# Patient Record
Sex: FEMALE | Race: OTHER | NOT HISPANIC OR LATINO | Employment: OTHER | URBAN - METROPOLITAN AREA
[De-identification: names, ages, dates, MRNs, and addresses within clinical notes are randomized per-mention and may not be internally consistent; named-entity substitution may affect disease eponyms.]

---

## 2022-01-28 ENCOUNTER — TRANSCRIBE ORDERS (OUTPATIENT)
Dept: CARDIOLOGY | Facility: CLINIC | Age: 72
End: 2022-01-28

## 2022-01-28 ENCOUNTER — OFFICE VISIT (OUTPATIENT)
Dept: CARDIOLOGY | Facility: CLINIC | Age: 72
End: 2022-01-28

## 2022-01-28 VITALS
BODY MASS INDEX: 26.59 KG/M2 | HEART RATE: 91 BPM | SYSTOLIC BLOOD PRESSURE: 132 MMHG | WEIGHT: 144.5 LBS | DIASTOLIC BLOOD PRESSURE: 72 MMHG | HEIGHT: 62 IN

## 2022-01-28 DIAGNOSIS — Z13.6 SCREENING FOR ISCHEMIC HEART DISEASE: ICD-10-CM

## 2022-01-28 DIAGNOSIS — I05.0 MITRAL VALVE STENOSIS, UNSPECIFIED ETIOLOGY: ICD-10-CM

## 2022-01-28 DIAGNOSIS — Z01.818 OTHER SPECIFIED PRE-OPERATIVE EXAMINATION: Primary | ICD-10-CM

## 2022-01-28 DIAGNOSIS — I63.9 CEREBROVASCULAR ACCIDENT (CVA), UNSPECIFIED MECHANISM: Primary | ICD-10-CM

## 2022-01-28 DIAGNOSIS — R07.2 PRECORDIAL PAIN: ICD-10-CM

## 2022-01-28 DIAGNOSIS — Z01.810 PRE-OPERATIVE CARDIOVASCULAR EXAMINATION: ICD-10-CM

## 2022-01-28 PROCEDURE — 93000 ELECTROCARDIOGRAM COMPLETE: CPT | Performed by: INTERNAL MEDICINE

## 2022-01-28 PROCEDURE — 99205 OFFICE O/P NEW HI 60 MIN: CPT | Performed by: INTERNAL MEDICINE

## 2022-01-28 RX ORDER — NITROGLYCERIN 0.4 MG/1
TABLET SUBLINGUAL
Qty: 20 TABLET | Refills: 1 | Status: SHIPPED | OUTPATIENT
Start: 2022-01-28

## 2022-01-28 RX ORDER — GLIMEPIRIDE 2 MG/1
2 TABLET ORAL
COMMUNITY
End: 2022-02-01

## 2022-01-28 RX ORDER — TEMAZEPAM 15 MG/1
15 CAPSULE ORAL NIGHTLY PRN
Status: ON HOLD | COMMUNITY
End: 2022-02-07

## 2022-01-28 RX ORDER — HYDROXYCHLOROQUINE SULFATE 200 MG/1
TABLET, FILM COATED ORAL DAILY
COMMUNITY
End: 2022-02-01

## 2022-01-28 RX ORDER — IBUPROFEN 600 MG/1
600 TABLET ORAL EVERY 6 HOURS PRN
COMMUNITY

## 2022-01-28 RX ORDER — MONTELUKAST SODIUM 10 MG/1
10 TABLET ORAL NIGHTLY
COMMUNITY
End: 2022-02-01

## 2022-01-28 RX ORDER — PROGESTERONE 200 MG/1
200 CAPSULE ORAL DAILY
COMMUNITY
End: 2022-02-01

## 2022-01-28 RX ORDER — TRIAMTERENE AND HYDROCHLOROTHIAZIDE 75; 50 MG/1; MG/1
1 TABLET ORAL DAILY
COMMUNITY
End: 2022-07-05 | Stop reason: SDUPTHER

## 2022-01-29 ENCOUNTER — LAB (OUTPATIENT)
Dept: LAB | Facility: HOSPITAL | Age: 72
End: 2022-01-29

## 2022-01-29 ENCOUNTER — APPOINTMENT (OUTPATIENT)
Dept: LAB | Facility: HOSPITAL | Age: 72
End: 2022-01-29

## 2022-01-29 DIAGNOSIS — Z13.6 SCREENING FOR ISCHEMIC HEART DISEASE: ICD-10-CM

## 2022-01-29 DIAGNOSIS — Z01.818 OTHER SPECIFIED PRE-OPERATIVE EXAMINATION: ICD-10-CM

## 2022-01-29 DIAGNOSIS — Z01.810 PRE-OPERATIVE CARDIOVASCULAR EXAMINATION: ICD-10-CM

## 2022-01-29 LAB
ANION GAP SERPL CALCULATED.3IONS-SCNC: 11.9 MMOL/L (ref 5–15)
BASOPHILS # BLD AUTO: 0.03 10*3/MM3 (ref 0–0.2)
BASOPHILS NFR BLD AUTO: 0.3 % (ref 0–1.5)
BUN SERPL-MCNC: 30 MG/DL (ref 8–23)
BUN/CREAT SERPL: 24.6 (ref 7–25)
CALCIUM SPEC-SCNC: 9.1 MG/DL (ref 8.6–10.5)
CHLORIDE SERPL-SCNC: 95 MMOL/L (ref 98–107)
CO2 SERPL-SCNC: 31.1 MMOL/L (ref 22–29)
CREAT SERPL-MCNC: 1.22 MG/DL (ref 0.57–1)
DEPRECATED RDW RBC AUTO: 42.3 FL (ref 37–54)
EOSINOPHIL # BLD AUTO: 0.32 10*3/MM3 (ref 0–0.4)
EOSINOPHIL NFR BLD AUTO: 3 % (ref 0.3–6.2)
ERYTHROCYTE [DISTWIDTH] IN BLOOD BY AUTOMATED COUNT: 13.3 % (ref 12.3–15.4)
GFR SERPL CREATININE-BSD FRML MDRD: 43 ML/MIN/1.73
GFR SERPL CREATININE-BSD FRML MDRD: 52 ML/MIN/1.73
GLUCOSE SERPL-MCNC: 110 MG/DL (ref 65–99)
HCT VFR BLD AUTO: 44.6 % (ref 34–46.6)
HGB BLD-MCNC: 14.4 G/DL (ref 12–15.9)
IMM GRANULOCYTES # BLD AUTO: 0.02 10*3/MM3 (ref 0–0.05)
IMM GRANULOCYTES NFR BLD AUTO: 0.2 % (ref 0–0.5)
LYMPHOCYTES # BLD AUTO: 4.51 10*3/MM3 (ref 0.7–3.1)
LYMPHOCYTES NFR BLD AUTO: 42.9 % (ref 19.6–45.3)
MCH RBC QN AUTO: 27.8 PG (ref 26.6–33)
MCHC RBC AUTO-ENTMCNC: 32.3 G/DL (ref 31.5–35.7)
MCV RBC AUTO: 86.1 FL (ref 79–97)
MONOCYTES # BLD AUTO: 0.6 10*3/MM3 (ref 0.1–0.9)
MONOCYTES NFR BLD AUTO: 5.7 % (ref 5–12)
NEUTROPHILS NFR BLD AUTO: 47.9 % (ref 42.7–76)
NEUTROPHILS NFR BLD AUTO: 5.03 10*3/MM3 (ref 1.7–7)
NRBC BLD AUTO-RTO: 0 /100 WBC (ref 0–0.2)
PLATELET # BLD AUTO: 404 10*3/MM3 (ref 140–450)
PMV BLD AUTO: 9.3 FL (ref 6–12)
POTASSIUM SERPL-SCNC: 3.9 MMOL/L (ref 3.5–5.2)
RBC # BLD AUTO: 5.18 10*6/MM3 (ref 3.77–5.28)
SARS-COV-2 ORF1AB RESP QL NAA+PROBE: NOT DETECTED
SODIUM SERPL-SCNC: 138 MMOL/L (ref 136–145)
WBC NRBC COR # BLD: 10.51 10*3/MM3 (ref 3.4–10.8)

## 2022-01-29 PROCEDURE — 85025 COMPLETE CBC W/AUTO DIFF WBC: CPT

## 2022-01-29 PROCEDURE — C9803 HOPD COVID-19 SPEC COLLECT: HCPCS

## 2022-01-29 PROCEDURE — U0005 INFEC AGEN DETEC AMPLI PROBE: HCPCS

## 2022-01-29 PROCEDURE — U0004 COV-19 TEST NON-CDC HGH THRU: HCPCS

## 2022-01-29 PROCEDURE — 80048 BASIC METABOLIC PNL TOTAL CA: CPT

## 2022-01-29 PROCEDURE — 36415 COLL VENOUS BLD VENIPUNCTURE: CPT

## 2022-01-31 ENCOUNTER — TELEPHONE (OUTPATIENT)
Dept: CARDIOLOGY | Facility: CLINIC | Age: 72
End: 2022-01-31

## 2022-01-31 ENCOUNTER — HOSPITAL ENCOUNTER (OUTPATIENT)
Dept: CARDIOLOGY | Facility: HOSPITAL | Age: 72
Discharge: HOME | End: 2022-01-31
Admitting: INTERNAL MEDICINE

## 2022-01-31 VITALS
RESPIRATION RATE: 14 BRPM | HEART RATE: 87 BPM | OXYGEN SATURATION: 95 % | DIASTOLIC BLOOD PRESSURE: 61 MMHG | SYSTOLIC BLOOD PRESSURE: 145 MMHG

## 2022-01-31 DIAGNOSIS — I05.0 MITRAL VALVE STENOSIS, UNSPECIFIED ETIOLOGY: ICD-10-CM

## 2022-01-31 DIAGNOSIS — I63.9 CEREBROVASCULAR ACCIDENT (CVA), UNSPECIFIED MECHANISM: ICD-10-CM

## 2022-01-31 LAB
BH CV ECHO MEAS - BSA(HAYCOCK): 1.7 M^2
BH CV ECHO MEAS - BSA: 1.7 M^2
BH CV ECHO MEAS - BZI_BMI: 26.3 KILOGRAMS/M^2
BH CV ECHO MEAS - BZI_METRIC_HEIGHT: 157.5 CM
BH CV ECHO MEAS - BZI_METRIC_WEIGHT: 65.3 KG
BH CV ECHO MEAS - MED PEAK E' VEL: 8 CM/SEC
BH CV ECHO MEAS - MV A MAX VEL: 134.5 CM/SEC
BH CV ECHO MEAS - MV DEC SLOPE: 451.1 CM/SEC^2
BH CV ECHO MEAS - MV E MAX VEL: 137 CM/SEC
BH CV ECHO MEAS - MV E/A: 1
BH CV ECHO MEAS - MV MAX PG: 8.2 MMHG
BH CV ECHO MEAS - MV MEAN PG: 5 MMHG
BH CV ECHO MEAS - MV P1/2T MAX VEL: 144.1 CM/SEC
BH CV ECHO MEAS - MV P1/2T: 93.6 MSEC
BH CV ECHO MEAS - MV V2 MAX: 143.1 CM/SEC
BH CV ECHO MEAS - MV V2 MEAN: 109.2 CM/SEC
BH CV ECHO MEAS - MV V2 VTI: 37.2 CM
BH CV ECHO MEAS - MVA P1/2T LCG: 1.5 CM^2
BH CV ECHO MEAS - MVA(P1/2T): 2.4 CM^2
MAXIMAL PREDICTED HEART RATE: 147 BPM
STRESS TARGET HR: 125 BPM

## 2022-01-31 PROCEDURE — 63710000001 LIDOCAINE VISCOUS HCL 2 % SOLUTION: Performed by: INTERNAL MEDICINE

## 2022-01-31 PROCEDURE — A9270 NON-COVERED ITEM OR SERVICE: HCPCS | Performed by: INTERNAL MEDICINE

## 2022-01-31 PROCEDURE — 25010000002 MIDAZOLAM PER 1 MG: Performed by: INTERNAL MEDICINE

## 2022-01-31 PROCEDURE — 76376 3D RENDER W/INTRP POSTPROCES: CPT

## 2022-01-31 PROCEDURE — 99153 MOD SED SAME PHYS/QHP EA: CPT

## 2022-01-31 PROCEDURE — 25010000002 FENTANYL CITRATE (PF) 50 MCG/ML SOLUTION: Performed by: INTERNAL MEDICINE

## 2022-01-31 PROCEDURE — 99152 MOD SED SAME PHYS/QHP 5/>YRS: CPT

## 2022-01-31 PROCEDURE — 93319 3D ECHO IMG CGEN CAR ANOMAL: CPT | Performed by: INTERNAL MEDICINE

## 2022-01-31 PROCEDURE — 93319 3D ECHO IMG CGEN CAR ANOMAL: CPT

## 2022-01-31 PROCEDURE — 93321 DOPPLER ECHO F-UP/LMTD STD: CPT | Performed by: INTERNAL MEDICINE

## 2022-01-31 PROCEDURE — 93321 DOPPLER ECHO F-UP/LMTD STD: CPT

## 2022-01-31 PROCEDURE — 93325 DOPPLER ECHO COLOR FLOW MAPG: CPT

## 2022-01-31 PROCEDURE — 93312 ECHO TRANSESOPHAGEAL: CPT | Performed by: INTERNAL MEDICINE

## 2022-01-31 PROCEDURE — 93312 ECHO TRANSESOPHAGEAL: CPT

## 2022-01-31 RX ORDER — SODIUM CHLORIDE 9 MG/ML
INJECTION, SOLUTION INTRAVENOUS
Status: COMPLETED | OUTPATIENT
Start: 2022-01-31 | End: 2022-01-31

## 2022-01-31 RX ORDER — LIDOCAINE HYDROCHLORIDE 20 MG/ML
SOLUTION OROPHARYNGEAL
Status: COMPLETED | OUTPATIENT
Start: 2022-01-31 | End: 2022-01-31

## 2022-01-31 RX ORDER — FENTANYL CITRATE 50 UG/ML
INJECTION, SOLUTION INTRAMUSCULAR; INTRAVENOUS
Status: COMPLETED | OUTPATIENT
Start: 2022-01-31 | End: 2022-01-31

## 2022-01-31 RX ORDER — MIDAZOLAM HYDROCHLORIDE 1 MG/ML
INJECTION INTRAMUSCULAR; INTRAVENOUS
Status: COMPLETED | OUTPATIENT
Start: 2022-01-31 | End: 2022-01-31

## 2022-01-31 RX ADMIN — MIDAZOLAM HYDROCHLORIDE 2 MG: 1 INJECTION, SOLUTION INTRAMUSCULAR; INTRAVENOUS at 11:25

## 2022-01-31 RX ADMIN — MIDAZOLAM HYDROCHLORIDE 2 MG: 1 INJECTION, SOLUTION INTRAMUSCULAR; INTRAVENOUS at 11:26

## 2022-01-31 RX ADMIN — FENTANYL CITRATE 25 MCG: 50 INJECTION, SOLUTION INTRAMUSCULAR; INTRAVENOUS at 11:32

## 2022-01-31 RX ADMIN — LIDOCAINE HYDROCHLORIDE 10 ML: 20 SOLUTION ORAL; TOPICAL at 11:21

## 2022-01-31 RX ADMIN — FENTANYL CITRATE 25 MCG: 50 INJECTION, SOLUTION INTRAMUSCULAR; INTRAVENOUS at 11:28

## 2022-01-31 RX ADMIN — FENTANYL CITRATE 25 MCG: 50 INJECTION, SOLUTION INTRAMUSCULAR; INTRAVENOUS at 11:26

## 2022-01-31 RX ADMIN — SODIUM CHLORIDE 50 ML/HR: 9 INJECTION, SOLUTION INTRAVENOUS at 11:21

## 2022-01-31 RX ADMIN — MIDAZOLAM HYDROCHLORIDE 2 MG: 1 INJECTION, SOLUTION INTRAMUSCULAR; INTRAVENOUS at 11:30

## 2022-01-31 NOTE — TELEPHONE ENCOUNTER
1/31/21    Received a Email for clearance.   Pt was seen as a new pt on Friday 1/28/22 with Dr. Hancock  She is scheduled for a LALITA and further testing before being cleared.  Rodolfo BUTLER

## 2022-01-31 NOTE — PROGRESS NOTES
"Date of Office Visit: 2022  Encounter Provider: Gumaro Hancock MD  Place of Service: [facility]  Patient Name: Lula Burnett  :1950    Chief complaint: Recurrent strokes, chest pain, shortness of breath, palpitations, mitral stenosis.    History of Present Illness:    I had the pleasure of seeing the patient in cardiology office on 2022.  She is a very pleasant 71 year-old female with multiple medical issues who presents for evaluation.  She does have multiple noncardiac issues, including longstanding diabetes, recurrent strokes, and hypertension.    The patient has had approximately 3 strokes in the last 15 years per her account.  She suffered a stroke in 2021, at which time she developed significant left-sided hemiparesis.  She was evaluated at [facility] in [place], [place] at that time.  An MRI of her brain on 10/16/2021 showed a large acute CVA bridging the right occipital lobe in the right parietal lobe, and to lesser extent the right temporal lobe.  She also had an old left thalamic lacunar infarct.  A CT angiogram of the head and neck was unremarkable.  There was no significant carotid artery stenosis.  She had an echocardiogram which showed an ejection fraction of 65 to 70%, moderate LVH, grade 1 diastolic dysfunction, negative bubble study, moderate to severe mitral annular calcification, and moderate mitral stenosis with a mean gradient of 4 mmHg.  Cardiology did evaluate her and recommended outpatient follow-up and potential loop recorder placement.  However, this did not occur for unclear reasons.  She was discharged on aspirin and Plavix, although the aspirin later was discontinued because of excessive bruising.    She has a number of complaints today.  She states that she has had pain in the center of her chest which is a \"hard pain\" and occurs mostly with exertion, although has been occurring more frequently " "with rest.  This has been occurring for the past 3 to 4 months.  She often has to sit down to rest to get the pain to resolve, and it can last for up to an hour, although at least several minutes.  She also has had increasing dyspnea on exertion, which tends to correlate with the chest discomfort.  She has had palpitations during which time she feels like her heart is racing.  She states that it feels like it \"speeds up and then slows down\".    Past Medical History:   Diagnosis Date   • CVA (cerebral vascular accident) (HCC)     x3   • Diabetes (HCC)    • Diabetic neuropathy (HCC)    • GERD (gastroesophageal reflux disease)    • Hypertension    • Hypothyroidism    • Mitral stenosis        Past Surgical History:   Procedure Laterality Date   • CHOLECYSTECTOMY     • HYSTERECTOMY         Current Outpatient Medications on File Prior to Visit   Medication Sig Dispense Refill   • estrogens, conjugated, (PREMARIN) 1.25 MG tablet Take 1.25 mg by mouth Daily.     • glimepiride (AMARYL) 2 MG tablet Take 2 mg by mouth Every Morning Before Breakfast.     • hydroxychloroquine (PLAQUENIL) 200 MG tablet Take  by mouth Daily.     • ibuprofen (ADVIL,MOTRIN) 600 MG tablet Take 600 mg by mouth Every 6 (Six) Hours As Needed for Mild Pain .     • montelukast (Singulair) 10 MG tablet Take 10 mg by mouth Every Night.     • Progesterone (PROMETRIUM) 200 MG capsule Take 200 mg by mouth Daily.     • PROGESTERONE MICRONIZED TD Place  on the skin as directed by provider Every Night.     • temazepam (RESTORIL) 15 MG capsule Take 15 mg by mouth At Night As Needed for Sleep.     • triamterene-hydrochlorothiazide (MAXZIDE) 75-50 MG per tablet Take 1 tablet by mouth Daily.       No current facility-administered medications on file prior to visit.     Allergies as of 01/28/2022 - Reviewed 01/28/2022   Allergen Reaction Noted   • Codeine GI Intolerance 01/28/2022     Social History     Socioeconomic History   • Marital status:    Tobacco Use " "  • Smoking status: Former Smoker   • Smokeless tobacco: Never Used   • Tobacco comment: Quit around 1990   Substance and Sexual Activity   • Alcohol use: Never   • Drug use: Never     Family History   Problem Relation Age of Onset   • Heart failure Mother    • Lung cancer Father    • Stroke Sister        Review of Systems   Constitutional: Positive for malaise/fatigue.   Cardiovascular: Positive for chest pain, dyspnea on exertion and palpitations.   Respiratory: Positive for shortness of breath.    Gastrointestinal: Positive for nausea and vomiting.   Psychiatric/Behavioral: Positive for depression.   All other systems reviewed and are negative.     Objective:     Vitals:    01/28/22 1310   BP: 132/72   Pulse: 91   Weight: 65.5 kg (144 lb 8 oz)   Height: 157.5 cm (62\")     Body mass index is 26.43 kg/m².    Constitutional:       Appearance: Healthy appearance. Well-developed.   Eyes:      Conjunctiva/sclera: Conjunctivae normal.   HENT:      Head: Normocephalic and atraumatic.   Pulmonary:      Effort: Pulmonary effort is normal.      Breath sounds: Normal breath sounds.   Cardiovascular:      Normal rate. Regular rhythm.      Murmurs: There is no murmur.      No gallop.   Edema:     Peripheral edema absent.   Abdominal:      Palpations: Abdomen is soft.      Tenderness: There is no abdominal tenderness.   Musculoskeletal:      Cervical back: Neck supple. Skin:     General: Skin is warm.   Neurological:      Mental Status: Alert and oriented to person, place, and time.   Psychiatric:         Behavior: Behavior normal.       Lab Review:     ECG 12 Lead    Date/Time: 1/28/2022 1:14 PM  Performed by: Gumaro Hancock MD  Authorized by: Gumaro Hancock MD   Comparison: compared with previous ECG from 10/15/2021  Similar to previous ECG  Rhythm: sinus rhythm  Rate: normal  BPM: 91  Conduction: left anterior fascicular block  Other findings: poor R wave progression    Clinical impression: abnormal " EKG            Cardiac Procedures:  1.  Echocardiogram on 10/16/2021 at [facility] in [place], [place]: Ejection fraction was 65 to 70%.  There was moderate LVH.  There was grade 1 diastolic dysfunction.  The bubble study was negative.  There was moderate to severe mitral annular calcification.  There was moderate mitral stenosis with a mean gradient of 4 mmHg.    Assessment:       Diagnosis Plan   1. Cerebrovascular accident (CVA), unspecified mechanism (HCC)  Adult Transesophageal Echo (LALITA) W/ Cont if Necessary Per Protocol   2. Precordial pain     3. Mitral valve stenosis, unspecified etiology  Adult Transesophageal Echo (LALITA) W/ Cont if Necessary Per Protocol     Plan:       I had a long and extensive discussion with the patient and her son.  There are multiple issues which need to be addressed.    First, with regards to her recurrent strokes, the most recent event was in October 2021 in [place], as detailed above.  This was a fairly extensive right-sided stroke involving the right occipital lobe, right parietal lobe, and to lesser extent the right temporal lobe.  She also had an old left thalamic lacunar infarction on the MRI of the brain, and she tells me that she has had 3 strokes in the last 15 years.  The bubble study was negative, although there was some calcific mitral stenosis which was noted to be moderate.  She has never had a transesophageal echocardiogram.  I have recommended proceeding with a LALITA as soon as possible.  She is currently only on Plavix for antiplatelet therapy.  She is not on systemic anticoagulation.  She had to stop aspirin because of excessive bruising.  However, I told her to resume the aspirin at 81 mg/day for now until the LALITA can be performed.    She also is likely experiencing accelerated anginal type symptoms.  She needs a left heart catheterization as soon as possible as well.  I am going to perform the LALITA first in case this will affect systemic anticoagulation.   She also very likely needs placement of a loop recorder with the recurrent strokes, which could be performed at the same time as the heart catheterization.    I did recommend that she stop her Premarin, although she evidently has a very difficult time with hot flashes.  I did prescribe sublingual nitroglycerin for her.  She is already taking carvedilol and Lipitor.  I told her to go to the ER for any severe pain.  The patient and her son were in agreement with this plan.  She does the left retina surgery, although I told her this is going to have to be postponed indefinitely until her other issues are worked up.    I spent 70 minutes in the care of the patient, including extensively reviewing her outside records.  A significant proportion of time was spent in the room with the patient and her son regarding her recurrent strokes, need for a transesophageal echocardiogram, angina treatment and testing, and long-term monitoring.

## 2022-02-01 ENCOUNTER — OFFICE VISIT (OUTPATIENT)
Dept: FAMILY MEDICINE CLINIC | Facility: CLINIC | Age: 72
End: 2022-02-01

## 2022-02-01 VITALS
BODY MASS INDEX: 26.76 KG/M2 | TEMPERATURE: 96.8 F | HEIGHT: 62 IN | OXYGEN SATURATION: 98 % | WEIGHT: 145.4 LBS | SYSTOLIC BLOOD PRESSURE: 128 MMHG | DIASTOLIC BLOOD PRESSURE: 78 MMHG | RESPIRATION RATE: 18 BRPM | HEART RATE: 90 BPM

## 2022-02-01 DIAGNOSIS — L40.9 PSORIASIS: ICD-10-CM

## 2022-02-01 DIAGNOSIS — I34.81 MITRAL VALVE ANNULAR CALCIFICATION: ICD-10-CM

## 2022-02-01 DIAGNOSIS — R07.2 PRECORDIAL PAIN: Primary | ICD-10-CM

## 2022-02-01 DIAGNOSIS — I63.9 CEREBROVASCULAR ACCIDENT (CVA), UNSPECIFIED MECHANISM: ICD-10-CM

## 2022-02-01 DIAGNOSIS — R00.2 PALPITATIONS: ICD-10-CM

## 2022-02-01 DIAGNOSIS — E78.2 MIXED HYPERLIPIDEMIA: ICD-10-CM

## 2022-02-01 DIAGNOSIS — Z76.89 ENCOUNTER TO ESTABLISH CARE: Primary | ICD-10-CM

## 2022-02-01 DIAGNOSIS — F32.5 MAJOR DEPRESSIVE DISORDER IN FULL REMISSION, UNSPECIFIED WHETHER RECURRENT: ICD-10-CM

## 2022-02-01 DIAGNOSIS — F41.9 ANXIETY: ICD-10-CM

## 2022-02-01 DIAGNOSIS — N95.1 MENOPAUSAL SYNDROME (HOT FLASHES): ICD-10-CM

## 2022-02-01 DIAGNOSIS — E11.9 TYPE 2 DIABETES MELLITUS WITHOUT COMPLICATION, WITHOUT LONG-TERM CURRENT USE OF INSULIN: ICD-10-CM

## 2022-02-01 DIAGNOSIS — J45.40 MODERATE PERSISTENT ASTHMA WITHOUT COMPLICATION: ICD-10-CM

## 2022-02-01 DIAGNOSIS — I20.0 UNSTABLE ANGINA: ICD-10-CM

## 2022-02-01 DIAGNOSIS — I10 PRIMARY HYPERTENSION: ICD-10-CM

## 2022-02-01 PROCEDURE — 99204 OFFICE O/P NEW MOD 45 MIN: CPT | Performed by: FAMILY MEDICINE

## 2022-02-01 RX ORDER — ALPRAZOLAM 0.25 MG/1
0.12 TABLET ORAL 2 TIMES DAILY
Qty: 30 TABLET | Refills: 0 | Status: SHIPPED | OUTPATIENT
Start: 2022-02-01 | End: 2022-02-01 | Stop reason: SDUPTHER

## 2022-02-01 RX ORDER — ADALIMUMAB 40MG/0.8ML
KIT SUBCUTANEOUS
Qty: 4 EACH | Refills: 12
Start: 2022-02-01 | End: 2023-01-24 | Stop reason: SDUPTHER

## 2022-02-01 RX ORDER — ALPRAZOLAM 0.25 MG/1
0.12 TABLET ORAL 2 TIMES DAILY
Qty: 90 TABLET | Refills: 1 | Status: SHIPPED | OUTPATIENT
Start: 2022-02-01 | End: 2022-08-01 | Stop reason: SDUPTHER

## 2022-02-01 NOTE — PROGRESS NOTES
"Chief Complaint  Establish Care  History of Present Illness   Lula Burnett presents today to establish care. Patient states that she is new to the area, recently moved from [place], about a montha go to live with son and daughter in law, Sebastián and Daly Boogie.  She is requesting some medication refills.   She has already established with cardiologist, Dr. Hancock, office visit note from 1/28/2022 has been reviewed.  With history of recurrent strokes he is recommending a LALITA.     Coronary artery disease with angina, cardiologidst indicated need for left heart cath but will obtain LALITA first in case that would affect need for systemic anticoagulation.  Also intends to place a loop recorder  CVAs-the most recent event was in October 2021 in [place], as detailed above.  This was a fairly extensive right-sided stroke involving the right occipital lobe, right parietal lobe, and to lesser extent the right temporal lobe.  She also had an old left thalamic lacunar infarction on the MRI of the brain, 3 strokes in the last 15 years.  He continued her Plavix and started her back on 81 mg aspirin daily.   reports previous carotid doppler was neg.    Postmenopausal syndrome, cardiologist advised she stop Premarin the patient reports significant hot flashes and is requesting a refill today.    Anxiety, patient is on Paxil 20 mg 1/2 bid and reports taking Xanax 1/2 two times daily.  Told have \"no seritonin\" without medications became suicidal. No suicidal ideation since started medication in 1995.\    Diabetes well controlled last a1c 7.0 about 1 month ago    Current Outpatient Medications on File Prior to Visit   Medication Sig   • atorvastatin (LIPITOR) 40 MG tablet 40 mg.   • carvedilol (COREG) 6.25 MG tablet 6.25 mg 2 (Two) Times a Day With Meals.   • cetirizine (zyrTEC) 10 MG tablet    • clopidogrel (PLAVIX) 75 MG tablet    • cyclobenzaprine (FLEXERIL) 10 MG tablet    • digoxin (LANOXIN) 250 MCG tablet 250 mcg.   • " "ibuprofen (ADVIL,MOTRIN) 600 MG tablet Take 600 mg by mouth Every 6 (Six) Hours As Needed for Mild Pain .   • meclizine (ANTIVERT) 25 MG tablet 3 (Three) Times a Day As Needed.   • nitroglycerin (NITROSTAT) 0.4 MG SL tablet 1 under the tongue as needed for angina, may repeat q5mins for up three doses   • ondansetron ODT (ZOFRAN-ODT) 8 MG disintegrating tablet    • pantoprazole (PROTONIX) 40 MG EC tablet    • PARoxetine (PAXIL) 20 MG tablet Take  by mouth Daily.   • potassium chloride (K-DUR,KLOR-CON) 20 MEQ CR tablet 20 mEq Daily.   • ProAir  (90 Base) MCG/ACT inhaler    • Synjardy XR  MG tablet sustained-release 24 hour    • temazepam (RESTORIL) 15 MG capsule Take 15 mg by mouth At Night As Needed for Sleep.   • topiramate (TOPAMAX) 25 MG tablet 2 (Two) Times a Day.   • triamterene-hydrochlorothiazide (MAXZIDE) 75-50 MG per tablet Take 1 tablet by mouth Daily.   • [DISCONTINUED] ALPRAZolam (XANAX) 0.25 MG tablet 3 (Three) Times a Day.   • [DISCONTINUED] estrogens, conjugated, (PREMARIN) 1.25 MG tablet Take 1.25 mg by mouth Daily.   • [DISCONTINUED] Bydureon BCise 2 MG/0.85ML auto-injector injection    • [DISCONTINUED] fenofibrate 160 MG tablet    • [DISCONTINUED] glimepiride (AMARYL) 2 MG tablet Take 2 mg by mouth Every Morning Before Breakfast.   • [DISCONTINUED] hydroxychloroquine (PLAQUENIL) 200 MG tablet Take  by mouth Daily.   • [DISCONTINUED] lidocaine (LIDODERM) 5 %    • [DISCONTINUED] montelukast (Singulair) 10 MG tablet Take 10 mg by mouth Every Night.   • [DISCONTINUED] Progesterone (PROMETRIUM) 200 MG capsule Take 200 mg by mouth Daily.   • [DISCONTINUED] PROGESTERONE MICRONIZED TD Place  on the skin as directed by provider Every Night.     Current Facility-Administered Medications on File Prior to Visit   Medication   • [COMPLETED] sodium chloride 0.9 % infusion       Objective   Vital Signs:   /78   Pulse 90   Temp 96.8 °F (36 °C) (Temporal)   Resp 18   Ht 157.5 cm (62\")   Wt " 66 kg (145 lb 6.4 oz)   SpO2 98%   BMI 26.59 kg/m²       Physical Exam  Constitutional:       General: She is not in acute distress.     Appearance: She is well-developed.   HENT:      Head: Normocephalic and atraumatic.      Right Ear: External ear normal.      Left Ear: External ear normal.   Eyes:      Conjunctiva/sclera: Conjunctivae normal.      Pupils: Pupils are equal, round, and reactive to light.   Neck:      Thyroid: No thyromegaly.      Trachea: No tracheal deviation.   Cardiovascular:      Rate and Rhythm: Normal rate and regular rhythm.      Heart sounds: No murmur heard.  No friction rub. No gallop.    Pulmonary:      Effort: Pulmonary effort is normal.      Breath sounds: Normal breath sounds. No wheezing or rales.   Abdominal:      General: Bowel sounds are normal.      Palpations: Abdomen is soft.      Tenderness: There is no abdominal tenderness.   Musculoskeletal:      Cervical back: Normal range of motion and neck supple.   Lymphadenopathy:      Cervical: No cervical adenopathy.   Skin:     General: Skin is warm and dry.      Findings: No rash.   Neurological:      Mental Status: She is alert and oriented to person, place, and time.   Psychiatric:         Behavior: Behavior normal.            No visits with results within 1 Day(s) from this visit.   Latest known visit with results is:   Hospital Outpatient Visit on 01/31/2022   Component Date Value Ref Range Status   • BSA 01/31/2022 1.7  m^2 Final   • MV E max elyse 01/31/2022 137.0  cm/sec Final   • MV A max elyse 01/31/2022 134.5  cm/sec Final   • MV E/A 01/31/2022 1.0   Final   • MV V2 max 01/31/2022 143.1  cm/sec Final   • MV max PG 01/31/2022 8.2  mmHg Final   • MV V2 mean 01/31/2022 109.2  cm/sec Final   • MV mean PG 01/31/2022 5.0  mmHg Final   • MV V2 VTI 01/31/2022 37.2  cm Final   • MV P1/2t max elyse 01/31/2022 144.1  cm/sec Final   • MV P1/2t 01/31/2022 93.6  msec Final   • MVA(P1/2t) 01/31/2022 2.4  cm^2 Final   • MV dec slope  01/31/2022 451.1  cm/sec^2 Final   • MVA P1/2T LCG 01/31/2022 1.5  cm^2 Final   • Med Peak E' El 01/31/2022 8.0  cm/sec Final   • BH CV ECHO SATNAM - BZI_BMI 01/31/2022 26.3  kilograms/m^2 Final   • BH CV ECHO SATNAM - BSA(HAYCOCK) 01/31/2022 1.7  m^2 Final   • BH CV ECHO SATNAM - BZI_METRIC_WEIGHT 01/31/2022 65.3  kg Final   • BH CV ECHO SATNAM - BZI_METRIC_HEIGHT 01/31/2022 157.5  cm Final   • Target HR (85%) 01/31/2022 125  bpm Final   • Max. Pred. HR (100%) 01/31/2022 147  bpm Final       Lab Results   Component Value Date    BUN 30 (H) 01/29/2022    CREATININE 1.22 (H) 01/29/2022    EGFRIFNONA 43 (L) 01/29/2022    EGFRIFAFRI 52 (L) 01/29/2022     01/29/2022    K 3.9 01/29/2022    CL 95 (L) 01/29/2022    CALCIUM 9.1 01/29/2022    WBC 10.51 01/29/2022    RBC 5.18 01/29/2022    HCT 44.6 01/29/2022    MCV 86.1 01/29/2022    MCH 27.8 01/29/2022        No results found for: HGBA1C        Date/Time: 1/28/2022 1:14 PM  Performed by: Gumaro Hancock MD  Authorized by: Gumaro Hancock MD   Comparison: compared with previous ECG from 10/15/2021  Similar to previous ECG  Rhythm: sinus rhythm  Rate: normal  BPM: 91  Conduction: left anterior fascicular block  Other findings: poor R wave progression    Clinical impression: abnormal EKG        Echocardiogram on 10/16/2021 at [facility] in [place], [place]: Ejection fraction was 65 to 70%.  There was moderate LVH.  There was grade 1 diastolic dysfunction.  The bubble study was negative.  There was moderate to severe mitral annular calcification.  There was moderate mitral stenosis with a mean gradient of 4 mmHg     Assessment and Plan    Diagnoses and all orders for this visit:    1. Encounter to establish care (Primary)    2. Mitral valve annular calcification    3. Menopausal syndrome (hot flashes)  -     estrogens, conjugated, (PREMARIN) 0.9 MG tablet; Take 1 tablet by mouth Daily.  Dispense: 30 tablet; Refill: 1    4. Anxiety  -     Discontinue:  ALPRAZolam (XANAX) 0.25 MG tablet; Take 0.5 tablets by mouth 2 (Two) Times a Day.  Dispense: 30 tablet; Refill: 0  -     ALPRAZolam (XANAX) 0.25 MG tablet; Take 0.5 tablets by mouth 2 (Two) Times a Day.  Dispense: 90 tablet; Refill: 1    5. Psoriasis  Comments:  Dr Sung Viera  Orders:  -     Adalimumab (Humira Pen) 40 MG/0.8ML Pen-injector Kit; Inject  under the skin into the appropriate area as directed Every 14 (Fourteen) Days. Dr Sung viera for psoriasis  Dispense: 4 each; Refill: 12    6. Mixed hyperlipidemia  -     Lipid Panel    7. Primary hypertension  -     Comprehensive Metabolic Panel  -     CBC & Differential  -     TSH  -     T4, Free  -     MicroAlbumin, Urine, Random - Urine, Clean Catch    8. Major depressive disorder in full remission, unspecified whether recurrent (HCC)    9. Type 2 diabetes mellitus without complication, without long-term current use of insulin (HCC)  -     Hemoglobin A1c  -     TSH  -     T4, Free  -     MicroAlbumin, Urine, Random - Urine, Clean Catch    10. Moderate persistent asthma without complication    Other orders  -     fluticasone-salmeterol (Advair Diskus) 250-50 MCG/DOSE DISKUS; Inhale 1 puff 2 (Two) Times a Day.  Dispense: 180 each; Refill: 3        History of severe depression and moderate anxiety very well controlled currently on Paxil and low-dose daily Xanax.  Renewing medications.    Stressed that estrogen hormones can increase coagulability and increased risk of heart attacks and strokes.  After discussion she is willing to try to reduce Premarin from 1.25 mg to 0.9 mg daily.  Advised I would like to continue to try to reduce and wean off.  Could add in clonidine if needed for hot flashes.    Chronic medical conditions including diabetes, hyperlipidemia, hypertension, and asthma, currently stable, ordering labs as above to complete prior to next appointment  Medications Discontinued During This Encounter   Medication Reason   •  fenofibrate 160 MG tablet *Therapy completed   • lidocaine (LIDODERM) 5 % *Therapy completed   • montelukast (Singulair) 10 MG tablet *Therapy completed   • Progesterone (PROMETRIUM) 200 MG capsule *Therapy completed   • PROGESTERONE MICRONIZED TD *Therapy completed   • hydroxychloroquine (PLAQUENIL) 200 MG tablet *Therapy completed   • Bydureon BCise 2 MG/0.85ML auto-injector injection *Therapy completed   • glimepiride (AMARYL) 2 MG tablet *Therapy completed   • ALPRAZolam (XANAX) 0.25 MG tablet Reorder   • estrogens, conjugated, (PREMARIN) 1.25 MG tablet Reorder   • ALPRAZolam (XANAX) 0.25 MG tablet Reorder         Follow Up     Return in about 3 months (around 5/1/2022) for diabetes, htn, menopausal syndrome.    Patient was given instructions and counseling regarding her condition or for health maintenance advice. Please see specific information pulled into the AVS if appropriate.

## 2022-02-02 ENCOUNTER — TRANSCRIBE ORDERS (OUTPATIENT)
Dept: CARDIOLOGY | Facility: CLINIC | Age: 72
End: 2022-02-02

## 2022-02-02 ENCOUNTER — TRANSCRIBE ORDERS (OUTPATIENT)
Dept: ADMINISTRATIVE | Facility: HOSPITAL | Age: 72
End: 2022-02-02

## 2022-02-02 DIAGNOSIS — Z01.818 OTHER SPECIFIED PRE-OPERATIVE EXAMINATION: Primary | ICD-10-CM

## 2022-02-02 DIAGNOSIS — Z13.6 SCREENING FOR CARDIOVASCULAR CONDITION: Primary | ICD-10-CM

## 2022-02-02 DIAGNOSIS — Z01.810 PREPROCEDURAL CARDIOVASCULAR EXAMINATION: ICD-10-CM

## 2022-02-02 DIAGNOSIS — Z01.818 OTHER SPECIFIED PRE-OPERATIVE EXAMINATION: ICD-10-CM

## 2022-02-04 ENCOUNTER — LAB (OUTPATIENT)
Dept: LAB | Facility: HOSPITAL | Age: 72
End: 2022-02-04

## 2022-02-04 DIAGNOSIS — Z01.818 OTHER SPECIFIED PRE-OPERATIVE EXAMINATION: ICD-10-CM

## 2022-02-04 DIAGNOSIS — Z01.810 PREPROCEDURAL CARDIOVASCULAR EXAMINATION: ICD-10-CM

## 2022-02-04 DIAGNOSIS — Z13.6 SCREENING FOR CARDIOVASCULAR CONDITION: ICD-10-CM

## 2022-02-04 LAB
ALBUMIN SERPL-MCNC: 4.3 G/DL (ref 3.5–5.2)
ALBUMIN UR-MCNC: 66.9 MG/DL
ALBUMIN/GLOB SERPL: 1.3 G/DL
ALP SERPL-CCNC: 100 U/L (ref 39–117)
ALT SERPL W P-5'-P-CCNC: 20 U/L (ref 1–33)
ANION GAP SERPL CALCULATED.3IONS-SCNC: 13 MMOL/L (ref 5–15)
AST SERPL-CCNC: 20 U/L (ref 1–32)
BASOPHILS # BLD AUTO: 0.1 10*3/MM3 (ref 0–0.2)
BASOPHILS NFR BLD AUTO: 0.6 % (ref 0–1.5)
BILIRUB SERPL-MCNC: 0.3 MG/DL (ref 0–1.2)
BUN SERPL-MCNC: 23 MG/DL (ref 8–23)
BUN/CREAT SERPL: 21.9 (ref 7–25)
CALCIUM SPEC-SCNC: 10.5 MG/DL (ref 8.6–10.5)
CHLORIDE SERPL-SCNC: 94 MMOL/L (ref 98–107)
CHOLEST SERPL-MCNC: 147 MG/DL (ref 0–200)
CO2 SERPL-SCNC: 30 MMOL/L (ref 22–29)
CREAT SERPL-MCNC: 1.05 MG/DL (ref 0.57–1)
DEPRECATED RDW RBC AUTO: 45.1 FL (ref 37–54)
EOSINOPHIL # BLD AUTO: 0.1 10*3/MM3 (ref 0–0.4)
EOSINOPHIL NFR BLD AUTO: 1.2 % (ref 0.3–6.2)
ERYTHROCYTE [DISTWIDTH] IN BLOOD BY AUTOMATED COUNT: 15.1 % (ref 12.3–15.4)
GFR SERPL CREATININE-BSD FRML MDRD: 51 ML/MIN/1.73
GFR SERPL CREATININE-BSD FRML MDRD: 62 ML/MIN/1.73
GLOBULIN UR ELPH-MCNC: 3.2 GM/DL
GLUCOSE SERPL-MCNC: 112 MG/DL (ref 65–99)
HBA1C MFR BLD: 7 % (ref 3.5–5.6)
HCT VFR BLD AUTO: 47.4 % (ref 34–46.6)
HDLC SERPL-MCNC: 62 MG/DL (ref 40–60)
HGB BLD-MCNC: 15.7 G/DL (ref 12–15.9)
LDLC SERPL CALC-MCNC: 28 MG/DL (ref 0–100)
LDLC/HDLC SERPL: 0.05 {RATIO}
LYMPHOCYTES # BLD AUTO: 4.1 10*3/MM3 (ref 0.7–3.1)
LYMPHOCYTES NFR BLD AUTO: 34.8 % (ref 19.6–45.3)
MCH RBC QN AUTO: 28.5 PG (ref 26.6–33)
MCHC RBC AUTO-ENTMCNC: 33.2 G/DL (ref 31.5–35.7)
MCV RBC AUTO: 85.7 FL (ref 79–97)
MONOCYTES # BLD AUTO: 0.5 10*3/MM3 (ref 0.1–0.9)
MONOCYTES NFR BLD AUTO: 4.5 % (ref 5–12)
NEUTROPHILS NFR BLD AUTO: 58.9 % (ref 42.7–76)
NEUTROPHILS NFR BLD AUTO: 7 10*3/MM3 (ref 1.7–7)
NRBC BLD AUTO-RTO: 0 /100 WBC (ref 0–0.2)
PLATELET # BLD AUTO: 487 10*3/MM3 (ref 140–450)
PMV BLD AUTO: 7 FL (ref 6–12)
POTASSIUM SERPL-SCNC: 3.9 MMOL/L (ref 3.5–5.2)
PROT SERPL-MCNC: 7.5 G/DL (ref 6–8.5)
RBC # BLD AUTO: 5.52 10*6/MM3 (ref 3.77–5.28)
SARS-COV-2 ORF1AB RESP QL NAA+PROBE: NOT DETECTED
SODIUM SERPL-SCNC: 137 MMOL/L (ref 136–145)
T4 FREE SERPL-MCNC: 1.27 NG/DL (ref 0.93–1.7)
TRIGL SERPL-MCNC: 408 MG/DL (ref 0–150)
TSH SERPL DL<=0.05 MIU/L-ACNC: 1.87 UIU/ML (ref 0.27–4.2)
VLDLC SERPL-MCNC: 57 MG/DL (ref 5–40)
WBC NRBC COR # BLD: 11.9 10*3/MM3 (ref 3.4–10.8)

## 2022-02-04 PROCEDURE — 82043 UR ALBUMIN QUANTITATIVE: CPT | Performed by: FAMILY MEDICINE

## 2022-02-04 PROCEDURE — U0004 COV-19 TEST NON-CDC HGH THRU: HCPCS

## 2022-02-04 PROCEDURE — U0005 INFEC AGEN DETEC AMPLI PROBE: HCPCS

## 2022-02-04 PROCEDURE — 36415 COLL VENOUS BLD VENIPUNCTURE: CPT | Performed by: FAMILY MEDICINE

## 2022-02-04 PROCEDURE — 83036 HEMOGLOBIN GLYCOSYLATED A1C: CPT | Performed by: FAMILY MEDICINE

## 2022-02-04 PROCEDURE — 84439 ASSAY OF FREE THYROXINE: CPT | Performed by: FAMILY MEDICINE

## 2022-02-04 PROCEDURE — 80053 COMPREHEN METABOLIC PANEL: CPT | Performed by: FAMILY MEDICINE

## 2022-02-04 PROCEDURE — 85025 COMPLETE CBC W/AUTO DIFF WBC: CPT | Performed by: FAMILY MEDICINE

## 2022-02-04 PROCEDURE — C9803 HOPD COVID-19 SPEC COLLECT: HCPCS

## 2022-02-04 PROCEDURE — 80061 LIPID PANEL: CPT | Performed by: FAMILY MEDICINE

## 2022-02-04 PROCEDURE — 84443 ASSAY THYROID STIM HORMONE: CPT | Performed by: FAMILY MEDICINE

## 2022-02-06 DIAGNOSIS — E78.2 MIXED HYPERLIPIDEMIA: Primary | ICD-10-CM

## 2022-02-06 DIAGNOSIS — E11.9 TYPE 2 DIABETES MELLITUS WITHOUT COMPLICATION, WITHOUT LONG-TERM CURRENT USE OF INSULIN: ICD-10-CM

## 2022-02-06 DIAGNOSIS — D75.839 THROMBOCYTOSIS: ICD-10-CM

## 2022-02-07 ENCOUNTER — HOSPITAL ENCOUNTER (OUTPATIENT)
Facility: HOSPITAL | Age: 72
Setting detail: HOSPITAL OUTPATIENT SURGERY
Discharge: HOME | End: 2022-02-07
Attending: INTERNAL MEDICINE | Admitting: INTERNAL MEDICINE

## 2022-02-07 VITALS
BODY MASS INDEX: 26.59 KG/M2 | OXYGEN SATURATION: 94 % | HEIGHT: 62 IN | SYSTOLIC BLOOD PRESSURE: 142 MMHG | HEART RATE: 93 BPM | RESPIRATION RATE: 16 BRPM | TEMPERATURE: 97.1 F | DIASTOLIC BLOOD PRESSURE: 67 MMHG

## 2022-02-07 DIAGNOSIS — I20.0 UNSTABLE ANGINA: ICD-10-CM

## 2022-02-07 LAB
GLUCOSE BLDC GLUCOMTR-MCNC: 106 MG/DL (ref 70–130)
GLUCOSE BLDC GLUCOMTR-MCNC: 97 MG/DL (ref 70–130)

## 2022-02-07 PROCEDURE — 99152 MOD SED SAME PHYS/QHP 5/>YRS: CPT | Performed by: INTERNAL MEDICINE

## 2022-02-07 PROCEDURE — C1894 INTRO/SHEATH, NON-LASER: HCPCS | Performed by: INTERNAL MEDICINE

## 2022-02-07 PROCEDURE — 25010000002 MIDAZOLAM PER 1 MG: Performed by: INTERNAL MEDICINE

## 2022-02-07 PROCEDURE — 25010000002 FENTANYL CITRATE (PF) 50 MCG/ML SOLUTION: Performed by: INTERNAL MEDICINE

## 2022-02-07 PROCEDURE — 33285 INSJ SUBQ CAR RHYTHM MNTR: CPT | Performed by: INTERNAL MEDICINE

## 2022-02-07 PROCEDURE — 25010000002 HEPARIN (PORCINE) PER 1000 UNITS: Performed by: INTERNAL MEDICINE

## 2022-02-07 PROCEDURE — C1769 GUIDE WIRE: HCPCS | Performed by: INTERNAL MEDICINE

## 2022-02-07 PROCEDURE — 0 IOPAMIDOL PER 1 ML: Performed by: INTERNAL MEDICINE

## 2022-02-07 PROCEDURE — C1764 EVENT RECORDER, CARDIAC: HCPCS | Performed by: INTERNAL MEDICINE

## 2022-02-07 PROCEDURE — 93458 L HRT ARTERY/VENTRICLE ANGIO: CPT | Performed by: INTERNAL MEDICINE

## 2022-02-07 PROCEDURE — 82962 GLUCOSE BLOOD TEST: CPT

## 2022-02-07 DEVICE — ICM LP/RECRD REVEAL LINQ MEDTRONIC: Type: IMPLANTABLE DEVICE | Status: FUNCTIONAL

## 2022-02-07 RX ORDER — SODIUM CHLORIDE 0.9 % (FLUSH) 0.9 %
10 SYRINGE (ML) INJECTION AS NEEDED
Status: DISCONTINUED | OUTPATIENT
Start: 2022-02-07 | End: 2022-02-07 | Stop reason: HOSPADM

## 2022-02-07 RX ORDER — MIDAZOLAM HYDROCHLORIDE 1 MG/ML
INJECTION INTRAMUSCULAR; INTRAVENOUS AS NEEDED
Status: DISCONTINUED | OUTPATIENT
Start: 2022-02-07 | End: 2022-02-07 | Stop reason: HOSPADM

## 2022-02-07 RX ORDER — FENTANYL CITRATE 50 UG/ML
INJECTION, SOLUTION INTRAMUSCULAR; INTRAVENOUS AS NEEDED
Status: DISCONTINUED | OUTPATIENT
Start: 2022-02-07 | End: 2022-02-07 | Stop reason: HOSPADM

## 2022-02-07 RX ORDER — EXENATIDE 2 MG/.65ML
2 INJECTION, SUSPENSION, EXTENDED RELEASE SUBCUTANEOUS WEEKLY
COMMUNITY
End: 2023-01-24

## 2022-02-07 RX ORDER — LIDOCAINE HYDROCHLORIDE 20 MG/ML
INJECTION, SOLUTION INFILTRATION; PERINEURAL AS NEEDED
Status: DISCONTINUED | OUTPATIENT
Start: 2022-02-07 | End: 2022-02-07 | Stop reason: HOSPADM

## 2022-02-07 RX ORDER — SODIUM CHLORIDE 9 MG/ML
75 INJECTION, SOLUTION INTRAVENOUS CONTINUOUS
Status: DISCONTINUED | OUTPATIENT
Start: 2022-02-07 | End: 2022-02-07 | Stop reason: HOSPADM

## 2022-02-07 RX ORDER — SODIUM CHLORIDE 0.9 % (FLUSH) 0.9 %
10 SYRINGE (ML) INJECTION EVERY 12 HOURS SCHEDULED
Status: DISCONTINUED | OUTPATIENT
Start: 2022-02-07 | End: 2022-02-07 | Stop reason: HOSPADM

## 2022-02-07 RX ORDER — LEVOTHYROXINE SODIUM 0.05 MG/1
50 TABLET ORAL DAILY
COMMUNITY
End: 2022-06-20 | Stop reason: SDUPTHER

## 2022-02-07 RX ORDER — SODIUM CHLORIDE 0.9 % (FLUSH) 0.9 %
3 SYRINGE (ML) INJECTION EVERY 12 HOURS SCHEDULED
Status: DISCONTINUED | OUTPATIENT
Start: 2022-02-07 | End: 2022-02-07 | Stop reason: HOSPADM

## 2022-02-07 RX ORDER — ACETAMINOPHEN 325 MG/1
650 TABLET ORAL EVERY 4 HOURS PRN
Status: DISCONTINUED | OUTPATIENT
Start: 2022-02-07 | End: 2022-02-07 | Stop reason: HOSPADM

## 2022-02-07 NOTE — PERIOPERATIVE NURSING NOTE
Spoke with Dr Lundy to clarify when pt is to remove her Loop Recorder dressing.  He says to remove tomorrow, but to leave steristrips in place & allow to wear off on it's own.  Pt is allowed to shower tomorrow.  Pt is okay to take all of her home medications today (except the diabetes medication that contains Metformin should be held for 48 hours per protocol).

## 2022-02-07 NOTE — DISCHARGE INSTRUCTIONS
[facility]  [address] [place] [address]    Coronary Angiogram (Radial/Ulnar Approach) After Care    Refer to this sheet in the next few weeks. These instructions provide you with information on caring for yourself after your procedure. Your caregiver may also give you more specific instructions. Your treatment has been planned according to current medical practices, but problems sometimes occur. Call your caregiver if you have any problems or questions after your procedure.    Home Care Instructions:  · You may shower the day after the procedure. Remove the bandage (dressing) and gently wash the site with plain soap and water. Gently pat the site dry. You may apply a band aid daily for 2 days if desired.    · Do not apply powder or lotion to the site.  · Do not submerge the affected site in water for 3 to 5 days or until the site is completely healed.   · Do not lift, push or pull anything over 5 pounds for 5 days after your procedure or as directed by your physician.  As a reference, a gallon of milk weighs 8 pounds.   · Inspect the site at least twice daily. You may notice some bruising at the site and it may be tender for 1 to 2 weeks.     · Increase your fluid intake for the next 2 days.    · Keep arm elevated for 24 hours. For the remainder of the day, keep your arm in “Pledge of Allegiance” position when up and about.     · You may drive 24 hours after the procedure unless otherwise instructed by your caregiver.  · Do not operate machinery or power tools for 24 hours.  · A responsible adult should be with you for the first 24 hours after you arrive home. Do not make any important legal decisions or sign legal papers for 24 hours.  Do not drink alcohol for 24 hours.    · Metformin or any medications containing Metformin should not be taken for 48 hours after your procedure.      Call Your Doctor if:   · You have unusual pain at the radial/ulnar (wrist) site.  · You have redness, warmth,  swelling, or pain at the radial/ulnar (wrist) site.  · You have drainage (other than a small amount of blood on the dressing).  · `You have chills or a fever > 101.  · Your arm becomes pale or dark, cool, tingly, or numb.  · You develop chest pain, shortness of breath, feel faint or pass out.    · You have heavy bleeding from the site, hold pressure on the site for 20 minutes.  If the bleeding stops, apply a fresh bandage and call your cardiologist.  However, if you        continue to have bleeding, call 911 and continue to apply pressure to the site.   · You have any symptoms of a stroke.  Remember BE FAST  · B-balance. Sudden trouble walking or loss of balance.  · E-eyes.  Sudden changes in how you see or a sudden onset of a very bad headache.   · F-face. Sudden weakness or loss of feeling of the face or facial droop on one side.   · A-arms Sudden weakness or numbness in one arm.  One arm drifts down if they are both held out in front of you. This happens suddenly and usually on one side of the body.   · S-speech.  Sudden trouble speaking, slurred speech or trouble understanding what are saying.   · T-time  Time to call emergency services.  Write down the symptoms and the time they started.

## 2022-02-07 NOTE — PERIOPERATIVE NURSING NOTE
Nilay with [employer] called & spoke with pt on the phone to review set up for LINQ box at home. He answered pt's questions & she confirmed understanding.

## 2022-02-14 ENCOUNTER — TELEPHONE (OUTPATIENT)
Dept: FAMILY MEDICINE CLINIC | Facility: CLINIC | Age: 72
End: 2022-02-14

## 2022-02-14 NOTE — TELEPHONE ENCOUNTER
----- Message from Val Irvin MD sent at 2/6/2022 10:22 PM EST -----  Lisa Doe is new to the practice, make sure she can see the [employer] message thanks    Lisa,  A1C is at goal at 7.0  Total HDL and LDL cholesterol are great, triglycerides are a little above ideal.  Minor abnormalities in blood count of uncertain clinical significance especilly with labs a week earlier being normal.  Thyroid testing is normal    Had not really intended labs I order be done immidiatly, though you wanted to do prior to next appt in 3 months.    I am placing order for A1C, CBC, CMP and Cholesterol to do in 3 months and would like you to schedule an appt around the 1st of may to review the results

## 2022-03-02 PROCEDURE — 93298 REM INTERROG DEV EVAL SCRMS: CPT | Performed by: INTERNAL MEDICINE

## 2022-03-02 PROCEDURE — G2066 INTER DEVC REMOTE 30D: HCPCS | Performed by: INTERNAL MEDICINE

## 2022-03-23 ENCOUNTER — TELEPHONE (OUTPATIENT)
Dept: FAMILY MEDICINE CLINIC | Facility: CLINIC | Age: 72
End: 2022-03-23

## 2022-03-23 DIAGNOSIS — I10 HYPERTENSION, UNSPECIFIED TYPE: Primary | ICD-10-CM

## 2022-03-23 RX ORDER — POTASSIUM CHLORIDE 20 MEQ/1
20 TABLET, EXTENDED RELEASE ORAL DAILY
Qty: 90 TABLET | Refills: 1 | Status: SHIPPED | OUTPATIENT
Start: 2022-03-23 | End: 2022-08-11 | Stop reason: SDUPTHER

## 2022-03-23 NOTE — TELEPHONE ENCOUNTER
Caller: Lula Burnett    Relationship: Self    Best call back number: [phone]    Requested Prescriptions:   Requested Prescriptions     Pending Prescriptions Disp Refills   • potassium chloride (K-DUR,KLOR-CON) 20 MEQ CR tablet       Si tablet Daily.        Pharmacy where request should be sent: [facility] #[id number] - [place], [place] - [address] [place] [address] - [phone]  - [phone] FX     Additional details provided by patient: PATIENT IS COMPLETELY OUT OF THIS MEDICATION.     Does the patient have less than a 3 day supply:  [x] Yes  [] No    Gt Manrique Rep   22 10:49 EDT

## 2022-03-23 NOTE — TELEPHONE ENCOUNTER
Caller: Lula Burnett    Relationship: Self    Best call back number: [phone]    What is the best time to reach you: ANYTIME    Who are you requesting to speak with (clinical staff, provider,  specific staff member): DR. VALENCIA    What was the call regarding: PATIENT WANTED TO LET DR. VALENCIA KNOW THAT [facility] GOING TO BE SENDING ALL OF HER MEDICATION REFILL REQUEST.

## 2022-04-01 DIAGNOSIS — N95.1 MENOPAUSAL SYNDROME (HOT FLASHES): ICD-10-CM

## 2022-04-01 NOTE — TELEPHONE ENCOUNTER
Caller: Lula Burnett    Relationship: Self    Best call back number: [phone]    Requested Prescriptions:   Requested Prescriptions     Pending Prescriptions Disp Refills   • estrogens, conjugated, (PREMARIN) 0.9 MG tablet 30 tablet 1     Sig: Take 1 tablet by mouth Daily.        Pharmacy where request should be sent: [facility] #[id number] - [place], [place] - [address] [place] [address] - [phone]  - [phone] FX     Additional details provided by patient: PATIENT STATES SHE HAS 2 TABLETS REMAINING, REQUEST A 90 DAY REFILL WITH 3 REFILLS    Does the patient have less than a 3 day supply:  [x] Yes  [] No    Gt Faulkner Rep   04/01/22 10:34 EDT

## 2022-04-01 NOTE — TELEPHONE ENCOUNTER
Please contact patient and ask if she is willing to reduce this further to a 0.625 mg tablet?  He has reduced from 1.25 mg at last visit due to increased cardiovascular disease risk with Estrogen

## 2022-04-02 PROCEDURE — 93298 REM INTERROG DEV EVAL SCRMS: CPT | Performed by: INTERNAL MEDICINE

## 2022-04-02 PROCEDURE — G2066 INTER DEVC REMOTE 30D: HCPCS | Performed by: INTERNAL MEDICINE

## 2022-04-19 NOTE — TELEPHONE ENCOUNTER
PATIENT IS CALLING IN SHE STATES THAT SHE FEELS THE 0.9 MG ON THIS IS NOT ENOUGH AND IS ASKING IF DOCTOR CAN PUT HER BACK TO 1.25 AND SEND IN REFILL FOR THAT.    PLEASE ADVISE    CALLBACK NUMBER IS  [phone]

## 2022-04-19 NOTE — TELEPHONE ENCOUNTER
Please inform patient that I am not comfortable increasing the progesterone dose if she would have a mammogram and come in for an annual exam, assuming mammogram is negative we could discuss  increasing estrogen versus alternative therapies to control symptoms.  If willing please order mammogram (and DEXA scan if also willing to do this) and have her schedule a Medicare wellness exam

## 2022-06-03 ENCOUNTER — OFFICE VISIT (OUTPATIENT)
Dept: FAMILY MEDICINE CLINIC | Facility: CLINIC | Age: 72
End: 2022-06-03

## 2022-06-03 VITALS
TEMPERATURE: 97.5 F | DIASTOLIC BLOOD PRESSURE: 72 MMHG | WEIGHT: 156.4 LBS | BODY MASS INDEX: 28.78 KG/M2 | RESPIRATION RATE: 16 BRPM | HEART RATE: 86 BPM | OXYGEN SATURATION: 98 % | HEIGHT: 62 IN | SYSTOLIC BLOOD PRESSURE: 160 MMHG

## 2022-06-03 DIAGNOSIS — I10 PRIMARY HYPERTENSION: ICD-10-CM

## 2022-06-03 DIAGNOSIS — Z79.890 ENCOUNTER FOR MONITORING POSTMENOPAUSAL ESTROGEN REPLACEMENT THERAPY: ICD-10-CM

## 2022-06-03 DIAGNOSIS — E11.9 TYPE 2 DIABETES MELLITUS WITHOUT COMPLICATION, WITHOUT LONG-TERM CURRENT USE OF INSULIN: Primary | ICD-10-CM

## 2022-06-03 DIAGNOSIS — Z51.81 ENCOUNTER FOR MONITORING POSTMENOPAUSAL ESTROGEN REPLACEMENT THERAPY: ICD-10-CM

## 2022-06-03 DIAGNOSIS — I10 ESSENTIAL HYPERTENSION: ICD-10-CM

## 2022-06-03 DIAGNOSIS — N95.1 MENOPAUSAL SYNDROME (HOT FLASHES): ICD-10-CM

## 2022-06-03 DIAGNOSIS — Z12.31 ENCOUNTER FOR SCREENING MAMMOGRAM FOR BREAST CANCER: ICD-10-CM

## 2022-06-03 PROCEDURE — 99214 OFFICE O/P EST MOD 30 MIN: CPT | Performed by: FAMILY MEDICINE

## 2022-06-03 RX ORDER — FLURBIPROFEN SODIUM 0.3 MG/ML
1 SOLUTION/ DROPS OPHTHALMIC DAILY
Qty: 100 EACH | Refills: 3 | Status: SHIPPED | OUTPATIENT
Start: 2022-06-03

## 2022-06-03 RX ORDER — ATORVASTATIN CALCIUM 80 MG/1
TABLET, FILM COATED ORAL
COMMUNITY
Start: 2022-04-20 | End: 2022-08-01 | Stop reason: SDUPTHER

## 2022-06-03 RX ORDER — INDOMETHACIN 25 MG
360 CAPSULE ORAL DAILY
COMMUNITY

## 2022-06-03 RX ORDER — LOSARTAN POTASSIUM 50 MG/1
50 TABLET ORAL DAILY
Qty: 90 TABLET | Refills: 3 | Status: SHIPPED | OUTPATIENT
Start: 2022-06-03 | End: 2023-01-24

## 2022-06-03 RX ORDER — VIT C/B6/B5/MAGNESIUM/HERB 173 50-5-6-5MG
CAPSULE ORAL
COMMUNITY

## 2022-06-03 RX ORDER — MONTELUKAST SODIUM 10 MG/1
TABLET ORAL
COMMUNITY
Start: 2022-03-13 | End: 2022-06-29 | Stop reason: SDUPTHER

## 2022-06-03 RX ORDER — ADALIMUMAB 40MG/0.4ML
KIT SUBCUTANEOUS
COMMUNITY
Start: 2022-03-08 | End: 2022-06-03 | Stop reason: SDUPTHER

## 2022-06-03 RX ORDER — MELATONIN
2000 DAILY
COMMUNITY

## 2022-06-03 NOTE — PROGRESS NOTES
Chief Complaint  Diabetes and Hypertension     History of Present Illness  Lula Burnett presents is returning today for a second visit for a follow up on  diabetes, htn, and menopausal syndrome.  At initial appointment February 1, 2022 after discussion of increased cardio risk especially with history of coronary artery disease with angina we reduced Premarin from 1.25 mg to 0.9 mg daily.     patient is taking medications as per list without missing doses.  She reports home blood pressure readings in the 130/65 to 180/90 typically around 160's.     Diabetes, patient reports diagnosed approximately 20 years ago.  She states she has never been on a higher dose of metformin and is afraid to increase from the 1000 mg daily she is currently taking.  She does report during a recent hospitalization she was on insulin 3 times a day felt much better on insulin.  She reports blood sugars not currently well controlled.  Her A1C on 5/20/22 was a 7.7.    Dr Dimas, implanted a loop recorder,  follow up 6 months.   Patient states has been told cannot get mammogram with an implanted loop recorder    Current Outpatient Medications on File Prior to Visit   Medication Sig   • Adalimumab (Humira Pen) 40 MG/0.8ML Pen-injector Kit Inject  under the skin into the appropriate area as directed Every 14 (Fourteen) Days. [facility] for psoriasis   • ALPRAZolam (XANAX) 0.25 MG tablet Take 0.5 tablets by mouth 2 (Two) Times a Day.   • atorvastatin (LIPITOR) 80 MG tablet    • B Complex-C-Folic Acid (B-COMPLEX/FOLIC ACID/VITAMIN C PO) Take  by mouth.   • Calcium Carb-Cholecalciferol (CALCIUM 1000 + D PO) Take 1,200 mg by mouth Daily. D3 1000 MG   • carvedilol (COREG) 6.25 MG tablet 6.25 mg 2 (Two) Times a Day With Meals.   • cetirizine (zyrTEC) 10 MG tablet Take 10 mg by mouth Daily.   • cholecalciferol (VITAMIN D3) 25 MCG (1000 UT) tablet Take 2,000 Units by mouth Daily.   • clopidogrel (PLAVIX) 75 MG tablet    •  "cyclobenzaprine (FLEXERIL) 10 MG tablet Take 10 mg by mouth 3 (Three) Times a Day As Needed.   • digoxin (LANOXIN) 250 MCG tablet 250 mcg.   • exenatide er (Bydureon) 2 MG pen-injector injection Inject 2 mg under the skin into the appropriate area as directed 1 (One) Time Per Week.   • fluticasone-salmeterol (Advair Diskus) 250-50 MCG/DOSE DISKUS Inhale 1 puff 2 (Two) Times a Day.   • ibuprofen (ADVIL,MOTRIN) 600 MG tablet Take 600 mg by mouth Every 6 (Six) Hours As Needed for Mild Pain .   • levothyroxine (SYNTHROID, LEVOTHROID) 50 MCG tablet Take 50 mcg by mouth Daily.   • meclizine (ANTIVERT) 25 MG tablet 3 (Three) Times a Day As Needed.   • Misc Natural Products (Leg Vein & Circulation) tablet Take 360 mg by mouth Daily.   • montelukast (SINGULAIR) 10 MG tablet    • Multiple Vitamins-Minerals (CENTRUM FRESH/FRUITY 50+ PO) Take  by mouth.   • ondansetron ODT (ZOFRAN-ODT) 8 MG disintegrating tablet Place 8 mg on the tongue Every 8 (Eight) Hours As Needed.   • pantoprazole (PROTONIX) 40 MG EC tablet    • PARoxetine (PAXIL) 20 MG tablet Take  by mouth Daily.   • potassium chloride (K-DUR,KLOR-CON) 20 MEQ CR tablet Take 1 tablet by mouth Daily.   • ProAir  (90 Base) MCG/ACT inhaler Inhale 2 puffs Every 4 (Four) Hours As Needed.   • Synjardy XR  MG tablet sustained-release 24 hour Take 1 tablet by mouth Daily.   • topiramate (TOPAMAX) 25 MG tablet 2 (Two) Times a Day.   • triamterene-hydrochlorothiazide (MAXZIDE) 75-50 MG per tablet Take 1 tablet by mouth Daily.   • Turmeric 500 MG capsule Take  by mouth.   • nitroglycerin (NITROSTAT) 0.4 MG SL tablet 1 under the tongue as needed for angina, may repeat q5mins for up three doses     No current facility-administered medications on file prior to visit.       Objective   Vital Signs:   /72   Pulse 86   Temp 97.5 °F (36.4 °C)   Resp 16   Ht 157.5 cm (62.01\")   Wt 70.9 kg (156 lb 6.4 oz)   SpO2 98%   BMI 28.60 kg/m²       Physical Exam  Vitals and " nursing note reviewed.   Constitutional:       General: She is not in acute distress.     Appearance: She is well-developed.   HENT:      Head: Normocephalic and atraumatic.      Right Ear: External ear normal.      Left Ear: External ear normal.   Eyes:      Conjunctiva/sclera: Conjunctivae normal.      Pupils: Pupils are equal, round, and reactive to light.   Neck:      Thyroid: No thyromegaly.      Trachea: No tracheal deviation.   Cardiovascular:      Rate and Rhythm: Normal rate and regular rhythm.      Heart sounds: No murmur heard.    No friction rub. No gallop.   Pulmonary:      Effort: Pulmonary effort is normal.      Breath sounds: Normal breath sounds. No wheezing or rales.   Abdominal:      General: Bowel sounds are normal.      Palpations: Abdomen is soft.      Tenderness: There is no abdominal tenderness.   Musculoskeletal:         General: Normal range of motion.      Cervical back: Normal range of motion and neck supple.   Lymphadenopathy:      Cervical: No cervical adenopathy.   Skin:     General: Skin is warm and dry.      Findings: No rash.   Neurological:      Mental Status: She is alert and oriented to person, place, and time.   Psychiatric:         Behavior: Behavior normal.            No visits with results within 1 Day(s) from this visit.   Latest known visit with results is:   Results Encounter on 04/25/2022   Component Date Value Ref Range Status   • Hemoglobin A1C 05/20/2022 7.7 (A) 4.8 - 5.6 % Final    Comment:          Prediabetes: 5.7 - 6.4           Diabetes: >6.4           Glycemic control for adults with diabetes: <7.0     • Glucose 05/20/2022 118 (A) 65 - 99 mg/dL Final   • BUN 05/20/2022 27  8 - 27 mg/dL Final   • Creatinine 05/20/2022 1.02 (A) 0.57 - 1.00 mg/dL Final   • EGFR Result 05/20/2022 58 (A) >59 mL/min/1.73 Final   • BUN/Creatinine Ratio 05/20/2022 26  12 - 28 Final   • Sodium 05/20/2022 140  134 - 144 mmol/L Final   • Potassium 05/20/2022 4.1  3.5 - 5.2 mmol/L Final   •  Chloride 05/20/2022 93 (A) 96 - 106 mmol/L Final   • Total CO2 05/20/2022 29  20 - 29 mmol/L Final   • Calcium 05/20/2022 9.9  8.7 - 10.3 mg/dL Final   • Total Protein 05/20/2022 7.2  6.0 - 8.5 g/dL Final   • Albumin 05/20/2022 4.6  3.7 - 4.7 g/dL Final   • Globulin 05/20/2022 2.6  1.5 - 4.5 g/dL Final   • A/G Ratio 05/20/2022 1.8  1.2 - 2.2 Final   • Total Bilirubin 05/20/2022 0.3  0.0 - 1.2 mg/dL Final   • Alkaline Phosphatase 05/20/2022 86  44 - 121 IU/L Final   • AST (SGOT) 05/20/2022 22  0 - 40 IU/L Final   • ALT (SGPT) 05/20/2022 21  0 - 32 IU/L Final   • Total Cholesterol 05/20/2022 169  100 - 199 mg/dL Final   • Triglycerides 05/20/2022 468 (A) 0 - 149 mg/dL Final   • HDL Cholesterol 05/20/2022 57  >39 mg/dL Final   • VLDL Cholesterol Sarthak 05/20/2022 67 (A) 5 - 40 mg/dL Final   • LDL Chol Calc (NIH) 05/20/2022 45  0 - 99 mg/dL Final   • WBC 05/20/2022 10.0  3.4 - 10.8 x10E3/uL Final   • RBC 05/20/2022 5.39 (A) 3.77 - 5.28 x10E6/uL Final   • Hemoglobin 05/20/2022 14.7  11.1 - 15.9 g/dL Final   • Hematocrit 05/20/2022 45.4  34.0 - 46.6 % Final   • MCV 05/20/2022 84  79 - 97 fL Final   • MCH 05/20/2022 27.3  26.6 - 33.0 pg Final   • MCHC 05/20/2022 32.4  31.5 - 35.7 g/dL Final   • RDW 05/20/2022 15.2  11.7 - 15.4 % Final   • Platelets 05/20/2022 361  150 - 450 x10E3/uL Final   • Neutrophil Rel % 05/20/2022 50  Not Estab. % Final   • Lymphocyte Rel % 05/20/2022 43  Not Estab. % Final   • Monocyte Rel % 05/20/2022 6  Not Estab. % Final   • Eosinophil Rel % 05/20/2022 1  Not Estab. % Final   • Basophil Rel % 05/20/2022 0  Not Estab. % Final   • Neutrophils Absolute 05/20/2022 4.9  1.4 - 7.0 x10E3/uL Final   • Lymphocytes Absolute 05/20/2022 4.3 (A) 0.7 - 3.1 x10E3/uL Final   • Monocytes Absolute 05/20/2022 0.6  0.1 - 0.9 x10E3/uL Final   • Eosinophils Absolute 05/20/2022 0.1  0.0 - 0.4 x10E3/uL Final   • Basophils Absolute 05/20/2022 0.0  0.0 - 0.2 x10E3/uL Final   • Immature Granulocyte Rel % 05/20/2022 0  Not  Estab. % Final   • Immature Grans Absolute 05/20/2022 0.0  0.0 - 0.1 x10E3/uL Final       Lab Results   Component Value Date    BUN 27 05/20/2022    CREATININE 1.02 (H) 05/20/2022     05/20/2022    K 4.1 05/20/2022    CL 93 (L) 05/20/2022    CALCIUM 9.9 05/20/2022    ALBUMIN 4.6 05/20/2022    BILITOT 0.3 05/20/2022    ALKPHOS 86 05/20/2022    AST 22 05/20/2022    ALT 21 05/20/2022    CHLPL 169 05/20/2022    TRIG 468 (H) 05/20/2022    HDL 57 05/20/2022    VLDL 67 (H) 05/20/2022    LDL 45 05/20/2022    WBC 10.0 05/20/2022    RBC 5.39 (H) 05/20/2022    HCT 45.4 05/20/2022    MCV 84 05/20/2022    MCH 27.3 05/20/2022        Lab Results   Component Value Date    HGBA1C 7.7 (H) 05/20/2022    HGBA1C 7.0 (H) 02/04/2022                Assessment and Plan    Diagnoses and all orders for this visit:    1. Type 2 diabetes mellitus without complication, without long-term current use of insulin (HCC) (Primary)  -     Insulin Glargine (LANTUS SOLOSTAR) 100 UNIT/ML injection pen; Inject 10 Units under the skin into the appropriate area as directed Every Night. Reduce by 1 unit if am glucose <80. Increase by 1 unit if >160.  Dispense: 15 mL; Refill: 1  -     Insulin Pen Needle (B-D UF III MINI PEN NEEDLES) 31G X 5 MM misc; 1 Piece Daily.  Dispense: 100 each; Refill: 3    2. Essential hypertension  -     losartan (COZAAR) 50 MG tablet; Take 1 tablet by mouth Daily.  Dispense: 90 tablet; Refill: 3    3. Menopausal syndrome (hot flashes)  -     estrogens, conjugated, (PREMARIN) 1.25 MG tablet; Take 1 tablet by mouth Daily.  Dispense: 30 tablet; Refill: 1    4. Primary hypertension    5. Encounter for screening mammogram for breast cancer  -     estrogens, conjugated, (PREMARIN) 1.25 MG tablet; Take 1 tablet by mouth Daily.  Dispense: 30 tablet; Refill: 1  -     Mammo Screening Digital Tomosynthesis Bilateral With CAD; Future    6. Encounter for monitoring postmenopausal estrogen replacement therapy  -     estrogens, conjugated,  (PREMARIN) 1.25 MG tablet; Take 1 tablet by mouth Daily.  Dispense: 30 tablet; Refill: 1        Diabetes, A1c has increased from 7 to 7.7, pt desires tighter control disused treatment options starting lantus at hs    Hyperlipidemia, cholesterol well controlled however triglycerides remaining well above goal. Low fat and low concentrated sweets diet and increase physical activity for better glycemic and lipid management.     Menopause, symptoms intolerable with reduced dos of estrogen, pt declines alterative therapies with alpha blockers or antidepressants, pt insisting on increasing premarin and accepts increased CV and breast cancer risk.     Medications Discontinued During This Encounter   Medication Reason   • Humira Pen 40 MG/0.4ML Pen-injector Kit Duplicate order   • atorvastatin (LIPITOR) 40 MG tablet Duplicate order   • estrogens, conjugated, (PREMARIN) 0.9 MG tablet          Follow Up     Return in about 3 months (around 9/3/2022) for htn, diabetes, and october for MWE.    Patient was given instructions and counseling regarding her condition or for health maintenance advice. Please see specific information pulled into the AVS if appropriate.

## 2022-06-10 ENCOUNTER — TELEPHONE (OUTPATIENT)
Dept: FAMILY MEDICINE CLINIC | Facility: CLINIC | Age: 72
End: 2022-06-10

## 2022-06-10 NOTE — TELEPHONE ENCOUNTER
SEE BELOW    Caller: Lula Burnett ROBERT    Relationship: Self    Best call back number: [phone]  What medications are you currently taking:   Current Outpatient Medications on File Prior to Visit   Medication Sig Dispense Refill   • Adalimumab (Humira Pen) 40 MG/0.8ML Pen-injector Kit Inject  under the skin into the appropriate area as directed Every 14 (Fourteen) Days. [facility] for psoriasis 4 each 12   • ALPRAZolam (XANAX) 0.25 MG tablet Take 0.5 tablets by mouth 2 (Two) Times a Day. 90 tablet 1   • atorvastatin (LIPITOR) 80 MG tablet      • B Complex-C-Folic Acid (B-COMPLEX/FOLIC ACID/VITAMIN C PO) Take  by mouth.     • Calcium Carb-Cholecalciferol (CALCIUM 1000 + D PO) Take 1,200 mg by mouth Daily. D3 1000 MG     • carvedilol (COREG) 6.25 MG tablet 6.25 mg 2 (Two) Times a Day With Meals.     • cetirizine (zyrTEC) 10 MG tablet Take 10 mg by mouth Daily.     • cholecalciferol (VITAMIN D3) 25 MCG (1000 UT) tablet Take 2,000 Units by mouth Daily.     • clopidogrel (PLAVIX) 75 MG tablet      • cyclobenzaprine (FLEXERIL) 10 MG tablet Take 10 mg by mouth 3 (Three) Times a Day As Needed.     • digoxin (LANOXIN) 250 MCG tablet 250 mcg.     • estrogens, conjugated, (PREMARIN) 1.25 MG tablet Take 1 tablet by mouth Daily. 30 tablet 1   • exenatide er (Bydureon) 2 MG pen-injector injection Inject 2 mg under the skin into the appropriate area as directed 1 (One) Time Per Week.     • fluticasone-salmeterol (Advair Diskus) 250-50 MCG/DOSE DISKUS Inhale 1 puff 2 (Two) Times a Day. 180 each 3   • ibuprofen (ADVIL,MOTRIN) 600 MG tablet Take 600 mg by mouth Every 6 (Six) Hours As Needed for Mild Pain .     • Insulin Glargine (LANTUS SOLOSTAR) 100 UNIT/ML injection pen Inject 10 Units under the skin into the appropriate area as directed Every Night. Reduce by 1 unit if am glucose <80. Increase by 1 unit if >160. 15 mL 1   • Insulin Pen Needle (B-D UF III MINI PEN NEEDLES) 31G X 5 MM misc 1 Piece Daily. 100  each 3   • levothyroxine (SYNTHROID, LEVOTHROID) 50 MCG tablet Take 50 mcg by mouth Daily.     • losartan (COZAAR) 50 MG tablet Take 1 tablet by mouth Daily. 90 tablet 3   • meclizine (ANTIVERT) 25 MG tablet 3 (Three) Times a Day As Needed.     • Misc Natural Products (Leg Vein & Circulation) tablet Take 360 mg by mouth Daily.     • montelukast (SINGULAIR) 10 MG tablet      • Multiple Vitamins-Minerals (CENTRUM FRESH/FRUITY 50+ PO) Take  by mouth.     • nitroglycerin (NITROSTAT) 0.4 MG SL tablet 1 under the tongue as needed for angina, may repeat q5mins for up three doses 20 tablet 1   • ondansetron ODT (ZOFRAN-ODT) 8 MG disintegrating tablet Place 8 mg on the tongue Every 8 (Eight) Hours As Needed.     • pantoprazole (PROTONIX) 40 MG EC tablet      • PARoxetine (PAXIL) 20 MG tablet Take  by mouth Daily.     • potassium chloride (K-DUR,KLOR-CON) 20 MEQ CR tablet Take 1 tablet by mouth Daily. 90 tablet 1   • ProAir  (90 Base) MCG/ACT inhaler Inhale 2 puffs Every 4 (Four) Hours As Needed.     • Synjardy XR  MG tablet sustained-release 24 hour Take 1 tablet by mouth Daily.     • topiramate (TOPAMAX) 25 MG tablet 2 (Two) Times a Day.     • triamterene-hydrochlorothiazide (MAXZIDE) 75-50 MG per tablet Take 1 tablet by mouth Daily.     • Turmeric 500 MG capsule Take  by mouth.       No current facility-administered medications on file prior to visit.        Which medication are you concerned about: exenatide er (Bydureon) 2 MG pen-injector injection    What are your concerns: PATIENT IS NOW TAKING Insulin Glargine (LANTUS SOLOSTAR) 100 UNIT/ML injection pen  DOES SHE NEED TO CONTINUE TAKING BY BYDUREON?  PLEASE ADVISE

## 2022-06-13 ENCOUNTER — APPOINTMENT (OUTPATIENT)
Dept: MAMMOGRAPHY | Facility: HOSPITAL | Age: 72
End: 2022-06-13

## 2022-06-16 NOTE — TELEPHONE ENCOUNTER
Please contact patient and clarify that she is not having any low sugars.  When I had seen her recently, her A1c had increased and we added insulin as below.  Had not intended her to stop Bydureon.     Insulin Glargine (LANTUS SOLOSTAR) 100 UNIT/ML injection pen; Inject 10 Units under the skin into the appropriate area as directed Every Night. Reduce by 1 unit if am glucose <80. Increase by 1 unit if >160

## 2022-06-20 NOTE — TELEPHONE ENCOUNTER
I spoke with Lula and she reports that she is still taking the Bydureon and will start the Insulin when blood glucose monitor arrives. She did state that she d/c her losartan because of severe GI upset

## 2022-06-20 NOTE — TELEPHONE ENCOUNTER
Caller: Lula Burnett    Relationship: Self    Best call back number: [phone]       Requested Prescriptions:   Requested Prescriptions     Pending Prescriptions Disp Refills   • levothyroxine (SYNTHROID, LEVOTHROID) 50 MCG tablet       Sig: Take 1 tablet by mouth Daily.        Pharmacy where request should be sent: [facility] #[id number] - [place], [place] - [address] [place] [address] - [phone]  - [phone] FX     Additional details provided by patient: PATIENT IS OUT OF THIS MEDICATION.     Does the patient have less than a 3 day supply:  [x] Yes  [] No    Gt Blake Rep   06/20/22 15:23 EDT

## 2022-06-20 NOTE — TELEPHONE ENCOUNTER
Patient reported some very high readings before we started losartan, please ask patient what blood pressure readings have been since she stopped losartan.  If readings are above 140/90 would recommend starting another antihypertensive.  Please ask if she has had side effects on anything else in the past.

## 2022-06-21 ENCOUNTER — TELEPHONE (OUTPATIENT)
Dept: FAMILY MEDICINE CLINIC | Facility: CLINIC | Age: 72
End: 2022-06-21

## 2022-06-21 ENCOUNTER — HOSPITAL ENCOUNTER (OUTPATIENT)
Dept: MAMMOGRAPHY | Facility: HOSPITAL | Age: 72
Discharge: HOME | End: 2022-06-21
Admitting: FAMILY MEDICINE

## 2022-06-21 DIAGNOSIS — Z12.31 ENCOUNTER FOR SCREENING MAMMOGRAM FOR BREAST CANCER: ICD-10-CM

## 2022-06-21 PROCEDURE — 77063 BREAST TOMOSYNTHESIS BI: CPT

## 2022-06-21 PROCEDURE — 77067 SCR MAMMO BI INCL CAD: CPT

## 2022-06-21 RX ORDER — LEVOTHYROXINE SODIUM 0.05 MG/1
50 TABLET ORAL DAILY
Qty: 90 TABLET | Refills: 3 | Status: SHIPPED | OUTPATIENT
Start: 2022-06-21 | End: 2023-01-24 | Stop reason: SDUPTHER

## 2022-06-21 NOTE — TELEPHONE ENCOUNTER
Lisa called into the office this morning asking if she should take her blood sugar before she eats in the morning or after for her Lantus dose?

## 2022-06-21 NOTE — TELEPHONE ENCOUNTER
She should take Lantus at bedtime, check sugar first thing in the morning before she has eaten and any adjustments to Lantus should be based on the mornings glucose reading.

## 2022-06-26 NOTE — PROGRESS NOTES
Laurel,  Please contact patient's and let her know there was an asymmetry in the right breast that requires additional imaging for complete evaluation.  She needs Diagnostic right mammogram and focused right breast ultrasound. If not already ordered and scheduled please order and facilitate getting scheduled.

## 2022-06-27 DIAGNOSIS — R92.8 ABNORMAL MAMMOGRAM: ICD-10-CM

## 2022-06-27 DIAGNOSIS — Z12.31 ENCOUNTER FOR SCREENING MAMMOGRAM FOR MALIGNANT NEOPLASM OF BREAST: Primary | ICD-10-CM

## 2022-06-29 ENCOUNTER — TELEPHONE (OUTPATIENT)
Dept: FAMILY MEDICINE CLINIC | Facility: CLINIC | Age: 72
End: 2022-06-29

## 2022-06-29 NOTE — TELEPHONE ENCOUNTER
Caller: Lula Burnett    Relationship: Self    Best call back number: [phone]      What is the best time to reach you: ANYTIME    Who are you requesting to speak with (clinical staff, provider,  specific staff member): MA        What was the call regarding: PATIENT IS CALLING IN AND SHE WOULD LIKE CALLBACK TO DISCUSS HER BP AND HER SUGAR LEVELS.    Do you require a callback: YES

## 2022-07-01 RX ORDER — CETIRIZINE HYDROCHLORIDE 10 MG/1
TABLET ORAL
Qty: 90 TABLET | Refills: 0 | Status: SHIPPED | OUTPATIENT
Start: 2022-07-01 | End: 2022-09-30 | Stop reason: SDUPTHER

## 2022-07-01 RX ORDER — MONTELUKAST SODIUM 10 MG/1
10 TABLET ORAL NIGHTLY
Qty: 30 TABLET | Refills: 0 | Status: SHIPPED | OUTPATIENT
Start: 2022-07-01 | End: 2022-07-01

## 2022-07-01 RX ORDER — CETIRIZINE HYDROCHLORIDE 10 MG/1
10 TABLET ORAL DAILY
Qty: 30 TABLET | Refills: 0 | Status: SHIPPED | OUTPATIENT
Start: 2022-07-01 | End: 2022-07-01

## 2022-07-01 RX ORDER — MONTELUKAST SODIUM 10 MG/1
10 TABLET ORAL NIGHTLY
Qty: 90 TABLET | Refills: 0 | Status: SHIPPED | OUTPATIENT
Start: 2022-07-01 | End: 2022-09-30 | Stop reason: SDUPTHER

## 2022-07-01 NOTE — TELEPHONE ENCOUNTER
Please contact patient and ask if she has previously received prescription for Plavix from her cardiologist, Dr. Hancock?  Assuming cardiology previously prescribed this, please have her contact their office for prescription refill.  I do not have a problem refilling the other 2 prescriptions.

## 2022-07-05 DIAGNOSIS — I10 PRIMARY HYPERTENSION: ICD-10-CM

## 2022-07-05 DIAGNOSIS — I10 PRIMARY HYPERTENSION: Primary | ICD-10-CM

## 2022-07-05 RX ORDER — CLOPIDOGREL BISULFATE 75 MG/1
75 TABLET ORAL DAILY
Qty: 30 TABLET | Refills: 2 | Status: SHIPPED | OUTPATIENT
Start: 2022-07-05 | End: 2022-07-05

## 2022-07-05 RX ORDER — CLOPIDOGREL BISULFATE 75 MG/1
75 TABLET ORAL DAILY
Qty: 90 TABLET | Refills: 0 | Status: SHIPPED | OUTPATIENT
Start: 2022-07-05 | End: 2022-09-30 | Stop reason: SDUPTHER

## 2022-07-05 RX ORDER — TRIAMTERENE AND HYDROCHLOROTHIAZIDE 75; 50 MG/1; MG/1
1 TABLET ORAL DAILY
Qty: 30 TABLET | Refills: 2 | Status: SHIPPED | OUTPATIENT
Start: 2022-07-05 | End: 2022-07-05

## 2022-07-05 RX ORDER — TRIAMTERENE AND HYDROCHLOROTHIAZIDE 75; 50 MG/1; MG/1
1 TABLET ORAL DAILY
Qty: 90 TABLET | Refills: 0 | Status: SHIPPED | OUTPATIENT
Start: 2022-07-05 | End: 2022-10-13 | Stop reason: SDUPTHER

## 2022-07-05 NOTE — TELEPHONE ENCOUNTER
Lula stated that a doctor in  [place] prescribed her the Plavix 75 MG. She is out and needs this sent to [facility] asap, please.

## 2022-07-05 NOTE — TELEPHONE ENCOUNTER
I sent the info of plavix to be addressed in separate message. I also see you have not sent this med in.

## 2022-07-05 NOTE — TELEPHONE ENCOUNTER
Caller: Lula Burnett    Relationship: Self    Best call back number: [phone]    Requested Prescriptions:   Requested Prescriptions     Pending Prescriptions Disp Refills   • triamterene-hydrochlorothiazide (MAXZIDE) 75-50 MG per tablet       Sig: Take 1 tablet by mouth Daily.        Pharmacy where request should be sent: [facility] #[id number] - [place], [place] - [address] [place] [address] - [phone] [facility] [phone] FX     Additional details provided by patient: PATIENT IS OUT    Does the patient have less than a 3 day supply:  [x] Yes  [] No    Gt Scherer Rep   07/05/22 13:38 EDT

## 2022-07-05 NOTE — TELEPHONE ENCOUNTER
Lula called in and spoke to our hub and she stated that a doctor in  [place] prescribed her the Plavix 75 MG. She is out and needs this sent to [facility] asap, please.

## 2022-07-13 ENCOUNTER — HOSPITAL ENCOUNTER (OUTPATIENT)
Dept: ULTRASOUND IMAGING | Facility: HOSPITAL | Age: 72
Discharge: HOME | End: 2022-07-13

## 2022-07-13 ENCOUNTER — HOSPITAL ENCOUNTER (OUTPATIENT)
Dept: MAMMOGRAPHY | Facility: HOSPITAL | Age: 72
Discharge: HOME | End: 2022-07-13

## 2022-07-13 PROCEDURE — 76642 ULTRASOUND BREAST LIMITED: CPT

## 2022-07-13 PROCEDURE — 77065 DX MAMMO INCL CAD UNI: CPT

## 2022-07-13 PROCEDURE — G0279 TOMOSYNTHESIS, MAMMO: HCPCS

## 2022-08-01 DIAGNOSIS — Z12.31 ENCOUNTER FOR SCREENING MAMMOGRAM FOR BREAST CANCER: ICD-10-CM

## 2022-08-01 DIAGNOSIS — F41.9 ANXIETY: ICD-10-CM

## 2022-08-01 DIAGNOSIS — N95.1 MENOPAUSAL SYNDROME (HOT FLASHES): ICD-10-CM

## 2022-08-01 DIAGNOSIS — Z79.890 ENCOUNTER FOR MONITORING POSTMENOPAUSAL ESTROGEN REPLACEMENT THERAPY: ICD-10-CM

## 2022-08-01 DIAGNOSIS — Z51.81 ENCOUNTER FOR MONITORING POSTMENOPAUSAL ESTROGEN REPLACEMENT THERAPY: ICD-10-CM

## 2022-08-01 NOTE — TELEPHONE ENCOUNTER
Caller: Lula Burnett    Relationship: Self    Best call back number: [phone]    Requested Prescriptions:   Requested Prescriptions     Pending Prescriptions Disp Refills   • estrogens, conjugated, (PREMARIN) 1.25 MG tablet 30 tablet 1     Sig: Take 1 tablet by mouth Daily.   • PARoxetine (PAXIL) 20 MG tablet       Sig: Take  by mouth Daily.   • ALPRAZolam (XANAX) 0.25 MG tablet 90 tablet 1     Sig: Take 0.5 tablets by mouth 2 (Two) Times a Day.   • topiramate (TOPAMAX) 25 MG tablet       Si (Two) Times a Day.   • pantoprazole (PROTONIX) 40 MG EC tablet     • meclizine (ANTIVERT) 25 MG tablet       Sig: 3 (Three) Times a Day As Needed.   • atorvastatin (LIPITOR) 80 MG tablet 90 tablet    • Synjardy XR  MG tablet sustained-release 24 hour 30 tablet      Sig: Take 1 tablet by mouth Daily.        Pharmacy where request should be sent: [facility][id number] - [facility] [place] - [address] - [phone]  - [phone] FX     Additional details provided by patient: ASKING FOR 90 DAY SUPPLY     Does the patient have less than a 3 day supply:  [x] Yes  [] No    Gt George   22 13:16 EDT

## 2022-08-02 RX ORDER — EMPAGLIFLOZIN, METFORMIN HYDROCHLORIDE 25; 1000 MG/1; MG/1
1 TABLET, EXTENDED RELEASE ORAL DAILY
Qty: 90 TABLET | Refills: 3 | Status: SHIPPED | OUTPATIENT
Start: 2022-08-02 | End: 2023-01-24 | Stop reason: SDUPTHER

## 2022-08-02 RX ORDER — ALPRAZOLAM 0.25 MG/1
0.12 TABLET ORAL 2 TIMES DAILY
Qty: 90 TABLET | Refills: 1 | Status: SHIPPED | OUTPATIENT
Start: 2022-08-02 | End: 2023-01-24 | Stop reason: SDUPTHER

## 2022-08-02 RX ORDER — MECLIZINE HYDROCHLORIDE 25 MG/1
25 TABLET ORAL 3 TIMES DAILY PRN
Qty: 90 TABLET | Refills: 1 | Status: SHIPPED | OUTPATIENT
Start: 2022-08-02 | End: 2022-09-30 | Stop reason: SDUPTHER

## 2022-08-02 RX ORDER — TOPIRAMATE 25 MG/1
25 TABLET ORAL 2 TIMES DAILY
Qty: 180 TABLET | Refills: 3 | Status: SHIPPED | OUTPATIENT
Start: 2022-08-02 | End: 2023-01-24 | Stop reason: SDUPTHER

## 2022-08-02 RX ORDER — PAROXETINE HYDROCHLORIDE 20 MG/1
20 TABLET, FILM COATED ORAL DAILY
Qty: 90 TABLET | Refills: 3 | Status: SHIPPED | OUTPATIENT
Start: 2022-08-02 | End: 2023-01-24 | Stop reason: SDUPTHER

## 2022-08-02 RX ORDER — PANTOPRAZOLE SODIUM 40 MG/1
40 TABLET, DELAYED RELEASE ORAL DAILY
Qty: 90 TABLET | Refills: 3 | Status: SHIPPED | OUTPATIENT
Start: 2022-08-02 | End: 2023-01-24 | Stop reason: SDUPTHER

## 2022-08-02 RX ORDER — ATORVASTATIN CALCIUM 80 MG/1
80 TABLET, FILM COATED ORAL NIGHTLY
Qty: 90 TABLET | Refills: 3 | Status: SHIPPED | OUTPATIENT
Start: 2022-08-02 | End: 2022-09-21 | Stop reason: ALTCHOICE

## 2022-08-04 ENCOUNTER — TELEPHONE (OUTPATIENT)
Dept: FAMILY MEDICINE CLINIC | Facility: CLINIC | Age: 72
End: 2022-08-04

## 2022-08-04 DIAGNOSIS — U07.1 COVID-19: Primary | ICD-10-CM

## 2022-08-04 DIAGNOSIS — B97.89 SORE THROAT DUE TO VIRUS: ICD-10-CM

## 2022-08-04 DIAGNOSIS — R51.9 HEADACHE DUE TO VIRAL INFECTION: ICD-10-CM

## 2022-08-04 DIAGNOSIS — B34.9 HEADACHE DUE TO VIRAL INFECTION: ICD-10-CM

## 2022-08-04 DIAGNOSIS — R52 GENERALIZED BODY ACHES: ICD-10-CM

## 2022-08-04 DIAGNOSIS — J02.8 SORE THROAT DUE TO VIRUS: ICD-10-CM

## 2022-08-04 NOTE — ASSESSMENT & PLAN NOTE
Take 500mg acetaminophen every four hours as needed for aches/pains. Do not exceed 3000mg in 24 hours.    D/C Ibuprofen use.

## 2022-08-04 NOTE — TELEPHONE ENCOUNTER
Chief Complaint  POSITIVE COVID TEST / MED REQUEST    Patient reports general body aches, headache, sore throat.  Positive test at [facility] 8/3/22.  Symptoms began 8/3/22.    Reports EKG and blood work completed at ed. WNL per patient.  Will obtain results.  Denies chest pain, palpitations, shortness of breath.      Discussed medication, symptom management.  Will rx antiviral due to high risk patient and comorbidities.     D/C ibuprofen use due to impaired kidney function.  Take 500mg acetaminophen every four hours as needed for aches/pains. Do not exceed 3000mg in 24 hours.  Gargle with warm salt water for sore throat.  Rest and hydrate.  Return to ed/call 911 if chest pain or breathing difficulties.  Quarantine for five days from date of symptoms.  Wear mask when out in public after quarantine has finished.   For further advice, please go to www.cdc.gov    All questions answered. Patient understands.

## 2022-08-04 NOTE — TELEPHONE ENCOUNTER
Caller: Lula Burnett    Relationship to patient: Self    Best call back number: [phone]    Date of positive COVID19 test: 08/03/2022    COVID19 symptoms: LAST NIGHT- PATIENT EXPERIENCED PAIN AROUND HER HEART, TROUBLE BREATHING, PAIN ALL THROUGH HER BODY, SORE THROAT - PATIENT WENT TO THE [facility]     Date of initial quarantine: 08/04/2022    Additional information or concerns: PATIENT STATED THAT SHE WENT TO [facility] LAST NIGHT, AND TESTED POSITIVE FOR COVID. PATIENT STATED THAT THEY WANTED TO GIVE HER PAXLOVID BUT THEY DID NOT HAVE A LIST OF HER MEDICATIONS TO KNOW IF SHE COULD TAKE IT OR NOT. PATIENT WANTED TO KNOW IF DR VALENCIA WOULD SEE IF SHE IS ABLE TO TAKE IT AND IF SO, IF SHE COULD SEND A PRESCRIPTION IN FOR HER.       PATIENT STATED THAT THIS MORNING, THERE WAS BLOOD IN HER URINE. PATIENT STATED THAT SHE WAS GIVEN TYLENOL LAST NIGHT AT THE HOSPITAL AND SHE NEVER TAKES TYLENOL, SO SHE IS UNSURE IF THAT IS WHAT CAUSED IT. PATIENT STATED THAT SHE IS CONCERNED AND WOULD LIKE TO KNOW IF SHE CAN GET ANTIBIOTICS FOR THAT. PLEASE ADVISE.     What is the patients preferred pharmacy:     [facility] #[id number] - [place], [place] - [address] - [phone] [facility] [phone] FX

## 2022-08-04 NOTE — ASSESSMENT & PLAN NOTE
Anitviral medication sent to pharmacy. Take as directed.  May take 500mg acetaminophen every four hours as needed for aches/pains. Do not exceed 3000mg in 24 hours.  Gargle with warm salt water for sore throat.  Rest and hydrate.  Return to ed/call 911 if chest pain or breathing difficulties.  Quarantine for five days from date of symptoms.  Wear mask when out in public after quarantine has finished.   For further advice, please go to www.cdc.gov

## 2022-08-04 NOTE — ASSESSMENT & PLAN NOTE
Gargle with warm salt water frequently. Stay hydrated.  May take 500mg acetaminophen every four hours as needed, not to exceed 3000mg in 24 hour period.

## 2022-08-10 DIAGNOSIS — I10 HYPERTENSION, UNSPECIFIED TYPE: ICD-10-CM

## 2022-08-10 RX ORDER — DIGOXIN 250 MCG
250 TABLET ORAL
Status: CANCELLED | OUTPATIENT
Start: 2022-08-10

## 2022-08-10 RX ORDER — POTASSIUM CHLORIDE 20 MEQ/1
20 TABLET, EXTENDED RELEASE ORAL DAILY
Qty: 90 TABLET | Refills: 1 | Status: CANCELLED | OUTPATIENT
Start: 2022-08-10

## 2022-08-10 NOTE — TELEPHONE ENCOUNTER
Caller: (POSCOOTER) MARYSAEIDKAYCEE    Relationship: Emergency Contact    Best call back number: [phone]     Requested Prescriptions:   Requested Prescriptions     Pending Prescriptions Disp Refills   • digoxin (LANOXIN) 250 MCG tablet       Si tablet.   • potassium chloride (K-DUR,KLOR-CON) 20 MEQ CR tablet 90 tablet 1     Sig: Take 1 tablet by mouth Daily.        Pharmacy where request should be sent: [facility] - [address] [phone] [facility] [phone] FX     Additional details provided by patient: PATIENT IS NEEDING ALL MEDICATION SENT TO [facility] EXCEPT FOR THE POTASSIUM.    PATIENT WOULD LIKE THE potassium chloride (K-DUR,KLOR-CON) 20 MEQ CR tablet SENT TO     [facility] #[id number] - [place], [place] - [address] - [phone] PH - [phone] FX  [phone]      Does the patient have less than a 3 day supply:  [] Yes  [x] No    Gt Sanchez Rep   08/10/22 12:13 EDT

## 2022-08-11 DIAGNOSIS — I10 HYPERTENSION, UNSPECIFIED TYPE: ICD-10-CM

## 2022-08-11 RX ORDER — POTASSIUM CHLORIDE 20 MEQ/1
20 TABLET, EXTENDED RELEASE ORAL DAILY
Qty: 90 TABLET | Refills: 1 | Status: SHIPPED | OUTPATIENT
Start: 2022-08-11 | End: 2023-01-24 | Stop reason: SDUPTHER

## 2022-08-11 NOTE — TELEPHONE ENCOUNTER
Sent in the potassium. I dont see  has ever sent in dogoxin for her. Routing to md for approval or denial.

## 2022-08-11 NOTE — TELEPHONE ENCOUNTER
Triage    I do not recall her being on Digoxin when I saw her previously.  This looks like it was a historical medication, and I do not see a diagnosis that she would need digoxin for.  Can you please call and verify whether she is taking this or not before I refill?  Thanks.

## 2022-08-12 ENCOUNTER — TELEPHONE (OUTPATIENT)
Dept: CARDIOLOGY | Facility: CLINIC | Age: 72
End: 2022-08-12

## 2022-08-12 RX ORDER — DIGOXIN 250 MCG
250 TABLET ORAL
Qty: 90 TABLET | Refills: 3 | Status: SHIPPED | OUTPATIENT
Start: 2022-08-12

## 2022-08-12 NOTE — TELEPHONE ENCOUNTER
Pt son called and states that pt is out of her digoxin and needs it refilled       LOV 1/28/22  Next Appt 9/2/22  Labs 5/20/22    Already a pended order

## 2022-09-21 ENCOUNTER — OFFICE VISIT (OUTPATIENT)
Dept: FAMILY MEDICINE CLINIC | Facility: CLINIC | Age: 72
End: 2022-09-21

## 2022-09-21 VITALS
DIASTOLIC BLOOD PRESSURE: 80 MMHG | SYSTOLIC BLOOD PRESSURE: 136 MMHG | RESPIRATION RATE: 18 BRPM | HEIGHT: 62 IN | WEIGHT: 161.5 LBS | HEART RATE: 82 BPM | BODY MASS INDEX: 29.72 KG/M2 | TEMPERATURE: 97.7 F | OXYGEN SATURATION: 99 %

## 2022-09-21 DIAGNOSIS — E66.3 OVERWEIGHT (BMI 25.0-29.9): ICD-10-CM

## 2022-09-21 DIAGNOSIS — Z87.891 FORMER SMOKER: ICD-10-CM

## 2022-09-21 DIAGNOSIS — Z23 NEED FOR INFLUENZA VACCINATION: ICD-10-CM

## 2022-09-21 DIAGNOSIS — I10 PRIMARY HYPERTENSION: ICD-10-CM

## 2022-09-21 DIAGNOSIS — E11.9 TYPE 2 DIABETES MELLITUS WITHOUT COMPLICATION, WITHOUT LONG-TERM CURRENT USE OF INSULIN: Primary | ICD-10-CM

## 2022-09-21 DIAGNOSIS — E78.2 MIXED HYPERLIPIDEMIA: ICD-10-CM

## 2022-09-21 LAB
EXPIRATION DATE: NORMAL
HBA1C MFR BLD: 7 %
Lab: NORMAL

## 2022-09-21 PROCEDURE — 90662 IIV NO PRSV INCREASED AG IM: CPT | Performed by: FAMILY MEDICINE

## 2022-09-21 PROCEDURE — 83036 HEMOGLOBIN GLYCOSYLATED A1C: CPT | Performed by: FAMILY MEDICINE

## 2022-09-21 PROCEDURE — G0008 ADMIN INFLUENZA VIRUS VAC: HCPCS | Performed by: FAMILY MEDICINE

## 2022-09-21 PROCEDURE — 99214 OFFICE O/P EST MOD 30 MIN: CPT | Performed by: FAMILY MEDICINE

## 2022-09-21 PROCEDURE — 3051F HG A1C>EQUAL 7.0%<8.0%: CPT | Performed by: FAMILY MEDICINE

## 2022-09-21 RX ORDER — ROSUVASTATIN CALCIUM 20 MG/1
20 TABLET, COATED ORAL DAILY
Qty: 90 TABLET | Refills: 3 | Status: SHIPPED | OUTPATIENT
Start: 2022-09-21 | End: 2022-10-13 | Stop reason: SDUPTHER

## 2022-09-21 NOTE — PROGRESS NOTES
Chief Complaint  Diabetes and Hypertension     History of Present Illness    Lula Burnett presents today for anxiety and diabetes follow up.   Current medications include xanax, paxil, bydureon, lantus, and synjardy.  Patient does check blood sugar at home.   Average blood sugar is 135 to 140  Patient states medications are working well.   Patient had Diabetic Eye exam done within the last year by Dr. Christina.       Diabetic Foot Exam Performed and Monofilament Test Performed      Current Outpatient Medications on File Prior to Visit   Medication Sig   • Adalimumab (Humira Pen) 40 MG/0.8ML Pen-injector Kit Inject  under the skin into the appropriate area as directed Every 14 (Fourteen) Days. Dr [facility] for psoriasis   • ALPRAZolam (XANAX) 0.25 MG tablet Take 0.5 tablets by mouth 2 (Two) Times a Day.   • B Complex-C-Folic Acid (B-COMPLEX/FOLIC ACID/VITAMIN C PO) Take  by mouth.   • Calcium Carb-Cholecalciferol (CALCIUM 1000 + D PO) Take 1,200 mg by mouth Daily. D3 1000 MG   • carvedilol (COREG) 6.25 MG tablet 6.25 mg 2 (Two) Times a Day With Meals.   • cetirizine (zyrTEC) 10 MG tablet TAKE 1 TABLET BY MOUTH EVERY DAY   • cholecalciferol (VITAMIN D3) 25 MCG (1000 UT) tablet Take 2,000 Units by mouth Daily.   • clopidogrel (PLAVIX) 75 MG tablet TAKE 1 TABLET BY MOUTH DAILY   • cyclobenzaprine (FLEXERIL) 10 MG tablet Take 10 mg by mouth 3 (Three) Times a Day As Needed.   • digoxin (LANOXIN) 250 MCG tablet Take 1 tablet by mouth Daily.   • estrogens, conjugated, (PREMARIN) 1.25 MG tablet Take 1 tablet by mouth Daily.   • exenatide er (Bydureon) 2 MG pen-injector injection Inject 2 mg under the skin into the appropriate area as directed 1 (One) Time Per Week.   • fluticasone-salmeterol (Advair Diskus) 250-50 MCG/DOSE DISKUS Inhale 1 puff 2 (Two) Times a Day.   • ibuprofen (ADVIL,MOTRIN) 600 MG tablet Take 600 mg by mouth Every 6 (Six) Hours As Needed for Mild Pain .   • Insulin Glargine (LANTUS  SOLOSTAR) 100 UNIT/ML injection pen Inject 10 Units under the skin into the appropriate area as directed Every Night. Reduce by 1 unit if am glucose <80. Increase by 1 unit if >160.   • Insulin Pen Needle (B-D UF III MINI PEN NEEDLES) 31G X 5 MM misc 1 Piece Daily.   • levothyroxine (SYNTHROID, LEVOTHROID) 50 MCG tablet Take 1 tablet by mouth Daily.   • losartan (COZAAR) 50 MG tablet Take 1 tablet by mouth Daily.   • meclizine (ANTIVERT) 25 MG tablet Take 1 tablet by mouth 3 (Three) Times a Day As Needed for Dizziness.   • Misc Natural Products (Leg Vein & Circulation) tablet Take 360 mg by mouth Daily.   • montelukast (SINGULAIR) 10 MG tablet TAKE 1 TABLET BY MOUTH EVERY NIGHT   • Multiple Vitamins-Minerals (CENTRUM FRESH/FRUITY 50+ PO) Take  by mouth.   • nitroglycerin (NITROSTAT) 0.4 MG SL tablet 1 under the tongue as needed for angina, may repeat q5mins for up three doses   • ondansetron ODT (ZOFRAN-ODT) 8 MG disintegrating tablet Place 8 mg on the tongue Every 8 (Eight) Hours As Needed.   • pantoprazole (PROTONIX) 40 MG EC tablet Take 1 tablet by mouth Daily.   • PARoxetine (PAXIL) 20 MG tablet Take 1 tablet by mouth Daily.   • potassium chloride (K-DUR,KLOR-CON) 20 MEQ CR tablet Take 1 tablet by mouth Daily.   • ProAir  (90 Base) MCG/ACT inhaler Inhale 2 puffs Every 4 (Four) Hours As Needed.   • Synjardy XR  MG tablet sustained-release 24 hour Take 1 tablet by mouth Daily.   • topiramate (TOPAMAX) 25 MG tablet Take 1 tablet by mouth 2 (Two) Times a Day.   • triamterene-hydrochlorothiazide (MAXZIDE) 75-50 MG per tablet TAKE 1 TABLET BY MOUTH DAILY   • Turmeric 500 MG capsule Take  by mouth.   • [DISCONTINUED] atorvastatin (LIPITOR) 80 MG tablet Take 1 tablet by mouth Every Night.     No current facility-administered medications on file prior to visit.       Objective   Vital Signs:   /80 (BP Location: Right arm, Patient Position: Sitting, Cuff Size: Adult)   Pulse 82   Temp 97.7 °F (36.5  "°C) (Temporal)   Resp 18   Ht 157.5 cm (62\")   Wt 73.3 kg (161 lb 8 oz)   SpO2 99% Comment: room air  BMI 29.54 kg/m²       Physical Exam  Vitals and nursing note reviewed.   Constitutional:       General: She is not in acute distress.     Appearance: She is well-developed.   HENT:      Head: Normocephalic and atraumatic.      Right Ear: External ear normal.      Left Ear: External ear normal.   Eyes:      Conjunctiva/sclera: Conjunctivae normal.      Pupils: Pupils are equal, round, and reactive to light.   Neck:      Thyroid: No thyromegaly.      Trachea: No tracheal deviation.   Cardiovascular:      Rate and Rhythm: Normal rate and regular rhythm.      Heart sounds: No murmur heard.    No friction rub. No gallop.   Pulmonary:      Effort: Pulmonary effort is normal.      Breath sounds: Normal breath sounds. No wheezing or rales.   Abdominal:      General: Bowel sounds are normal.      Palpations: Abdomen is soft.      Tenderness: There is no abdominal tenderness.   Musculoskeletal:         General: Normal range of motion.      Cervical back: Normal range of motion and neck supple.   Feet:      Right foot:      Protective Sensation: 10 sites tested. 10 sites sensed.      Skin integrity: Dry skin present.      Toenail Condition: Right toenails are abnormally thick.      Left foot:      Protective Sensation: 10 sites tested. 10 sites sensed.      Skin integrity: Dry skin present.      Toenail Condition: Left toenails are abnormally thick.   Lymphadenopathy:      Cervical: No cervical adenopathy.   Skin:     General: Skin is warm and dry.      Findings: No rash.   Neurological:      Mental Status: She is alert and oriented to person, place, and time.   Psychiatric:         Behavior: Behavior normal.            Office Visit on 09/21/2022   Component Date Value Ref Range Status   • Hemoglobin A1C 09/21/2022 7.0  % Final   • Lot Number 09/21/2022 [id number]   Final   • Expiration Date 09/21/2022 08/31/2024   Final "             Lab Results   Component Value Date    HGBA1C 7.0 09/21/2022    HGBA1C 7.7 (H) 05/20/2022    HGBA1C 7.0 (H) 02/04/2022                Assessment and Plan    Diagnoses and all orders for this visit:    1. Type 2 diabetes mellitus without complication, without long-term current use of insulin (HCC) (Primary)  -     POC Glycosylated Hemoglobin (Hb A1C)    2. Primary hypertension    3. Need for influenza vaccination  -     Fluzone High-Dose 65+yrs    4. Overweight (BMI 25.0-29.9)  Assessment & Plan:  Patient's (Body mass index is 29.54 kg/m².) indicates that they are overweight with health conditions that include hypertension, diabetes mellitus, dyslipidemias and GERD . Weight is unchanged. BMI is is above average; BMI management plan is completed. We discussed portion control and increasing exercise.       5. Former smoker    6. Mixed hyperlipidemia  -     rosuvastatin (Crestor) 20 MG tablet; Take 1 tablet by mouth Daily.  Dispense: 90 tablet; Refill: 3      Patient is worried about a couple medications, has been afraid of atorvastatin since it was started reduced from 80 to 40 mg.  Additionally she is afraid of metformin.  After discussions about benefits and risks of medications we are changing atorvastatin to Crestor but she agrees to stay with Synjardy.  Did offer to change to Jardiance and a lower dose of metformin but she declines.      will order labs for diabetes and chol, hep c and CMP etc to do on 12/21 or after at time of MWV    Flu shot today, will discuss other vaccinations at wellness visit    Medications Discontinued During This Encounter   Medication Reason   • atorvastatin (LIPITOR) 80 MG tablet Alternate therapy         Follow Up     Return in 1 month (on 10/24/2022) for Medicare Wellness, as previously scheduled.    Patient was given instructions and counseling regarding her condition or for health maintenance advice. Please see specific information pulled into the AVS if appropriate.

## 2022-09-21 NOTE — ASSESSMENT & PLAN NOTE
Patient's (Body mass index is 29.54 kg/m².) indicates that they are overweight with health conditions that include hypertension, diabetes mellitus, dyslipidemias and GERD . Weight is unchanged. BMI is is above average; BMI management plan is completed. We discussed portion control and increasing exercise.

## 2022-09-30 DIAGNOSIS — E66.3 OVERWEIGHT (BMI 25.0-29.9): Primary | ICD-10-CM

## 2022-09-30 DIAGNOSIS — I10 PRIMARY HYPERTENSION: ICD-10-CM

## 2022-09-30 DIAGNOSIS — E78.2 MIXED HYPERLIPIDEMIA: ICD-10-CM

## 2022-09-30 DIAGNOSIS — E11.9 TYPE 2 DIABETES MELLITUS WITHOUT COMPLICATION, WITHOUT LONG-TERM CURRENT USE OF INSULIN: ICD-10-CM

## 2022-09-30 RX ORDER — MONTELUKAST SODIUM 10 MG/1
10 TABLET ORAL NIGHTLY
Qty: 90 TABLET | Refills: 0 | Status: SHIPPED | OUTPATIENT
Start: 2022-09-30 | End: 2023-01-03

## 2022-09-30 RX ORDER — MECLIZINE HYDROCHLORIDE 25 MG/1
25 TABLET ORAL 3 TIMES DAILY PRN
Qty: 90 TABLET | Refills: 1 | Status: SHIPPED | OUTPATIENT
Start: 2022-09-30 | End: 2023-01-24

## 2022-09-30 RX ORDER — ONDANSETRON 8 MG/1
8 TABLET, ORALLY DISINTEGRATING ORAL EVERY 8 HOURS PRN
Qty: 90 TABLET | Refills: 0 | Status: SHIPPED | OUTPATIENT
Start: 2022-09-30 | End: 2023-02-10

## 2022-09-30 RX ORDER — CARVEDILOL 6.25 MG/1
6.25 TABLET ORAL 2 TIMES DAILY WITH MEALS
Qty: 90 TABLET | Refills: 1 | Status: SHIPPED | OUTPATIENT
Start: 2022-09-30 | End: 2022-10-19 | Stop reason: SDUPTHER

## 2022-09-30 RX ORDER — CLOPIDOGREL BISULFATE 75 MG/1
75 TABLET ORAL DAILY
Qty: 90 TABLET | Refills: 0 | Status: SHIPPED | OUTPATIENT
Start: 2022-09-30 | End: 2022-10-13 | Stop reason: SDUPTHER

## 2022-09-30 RX ORDER — CETIRIZINE HYDROCHLORIDE 10 MG/1
10 TABLET ORAL DAILY
Qty: 90 TABLET | Refills: 1 | Status: SHIPPED | OUTPATIENT
Start: 2022-09-30 | End: 2023-03-28

## 2022-10-13 ENCOUNTER — TELEPHONE (OUTPATIENT)
Dept: FAMILY MEDICINE CLINIC | Facility: CLINIC | Age: 72
End: 2022-10-13

## 2022-10-13 DIAGNOSIS — I10 PRIMARY HYPERTENSION: ICD-10-CM

## 2022-10-13 DIAGNOSIS — E78.2 MIXED HYPERLIPIDEMIA: ICD-10-CM

## 2022-10-13 RX ORDER — TRIAMTERENE AND HYDROCHLOROTHIAZIDE 75; 50 MG/1; MG/1
1 TABLET ORAL DAILY
Qty: 30 TABLET | Refills: 0 | Status: SHIPPED | OUTPATIENT
Start: 2022-10-13 | End: 2022-11-22

## 2022-10-13 RX ORDER — ROSUVASTATIN CALCIUM 20 MG/1
20 TABLET, COATED ORAL DAILY
Qty: 30 TABLET | Refills: 0 | Status: SHIPPED | OUTPATIENT
Start: 2022-10-13

## 2022-10-13 RX ORDER — CLOPIDOGREL BISULFATE 75 MG/1
75 TABLET ORAL DAILY
Qty: 30 TABLET | Refills: 0 | Status: SHIPPED | OUTPATIENT
Start: 2022-10-13 | End: 2022-10-17

## 2022-10-13 NOTE — TELEPHONE ENCOUNTER
Sent in 30 days as if we was to sent in the full 90 day script then the mail order pharmacy would not send her the rest of the script

## 2022-10-13 NOTE — TELEPHONE ENCOUNTER
Caller: Lula Burnett    Relationship: Self    Requested Prescriptions:   Requested Prescriptions     Pending Prescriptions Disp Refills   • clopidogrel (PLAVIX) 75 MG tablet 90 tablet 0     Sig: Take 1 tablet by mouth Daily.   • triamterene-hydrochlorothiazide (MAXZIDE) 75-50 MG per tablet 90 tablet 0     Sig: Take 1 tablet by mouth Daily.   • rosuvastatin (Crestor) 20 MG tablet 90 tablet 3     Sig: Take 1 tablet by mouth Daily.        Pharmacy where request should be sent: [id number] - [place], [place] - [address] [facility] [address] - [phone]  - [phone] FX     Additional details provided by patient: PATIENT ONLY HAS 2 PILLS LEFT OF EACH AS SHE HAS BEEN WAITING ON [facility] TO SEND THEM TO HER.     Does the patient have less than a 3 day supply:  [x] Yes  [] No    Gt Nueñz Rep   10/13/22 12:26 EDT

## 2022-10-15 DIAGNOSIS — I10 PRIMARY HYPERTENSION: ICD-10-CM

## 2022-10-17 RX ORDER — CLOPIDOGREL BISULFATE 75 MG/1
75 TABLET ORAL DAILY
Qty: 90 TABLET | Refills: 0 | Status: SHIPPED | OUTPATIENT
Start: 2022-10-17 | End: 2022-12-30

## 2022-10-19 DIAGNOSIS — I10 PRIMARY HYPERTENSION: ICD-10-CM

## 2022-10-19 RX ORDER — CYCLOBENZAPRINE HCL 10 MG
10 TABLET ORAL 3 TIMES DAILY PRN
Qty: 90 TABLET | Refills: 0 | Status: SHIPPED | OUTPATIENT
Start: 2022-10-19 | End: 2023-01-24 | Stop reason: SDUPTHER

## 2022-10-19 RX ORDER — CARVEDILOL 6.25 MG/1
6.25 TABLET ORAL 2 TIMES DAILY WITH MEALS
Qty: 90 TABLET | Refills: 1 | Status: SHIPPED | OUTPATIENT
Start: 2022-10-19 | End: 2023-01-24 | Stop reason: SDUPTHER

## 2022-11-14 DIAGNOSIS — E11.9 TYPE 2 DIABETES MELLITUS WITHOUT COMPLICATION, WITHOUT LONG-TERM CURRENT USE OF INSULIN: ICD-10-CM

## 2022-11-14 RX ORDER — INSULIN GLARGINE 100 [IU]/ML
INJECTION, SOLUTION SUBCUTANEOUS
Qty: 15 ML | Refills: 2 | Status: SHIPPED | OUTPATIENT
Start: 2022-11-14 | End: 2023-01-24 | Stop reason: SDUPTHER

## 2022-11-22 DIAGNOSIS — I10 PRIMARY HYPERTENSION: ICD-10-CM

## 2022-11-22 RX ORDER — TRIAMTERENE AND HYDROCHLOROTHIAZIDE 75; 50 MG/1; MG/1
1 TABLET ORAL DAILY
Qty: 30 TABLET | Refills: 0 | Status: SHIPPED | OUTPATIENT
Start: 2022-11-22 | End: 2022-11-22

## 2022-11-22 RX ORDER — TRIAMTERENE AND HYDROCHLOROTHIAZIDE 75; 50 MG/1; MG/1
1 TABLET ORAL DAILY
Qty: 90 TABLET | Refills: 0 | Status: SHIPPED | OUTPATIENT
Start: 2022-11-22 | End: 2023-01-24 | Stop reason: SDUPTHER

## 2022-11-30 RX ORDER — MECLIZINE HYDROCHLORIDE 25 MG/1
25 TABLET ORAL 3 TIMES DAILY PRN
Qty: 90 TABLET | Refills: 1 | OUTPATIENT
Start: 2022-11-30

## 2022-12-29 DIAGNOSIS — I10 PRIMARY HYPERTENSION: ICD-10-CM

## 2022-12-30 RX ORDER — CLOPIDOGREL BISULFATE 75 MG/1
TABLET ORAL
Qty: 90 TABLET | Refills: 1 | Status: SHIPPED | OUTPATIENT
Start: 2022-12-30 | End: 2023-01-24 | Stop reason: SDUPTHER

## 2023-01-03 RX ORDER — MONTELUKAST SODIUM 10 MG/1
TABLET ORAL
Qty: 90 TABLET | Refills: 3 | Status: SHIPPED | OUTPATIENT
Start: 2023-01-03 | End: 2023-01-24 | Stop reason: SDUPTHER

## 2023-01-24 ENCOUNTER — OFFICE VISIT (OUTPATIENT)
Dept: FAMILY MEDICINE CLINIC | Facility: CLINIC | Age: 73
End: 2023-01-24
Payer: MEDICARE

## 2023-01-24 VITALS
HEART RATE: 123 BPM | SYSTOLIC BLOOD PRESSURE: 138 MMHG | WEIGHT: 169.5 LBS | OXYGEN SATURATION: 97 % | HEIGHT: 63 IN | DIASTOLIC BLOOD PRESSURE: 70 MMHG | BODY MASS INDEX: 30.03 KG/M2 | TEMPERATURE: 97.2 F | RESPIRATION RATE: 18 BRPM

## 2023-01-24 DIAGNOSIS — Z79.899 HIGH RISK MEDICATION USE: ICD-10-CM

## 2023-01-24 DIAGNOSIS — G43.809 OTHER MIGRAINE WITHOUT STATUS MIGRAINOSUS, NOT INTRACTABLE: ICD-10-CM

## 2023-01-24 DIAGNOSIS — Z79.890 ENCOUNTER FOR MONITORING POSTMENOPAUSAL ESTROGEN REPLACEMENT THERAPY: ICD-10-CM

## 2023-01-24 DIAGNOSIS — E03.9 ACQUIRED HYPOTHYROIDISM: ICD-10-CM

## 2023-01-24 DIAGNOSIS — E78.2 MIXED HYPERLIPIDEMIA: ICD-10-CM

## 2023-01-24 DIAGNOSIS — K21.9 GASTROESOPHAGEAL REFLUX DISEASE, UNSPECIFIED WHETHER ESOPHAGITIS PRESENT: ICD-10-CM

## 2023-01-24 DIAGNOSIS — E11.9 TYPE 2 DIABETES MELLITUS WITHOUT COMPLICATION, WITHOUT LONG-TERM CURRENT USE OF INSULIN: ICD-10-CM

## 2023-01-24 DIAGNOSIS — Z00.00 MEDICARE ANNUAL WELLNESS VISIT, SUBSEQUENT: Primary | ICD-10-CM

## 2023-01-24 DIAGNOSIS — I63.9 CEREBROVASCULAR ACCIDENT (CVA), UNSPECIFIED MECHANISM: ICD-10-CM

## 2023-01-24 DIAGNOSIS — I10 ESSENTIAL HYPERTENSION: ICD-10-CM

## 2023-01-24 DIAGNOSIS — Z51.81 ENCOUNTER FOR MONITORING POSTMENOPAUSAL ESTROGEN REPLACEMENT THERAPY: ICD-10-CM

## 2023-01-24 DIAGNOSIS — Z78.0 MENOPAUSE: ICD-10-CM

## 2023-01-24 DIAGNOSIS — L40.9 PSORIASIS: ICD-10-CM

## 2023-01-24 DIAGNOSIS — I10 PRIMARY HYPERTENSION: ICD-10-CM

## 2023-01-24 DIAGNOSIS — F41.9 ANXIETY: ICD-10-CM

## 2023-01-24 DIAGNOSIS — R92.8 ABNORMAL MAMMOGRAM OF RIGHT BREAST: ICD-10-CM

## 2023-01-24 DIAGNOSIS — N95.1 MENOPAUSAL SYNDROME (HOT FLASHES): ICD-10-CM

## 2023-01-24 DIAGNOSIS — I10 HYPERTENSION, UNSPECIFIED TYPE: ICD-10-CM

## 2023-01-24 DIAGNOSIS — Z12.11 COLON CANCER SCREENING: ICD-10-CM

## 2023-01-24 DIAGNOSIS — Z12.31 ENCOUNTER FOR SCREENING MAMMOGRAM FOR BREAST CANCER: ICD-10-CM

## 2023-01-24 DIAGNOSIS — F32.5 MAJOR DEPRESSIVE DISORDER IN FULL REMISSION, UNSPECIFIED WHETHER RECURRENT: ICD-10-CM

## 2023-01-24 DIAGNOSIS — M62.838 MUSCLE SPASM: ICD-10-CM

## 2023-01-24 DIAGNOSIS — J45.909 ASTHMA, UNSPECIFIED ASTHMA SEVERITY, UNSPECIFIED WHETHER COMPLICATED, UNSPECIFIED WHETHER PERSISTENT: ICD-10-CM

## 2023-01-24 PROCEDURE — G0439 PPPS, SUBSEQ VISIT: HCPCS | Performed by: FAMILY MEDICINE

## 2023-01-24 PROCEDURE — 1159F MED LIST DOCD IN RCRD: CPT | Performed by: FAMILY MEDICINE

## 2023-01-24 PROCEDURE — 99214 OFFICE O/P EST MOD 30 MIN: CPT | Performed by: FAMILY MEDICINE

## 2023-01-24 RX ORDER — PANTOPRAZOLE SODIUM 40 MG/1
40 TABLET, DELAYED RELEASE ORAL DAILY
Qty: 90 TABLET | Refills: 3 | Status: SHIPPED | OUTPATIENT
Start: 2023-01-24

## 2023-01-24 RX ORDER — TOPIRAMATE 25 MG/1
25 TABLET ORAL 2 TIMES DAILY
Qty: 180 TABLET | Refills: 3 | Status: SHIPPED | OUTPATIENT
Start: 2023-01-24

## 2023-01-24 RX ORDER — MECLIZINE HYDROCHLORIDE 25 MG/1
25 TABLET ORAL 3 TIMES DAILY PRN
Qty: 90 TABLET | Refills: 1 | Status: SHIPPED | OUTPATIENT
Start: 2023-01-24

## 2023-01-24 RX ORDER — MONTELUKAST SODIUM 10 MG/1
10 TABLET ORAL NIGHTLY
Qty: 90 TABLET | Refills: 3 | Status: SHIPPED | OUTPATIENT
Start: 2023-01-24

## 2023-01-24 RX ORDER — INSULIN GLARGINE 100 [IU]/ML
INJECTION, SOLUTION SUBCUTANEOUS
Qty: 15 ML | Refills: 2 | Status: SHIPPED | OUTPATIENT
Start: 2023-01-24

## 2023-01-24 RX ORDER — ADALIMUMAB 40MG/0.8ML
40 KIT SUBCUTANEOUS
Qty: 4 EACH | Refills: 0
Start: 2023-01-24

## 2023-01-24 RX ORDER — PAROXETINE HYDROCHLORIDE 20 MG/1
20 TABLET, FILM COATED ORAL DAILY
Qty: 90 TABLET | Refills: 3 | Status: SHIPPED | OUTPATIENT
Start: 2023-01-24

## 2023-01-24 RX ORDER — POTASSIUM CHLORIDE 20 MEQ/1
20 TABLET, EXTENDED RELEASE ORAL DAILY
Qty: 90 TABLET | Refills: 1 | Status: SHIPPED | OUTPATIENT
Start: 2023-01-24

## 2023-01-24 RX ORDER — CYCLOBENZAPRINE HCL 10 MG
10 TABLET ORAL 3 TIMES DAILY PRN
Qty: 90 TABLET | Refills: 1 | Status: SHIPPED | OUTPATIENT
Start: 2023-01-24

## 2023-01-24 RX ORDER — FLUTICASONE PROPIONATE AND SALMETEROL 250; 50 UG/1; UG/1
1 POWDER RESPIRATORY (INHALATION)
Qty: 180 EACH | Refills: 3 | Status: SHIPPED | OUTPATIENT
Start: 2023-01-24

## 2023-01-24 RX ORDER — CARVEDILOL 6.25 MG/1
6.25 TABLET ORAL 2 TIMES DAILY WITH MEALS
Qty: 90 TABLET | Refills: 1 | Status: SHIPPED | OUTPATIENT
Start: 2023-01-24

## 2023-01-24 RX ORDER — ATORVASTATIN CALCIUM 80 MG/1
80 TABLET, FILM COATED ORAL DAILY
COMMUNITY
End: 2023-01-25 | Stop reason: ALTCHOICE

## 2023-01-24 RX ORDER — TRIAMTERENE AND HYDROCHLOROTHIAZIDE 75; 50 MG/1; MG/1
1 TABLET ORAL DAILY
Qty: 90 TABLET | Refills: 0 | Status: SHIPPED | OUTPATIENT
Start: 2023-01-24 | End: 2023-04-07

## 2023-01-24 RX ORDER — EMPAGLIFLOZIN, METFORMIN HYDROCHLORIDE 25; 1000 MG/1; MG/1
1 TABLET, EXTENDED RELEASE ORAL DAILY
Qty: 90 TABLET | Refills: 3 | Status: SHIPPED | OUTPATIENT
Start: 2023-01-24

## 2023-01-24 RX ORDER — CLOPIDOGREL BISULFATE 75 MG/1
75 TABLET ORAL DAILY
Qty: 90 TABLET | Refills: 1 | Status: SHIPPED | OUTPATIENT
Start: 2023-01-24

## 2023-01-24 RX ORDER — LEVOTHYROXINE SODIUM 0.05 MG/1
50 TABLET ORAL DAILY
Qty: 90 TABLET | Refills: 3 | Status: SHIPPED | OUTPATIENT
Start: 2023-01-24

## 2023-01-24 RX ORDER — ALPRAZOLAM 0.25 MG/1
0.12 TABLET ORAL 2 TIMES DAILY
Qty: 90 TABLET | Refills: 1 | Status: SHIPPED | OUTPATIENT
Start: 2023-01-24

## 2023-01-24 NOTE — PROGRESS NOTES
The ABCs of the Annual Wellness Visit  Subsequent Medicare Wellness Visit    Subjective    Lula Burnett is a 72 y.o. female who presents for a Subsequent Medicare Wellness Visit.    The following portions of the patient's history were reviewed and   updated as appropriate: allergies, current medications, past family history, past medical history, past social history, past surgical history and problem list.    Compared to one year ago, the patient feels her physical   health is better.    Compared to one year ago, the patient feels her mental   health is the same.    Recent Hospitalizations:  She was admitted within the past 365 days at [facility] for unstable angina on 2/7/2022.  She has also been seen at [facility].     Current Medical Providers:  Patient Care Team:  Val Irvin MD as PCP - General (Family Medicine)    Outpatient Medications Prior to Visit   Medication Sig Dispense Refill   • atorvastatin (LIPITOR) 80 MG tablet Take 80 mg by mouth Daily.     • B Complex-C-Folic Acid (B-COMPLEX/FOLIC ACID/VITAMIN C PO) Take  by mouth.     • Calcium Carb-Cholecalciferol (CALCIUM 1000 + D PO) Take 1,200 mg by mouth Daily. D3 1000 MG     • cetirizine (zyrTEC) 10 MG tablet Take 1 tablet by mouth Daily. 90 tablet 1   • cholecalciferol (VITAMIN D3) 25 MCG (1000 UT) tablet Take 2,000 Units by mouth Daily.     • digoxin (LANOXIN) 250 MCG tablet Take 1 tablet by mouth Daily. 90 tablet 3   • estrogens, conjugated, (PREMARIN) 1.25 MG tablet Take 1 tablet by mouth Daily. 90 tablet 3   • ibuprofen (ADVIL,MOTRIN) 600 MG tablet Take 600 mg by mouth Every 6 (Six) Hours As Needed for Mild Pain .     • Insulin Pen Needle (B-D UF III MINI PEN NEEDLES) 31G X 5 MM misc 1 Piece Daily. 100 each 3   • Misc Natural Products (Leg Vein & Circulation) tablet Take 360 mg by mouth Daily.     • Multiple Vitamins-Minerals (CENTRUM FRESH/FRUITY 50+ PO) Take  by mouth.     • nitroglycerin (NITROSTAT)  0.4 MG SL tablet 1 under the tongue as needed for angina, may repeat q5mins for up three doses 20 tablet 1   • ondansetron ODT (ZOFRAN-ODT) 8 MG disintegrating tablet Place 1 tablet on the tongue Every 8 (Eight) Hours As Needed for Nausea or Vomiting. 90 tablet 0   • Turmeric 500 MG capsule Take  by mouth.     • Adalimumab (Humira Pen) 40 MG/0.8ML Pen-injector Kit Inject  under the skin into the appropriate area as directed Every 14 (Fourteen) Days. [facility] for psoriasis 4 each 12   • ALPRAZolam (XANAX) 0.25 MG tablet Take 0.5 tablets by mouth 2 (Two) Times a Day. 90 tablet 1   • carvedilol (COREG) 6.25 MG tablet Take 1 tablet by mouth 2 (Two) Times a Day With Meals. 90 tablet 1   • clopidogrel (PLAVIX) 75 MG tablet TAKE 1 TABLET DAILY 90 tablet 1   • cyclobenzaprine (FLEXERIL) 10 MG tablet Take 1 tablet by mouth 3 (Three) Times a Day As Needed for Muscle Spasms. 90 tablet 0   • fluticasone-salmeterol (Advair Diskus) 250-50 MCG/DOSE DISKUS Inhale 1 puff 2 (Two) Times a Day. 180 each 3   • Lantus SoloStar 100 UNIT/ML injection pen USE AS INSTRUCTED BY YOUR PRESCRIBER 15 mL 2   • levothyroxine (SYNTHROID, LEVOTHROID) 50 MCG tablet Take 1 tablet by mouth Daily. 90 tablet 3   • montelukast (SINGULAIR) 10 MG tablet TAKE 1 TABLET EVERY NIGHT 90 tablet 3   • pantoprazole (PROTONIX) 40 MG EC tablet Take 1 tablet by mouth Daily. 90 tablet 3   • PARoxetine (PAXIL) 20 MG tablet Take 1 tablet by mouth Daily. 90 tablet 3   • potassium chloride (K-DUR,KLOR-CON) 20 MEQ CR tablet Take 1 tablet by mouth Daily. 90 tablet 1   • ProAir  (90 Base) MCG/ACT inhaler Inhale 2 puffs Every 4 (Four) Hours As Needed for Wheezing or Shortness of Air. 18 g 3   • Synjardy XR  MG tablet sustained-release 24 hour Take 1 tablet by mouth Daily. 90 tablet 3   • topiramate (TOPAMAX) 25 MG tablet Take 1 tablet by mouth 2 (Two) Times a Day. 180 tablet 3   • triamterene-hydrochlorothiazide (MAXZIDE) 75-50 MG per tablet  "TAKE 1 TABLET BY MOUTH DAILY 90 tablet 0   • rosuvastatin (Crestor) 20 MG tablet Take 1 tablet by mouth Daily. 30 tablet 0   • exenatide er (Bydureon) 2 MG pen-injector injection Inject 2 mg under the skin into the appropriate area as directed 1 (One) Time Per Week.     • losartan (COZAAR) 50 MG tablet Take 1 tablet by mouth Daily. 90 tablet 3   • meclizine (ANTIVERT) 25 MG tablet Take 1 tablet by mouth 3 (Three) Times a Day As Needed for Dizziness. 90 tablet 1     No facility-administered medications prior to visit.       No opioid medication identified on active medication list. I have reviewed chart for other potential  high risk medication/s and harmful drug interactions in the elderly.          Aspirin is not on active medication list.  Aspirin use is not indicated based on review of current medical condition/s. Risk of harm outweighs potential benefits.  .    Patient Active Problem List   Diagnosis   • Diabetes (HCC)   • Diabetic neuropathy (HCC)   • CVA (cerebral vascular accident) (HCC)   • GERD (gastroesophageal reflux disease)   • Hypertension   • Hypothyroidism   • Mitral stenosis   • Mixed hyperlipidemia   • Mitral valve annular calcification   • Anxiety   • Depression   • Psoriasis   • Unstable angina (HCC)   • COVID-19   • Sore throat due to virus   • Generalized body aches   • Headache due to viral infection   • Overweight (BMI 25.0-29.9)   • Former smoker   • Medicare annual wellness visit, subsequent     Advance Care Planning  Advance Directive is on file.  ACP discussion was held with the patient during this visit. Patient has an advance directive in EMR which is still valid.      Objective    Vitals:    01/24/23 1326   BP: 138/70   BP Location: Right arm   Patient Position: Sitting   Cuff Size: Adult   Pulse: (!) 123   Resp: 18   Temp: 97.2 °F (36.2 °C)   TempSrc: Temporal   SpO2: 97%   Weight: 76.9 kg (169 lb 8 oz)   Height: 158.8 cm (62.5\")     Estimated body mass index is 30.51 kg/m² as " "calculated from the following:    Height as of this encounter: 158.8 cm (62.5\").    Weight as of this encounter: 76.9 kg (169 lb 8 oz).    BMI is >= 25 and <30. (Overweight) The following options were offered after discussion;: exercise counseling/recommendations and nutrition counseling/recommendations    ATTENTION  What is the year: correct  What is the month of the year: correct  What is the day of the week?: correct  What is the date?: correct  MEMORY  Repeat address three times, only score third attempt: Aleksandr Lord [address] [place] [address]: 7  HOW MANY ANIMALS DID THE PATIENT NAME  Verbal Fluency -- Animal Names (0-25): 17-21  CLOCK DRAWING  Clock Drawing: All Correct  MEMORY RECALL  Tell me what you remember about that name and address we were repeating at the beginnin  ACE TOTAL SCORE  Total ACE Score - <25/30 strongly suggests cognitive impairment; <21/30 almost certainly shows dementia: 27      Does the patient have evidence of cognitive impairment? history of strokes          HEALTH RISK ASSESSMENT    Smoking Status:  Social History     Tobacco Use   Smoking Status Former   • Packs/day: 1.00   • Years: 20.00   • Pack years: 20.00   • Types: Cigarettes   • Start date:    • Quit date: 2011   • Years since quittin.9   Smokeless Tobacco Never   Tobacco Comments    Quit around      Alcohol Consumption:  Social History     Substance and Sexual Activity   Alcohol Use Never     Fall Risk Screen:    SHAR Fall Risk Assessment was completed, and patient is at LOW risk for falls.Assessment completed on:2023    Depression Screening:  PHQ-2/PHQ-9 Depression Screening 2023   Little Interest or Pleasure in Doing Things 0-->not at all   Feeling Down, Depressed or Hopeless 0-->not at all   PHQ-9: Brief Depression Severity Measure Score 0       Health Habits and Functional and Cognitive Screening:  Functional & Cognitive Status 2023   Do you have difficulty preparing food " and eating? No   Do you have difficulty bathing yourself, getting dressed or grooming yourself? No   Do you have difficulty using the toilet? No   Do you have difficulty moving around from place to place? No   Do you have trouble with steps or getting out of a bed or a chair? No   Current Diet Well Balanced Diet   Dental Exam Up to date   Eye Exam Up to date   Exercise (times per week) 7 times per week   Current Exercises Include Walking   Do you need help using the phone?  No   Are you deaf or do you have serious difficulty hearing?  No   Do you need help with transportation? No   Do you need help shopping? No   Do you need help preparing meals?  No   Do you need help with housework?  No   Do you need help with laundry? No   Do you need help taking your medications? No   Do you need help managing money? No   Do you ever drive or ride in a car without wearing a seat belt? No   Have you felt unusual stress, anger or loneliness in the last month? No   Who do you live with? Alone   If you need help, do you have trouble finding someone available to you? No   Have you been bothered in the last four weeks by sexual problems? No   Do you have difficulty concentrating, remembering or making decisions? No       Age-appropriate Screening Schedule:  Refer to the list below for future screening recommendations based on patient's age, sex and/or medical conditions. Orders for these recommended tests are listed in the plan section. The patient has been provided with a written plan.    Health Maintenance   Topic Date Due   • DXA SCAN  Never done   • ZOSTER VACCINE (1 of 2) Never done   • DIABETIC EYE EXAM  12/23/2022   • URINE MICROALBUMIN  02/04/2023   • HEMOGLOBIN A1C  03/21/2023   • LIPID PANEL  05/20/2023   • DIABETIC FOOT EXAM  09/21/2023   • MAMMOGRAM  07/13/2024   • TDAP/TD VACCINES (2 - Td or Tdap) 03/17/2026   • INFLUENZA VACCINE  Completed                CMS Preventative Services Quick Reference  Risk Factors Identified  "During Encounter  Immunizations Discussed/Encouraged: Pneumococcal 23, Prevnar 20 (Pneumococcal 20-valent conjugate), Shingrix and COVID19  The above risks/problems have been discussed with the patient.  Pertinent information has been shared with the patient in the After Visit Summary.  An After Visit Summary and PPPS were made available to the patient.    Follow Up:   Next Medicare Wellness visit to be scheduled in 1 year.       Additional E&M Note during same encounter follows:  Patient has multiple medical problems which are significant and separately identifiable that require additional work above and beyond the Medicare Wellness Visit.      Chief Complaint  Medicare Wellness-subsequent, Diabetes, and Hypertension    Subjective        HPI  Lula Burnett is also being seen today for follow-up on all chronic medical problems, requesting refills of all medications.    Objective   Vital Signs:  /70 (BP Location: Right arm, Patient Position: Sitting, Cuff Size: Adult)   Pulse (!) 123   Temp 97.2 °F (36.2 °C) (Temporal)   Resp 18   Ht 158.8 cm (62.5\")   Wt 76.9 kg (169 lb 8 oz)   SpO2 97%   BMI 30.51 kg/m²     Physical Exam  Vitals and nursing note reviewed.   Constitutional:       General: She is not in acute distress.     Appearance: She is well-developed.   HENT:      Head: Normocephalic and atraumatic.   Cardiovascular:      Rate and Rhythm: Normal rate and regular rhythm.      Heart sounds: No murmur heard.  Pulmonary:      Effort: Pulmonary effort is normal.      Breath sounds: Normal breath sounds. No wheezing.   Musculoskeletal:         General: Normal range of motion.   Skin:     General: Skin is warm and dry.      Findings: No rash.   Neurological:      Mental Status: She is alert and oriented to person, place, and time.   Psychiatric:      Comments: Patient is very talkative and exhibits decent understanding of her medical conditions she is somewhat tangential and interruptive medical " encounter                         Assessment and Plan   Diagnoses and all orders for this visit:    1. Medicare annual wellness visit, subsequent (Primary)    2. Psoriasis  Comments:  [facility]  Orders:  -     Adalimumab (Humira Pen) 40 MG/0.8ML Pen-injector Kit; Inject 40 mg under the skin into the appropriate area as directed Every 14 (Fourteen) Days.  Dispense: 4 each; Refill: 0  -     Ambulatory Referral to Dermatology    3. Anxiety  -     ALPRAZolam (XANAX) 0.25 MG tablet; Take 0.5 tablets by mouth 2 (Two) Times a Day.  Dispense: 90 tablet; Refill: 1    4. Primary hypertension  -     clopidogrel (PLAVIX) 75 MG tablet; Take 1 tablet by mouth Daily.  Dispense: 90 tablet; Refill: 1  -     carvedilol (COREG) 6.25 MG tablet; Take 1 tablet by mouth 2 (Two) Times a Day With Meals.  Dispense: 90 tablet; Refill: 1  -     triamterene-hydrochlorothiazide (MAXZIDE) 75-50 MG per tablet; Take 1 tablet by mouth Daily.  Dispense: 90 tablet; Refill: 0    5. Menopausal syndrome (hot flashes)    6. Encounter for screening mammogram for breast cancer    7. Encounter for monitoring postmenopausal estrogen replacement therapy    8. Hypertension, unspecified type  -     potassium chloride (K-DUR,KLOR-CON) 20 MEQ CR tablet; Take 1 tablet by mouth Daily.  Dispense: 90 tablet; Refill: 1  -     Comprehensive Metabolic Panel  -     CBC & Differential  -     MicroAlbumin, Urine, Random - Urine, Clean Catch    9. Essential hypertension    10. Type 2 diabetes mellitus without complication, without long-term current use of insulin (HCC)  -     Synjardy XR  MG tablet sustained-release 24 hour; Take 1 tablet by mouth Daily.  Dispense: 90 tablet; Refill: 3  -     Insulin Glargine (Lantus SoloStar) 100 UNIT/ML injection pen; Inject 10units daily.  Dispense: 15 mL; Refill: 2  -     Hemoglobin A1c  -     Comprehensive Metabolic Panel  -     MicroAlbumin, Urine, Random - Urine, Clean Catch    11. Mixed hyperlipidemia  -      Comprehensive Metabolic Panel  -     Lipid Panel    12. Colon cancer screening  -     Cologuard - Stool, Per Rectum; Future    13. Other migraine without status migrainosus, not intractable  -     topiramate (TOPAMAX) 25 MG tablet; Take 1 tablet by mouth 2 (Two) Times a Day.  Dispense: 180 tablet; Refill: 3    14. Cerebrovascular accident (CVA), unspecified mechanism (HCC)  -     meclizine (ANTIVERT) 25 MG tablet; Take 1 tablet by mouth 3 (Three) Times a Day As Needed for Dizziness.  Dispense: 90 tablet; Refill: 1  -     Lipid Panel    15. Asthma, unspecified asthma severity, unspecified whether complicated, unspecified whether persistent  -     ProAir  (90 Base) MCG/ACT inhaler; Inhale 2 puffs Every 4 (Four) Hours As Needed for Wheezing or Shortness of Air.  Dispense: 18 g; Refill: 3  -     montelukast (SINGULAIR) 10 MG tablet; Take 1 tablet by mouth Every Night.  Dispense: 90 tablet; Refill: 3  -     Fluticasone-Salmeterol (ADVAIR/WIXELA) 250-50 MCG/ACT DISKUS; Inhale 1 puff 2 (Two) Times a Day.  Dispense: 180 each; Refill: 3    16. Acquired hypothyroidism  -     levothyroxine (SYNTHROID, LEVOTHROID) 50 MCG tablet; Take 1 tablet by mouth Daily.  Dispense: 90 tablet; Refill: 3  -     TSH  -     T4, Free  -     T3, Free    17. Major depressive disorder in full remission, unspecified whether recurrent (HCC)  -     PARoxetine (PAXIL) 20 MG tablet; Take 1 tablet by mouth Daily.  Dispense: 90 tablet; Refill: 3    18. Muscle spasm  -     cyclobenzaprine (FLEXERIL) 10 MG tablet; Take 1 tablet by mouth 3 (Three) Times a Day As Needed for Muscle Spasms.  Dispense: 90 tablet; Refill: 1    19. Gastroesophageal reflux disease, unspecified whether esophagitis present  -     pantoprazole (PROTONIX) 40 MG EC tablet; Take 1 tablet by mouth Daily.  Dispense: 90 tablet; Refill: 3  -     Magnesium    20. Abnormal mammogram of right breast  -     Mammo Diagnostic Digital Tomosynthesis Right With CAD; Future    21. Menopause  -      DEXA Bone Density Axial; Future    22. High risk medication use  -     Digoxin level    The patient was counseled regarding nutrition, physical activity, healthy weight, injury prevention, immunizations and preventative health screenings.  Patient refuses all vaccinations, does agree to DEXA scan and diagnostic mammogram.  As well as Cologuard for colorectal cancer screening.    Patient is requesting all medications be sent to mail order.  Some medications I have not yet prescribed and review all in detail to confirm accuracy and appropriateness of continuing.  Renewing as above.    Hyperlipidemia and history of stroke patient is supposed to be taking statin therapy.  There is still some confusion as to whether she started new prescription of rosuvastatin or continued old prescription of atorvastatin.  She will confirm when she gets home which bottle she has been taking and will adjust if needed based on labs as ordered today.    Patient has history of psoriasis and has been taking Humira previously prescribed by Dr Sung viera for psoriasis she does not want to return to [place], at her request I am putting in referral for new dermatologist.  Her last dose of Humira was 4 days ago renewing 1 time until she can establish with new dermatologist to prevent a lapse in therapy.    Patient has history of depression and has been treated with Paxil and Xanax low-dose twice daily for the past 23 years, will continue    Diabetes, home glucose readings reported ranging from  with a 110 average, checking A1c to confirm good diabetes control, continue current medications.    Patient has canceled 2 appointments for Medicare wellness and was expecting to have labs for today's visit.  I advised we were supposed to do a Medicare wellness in October and I would have ordered her labs at that time to follow-up at an appropriate 3-month interval.  Advised that we need more frequent appointments safely cover  complexity of her medical care  Patient voices understanding.       Follow Up   Return in about 1 week (around 1/31/2023), or if symptoms worsen or fail to improve, for Recheck.  Patient was given instructions and counseling regarding her condition or for health maintenance advice. Please see specific information pulled into the AVS if appropriate.

## 2023-01-25 ENCOUNTER — TELEPHONE (OUTPATIENT)
Dept: FAMILY MEDICINE CLINIC | Facility: CLINIC | Age: 73
End: 2023-01-25

## 2023-01-25 LAB
ALBUMIN SERPL-MCNC: 4.9 G/DL (ref 3.7–4.7)
ALBUMIN/GLOB SERPL: 1.8 {RATIO} (ref 1.2–2.2)
ALP SERPL-CCNC: 76 IU/L (ref 44–121)
ALT SERPL-CCNC: 34 IU/L (ref 0–32)
AST SERPL-CCNC: 24 IU/L (ref 0–40)
BASOPHILS # BLD AUTO: 0 X10E3/UL (ref 0–0.2)
BASOPHILS NFR BLD AUTO: 0 %
BILIRUB SERPL-MCNC: <0.2 MG/DL (ref 0–1.2)
BUN SERPL-MCNC: 26 MG/DL (ref 8–27)
BUN/CREAT SERPL: 32 (ref 12–28)
CALCIUM SERPL-MCNC: 10.2 MG/DL (ref 8.7–10.3)
CHLORIDE SERPL-SCNC: 90 MMOL/L (ref 96–106)
CHOLEST SERPL-MCNC: 288 MG/DL (ref 100–199)
CO2 SERPL-SCNC: 28 MMOL/L (ref 20–29)
CREAT SERPL-MCNC: 0.81 MG/DL (ref 0.57–1)
DIGOXIN SERPL-MCNC: <0.4 NG/ML (ref 0.5–0.9)
EGFRCR SERPLBLD CKD-EPI 2021: 76 ML/MIN/1.73
EOSINOPHIL # BLD AUTO: 0.1 X10E3/UL (ref 0–0.4)
EOSINOPHIL NFR BLD AUTO: 1 %
ERYTHROCYTE [DISTWIDTH] IN BLOOD BY AUTOMATED COUNT: 14.9 % (ref 11.7–15.4)
GLOBULIN SER CALC-MCNC: 2.8 G/DL (ref 1.5–4.5)
GLUCOSE SERPL-MCNC: 92 MG/DL (ref 70–99)
HBA1C MFR BLD: 7.2 % (ref 4.8–5.6)
HCT VFR BLD AUTO: 49.1 % (ref 34–46.6)
HDLC SERPL-MCNC: 78 MG/DL
HGB BLD-MCNC: 15.7 G/DL (ref 11.1–15.9)
IMM GRANULOCYTES # BLD AUTO: 0 X10E3/UL (ref 0–0.1)
IMM GRANULOCYTES NFR BLD AUTO: 0 %
LDLC SERPL CALC-MCNC: 135 MG/DL (ref 0–99)
LYMPHOCYTES # BLD AUTO: 5.8 X10E3/UL (ref 0.7–3.1)
LYMPHOCYTES NFR BLD AUTO: 58 %
MAGNESIUM SERPL-MCNC: 2.4 MG/DL (ref 1.6–2.3)
MCH RBC QN AUTO: 27.1 PG (ref 26.6–33)
MCHC RBC AUTO-ENTMCNC: 32 G/DL (ref 31.5–35.7)
MCV RBC AUTO: 85 FL (ref 79–97)
MICROALBUMIN UR-MCNC: 786.6 UG/ML
MONOCYTES # BLD AUTO: 0.6 X10E3/UL (ref 0.1–0.9)
MONOCYTES NFR BLD AUTO: 6 %
NEUTROPHILS # BLD AUTO: 3.5 X10E3/UL (ref 1.4–7)
NEUTROPHILS NFR BLD AUTO: 35 %
PLATELET # BLD AUTO: 387 X10E3/UL (ref 150–450)
POTASSIUM SERPL-SCNC: 4 MMOL/L (ref 3.5–5.2)
PROT SERPL-MCNC: 7.7 G/DL (ref 6–8.5)
RBC # BLD AUTO: 5.8 X10E6/UL (ref 3.77–5.28)
SODIUM SERPL-SCNC: 134 MMOL/L (ref 134–144)
T3FREE SERPL-MCNC: 2.8 PG/ML (ref 2–4.4)
T4 FREE SERPL-MCNC: 1.25 NG/DL (ref 0.82–1.77)
TRIGL SERPL-MCNC: 419 MG/DL (ref 0–149)
TSH SERPL DL<=0.005 MIU/L-ACNC: 2.8 UIU/ML (ref 0.45–4.5)
VLDLC SERPL CALC-MCNC: 75 MG/DL (ref 5–40)
WBC # BLD AUTO: 10 X10E3/UL (ref 3.4–10.8)

## 2023-01-25 NOTE — TELEPHONE ENCOUNTER
Thank you, I have confirmed Crestor 20 mg is on medication list and I have removed atorvastatin (Lipitor).  Patient was confused at appointment yesterday and forgot we had made the change at previous office visit.

## 2023-01-25 NOTE — TELEPHONE ENCOUNTER
PATIENT IS CALLING TO LET DR. FRANCIE VALENCIA KNOW THAT SHE TAKES CRESTOR.     PLEASE CALL [phone] TO DISCUSS IF NEEDED.

## 2023-02-10 RX ORDER — ONDANSETRON 8 MG/1
TABLET, ORALLY DISINTEGRATING ORAL
Qty: 30 TABLET | Refills: 0 | Status: SHIPPED | OUTPATIENT
Start: 2023-02-10

## 2023-02-10 NOTE — PROGRESS NOTES
I have sent the following message to patient through [employer] sent a message attached to a refill request back to Maggie david Dominguez of you please make sure she does not schedule for a follow-up appointment.:    Lisa,  Looks like you have canceled appointment for follow-up and review of these tests.  Please schedule an appointment to review these labs and discuss medications including ongoing use of the Zofran/ondansetron.  Digoxin is at a subtherapeutic level, cholesterol is very high with an LDL 3 times what it was 8 months ago.  Thyroid is okay.  Val Irvin MD

## 2023-02-10 NOTE — TELEPHONE ENCOUNTER
Please inform patient that I received a refill request for an excessive amount of Zofran (90 tablets with 11 refills) this is typically an as needed medication.  I am sending in a single small prescription but need her to schedule an appointment to discuss ongoing use.  When I saw her a couple of weeks ago she agreed to return in 1 week to review lab results.  Appears she canceled that appointment.  Please have her schedule to discuss nausea, vomiting and review labs..

## 2023-03-28 RX ORDER — CETIRIZINE HYDROCHLORIDE 10 MG/1
TABLET ORAL
Qty: 90 TABLET | Refills: 3 | Status: SHIPPED | OUTPATIENT
Start: 2023-03-28

## 2023-04-06 DIAGNOSIS — I10 PRIMARY HYPERTENSION: ICD-10-CM

## 2023-04-07 RX ORDER — TRIAMTERENE AND HYDROCHLOROTHIAZIDE 75; 50 MG/1; MG/1
TABLET ORAL
Qty: 90 TABLET | Refills: 3 | Status: SHIPPED | OUTPATIENT
Start: 2023-04-07

## 2023-04-10 DIAGNOSIS — E78.2 MIXED HYPERLIPIDEMIA: ICD-10-CM

## 2023-04-10 DIAGNOSIS — Z51.81 ENCOUNTER FOR MONITORING POSTMENOPAUSAL ESTROGEN REPLACEMENT THERAPY: ICD-10-CM

## 2023-04-10 DIAGNOSIS — Z79.890 ENCOUNTER FOR MONITORING POSTMENOPAUSAL ESTROGEN REPLACEMENT THERAPY: ICD-10-CM

## 2023-04-10 DIAGNOSIS — Z12.31 ENCOUNTER FOR SCREENING MAMMOGRAM FOR BREAST CANCER: ICD-10-CM

## 2023-04-10 DIAGNOSIS — E11.9 TYPE 2 DIABETES MELLITUS WITHOUT COMPLICATION, WITHOUT LONG-TERM CURRENT USE OF INSULIN: ICD-10-CM

## 2023-04-10 DIAGNOSIS — I63.9 CEREBROVASCULAR ACCIDENT (CVA), UNSPECIFIED MECHANISM: ICD-10-CM

## 2023-04-10 DIAGNOSIS — N95.1 MENOPAUSAL SYNDROME (HOT FLASHES): ICD-10-CM

## 2023-04-10 DIAGNOSIS — E03.9 ACQUIRED HYPOTHYROIDISM: ICD-10-CM

## 2023-04-10 RX ORDER — ROSUVASTATIN CALCIUM 20 MG/1
20 TABLET, COATED ORAL DAILY
Qty: 90 TABLET | Refills: 0 | Status: SHIPPED | OUTPATIENT
Start: 2023-04-10

## 2023-04-10 RX ORDER — FLURBIPROFEN SODIUM 0.3 MG/ML
1 SOLUTION/ DROPS OPHTHALMIC DAILY
Qty: 100 EACH | Refills: 3 | Status: SHIPPED | OUTPATIENT
Start: 2023-04-10

## 2023-04-10 RX ORDER — LEVOTHYROXINE SODIUM 0.05 MG/1
50 TABLET ORAL DAILY
Qty: 90 TABLET | Refills: 2 | Status: SHIPPED | OUTPATIENT
Start: 2023-04-10

## 2023-04-10 RX ORDER — MECLIZINE HYDROCHLORIDE 25 MG/1
25 TABLET ORAL 3 TIMES DAILY PRN
Qty: 90 TABLET | Refills: 1 | Status: SHIPPED | OUTPATIENT
Start: 2023-04-10

## 2023-04-10 NOTE — TELEPHONE ENCOUNTER
Please inform patient I have renewed medications however only going to send in 1 month of estrogen.  She is past due for repeat mammogram.  Please have her do this before additional refills (already ordered).  Please also order lipid panel, CMP, CBC, magnesium level to be drawn before her appointment on May 16.  Thank you

## 2023-04-11 DIAGNOSIS — E11.9 TYPE 2 DIABETES MELLITUS WITHOUT COMPLICATION, WITHOUT LONG-TERM CURRENT USE OF INSULIN: Primary | ICD-10-CM

## 2023-04-11 DIAGNOSIS — E78.2 MIXED HYPERLIPIDEMIA: ICD-10-CM

## 2023-05-03 ENCOUNTER — TELEPHONE (OUTPATIENT)
Dept: FAMILY MEDICINE CLINIC | Facility: CLINIC | Age: 73
End: 2023-05-03
Payer: MEDICARE

## 2023-05-03 NOTE — TELEPHONE ENCOUNTER
Patient called and said she is having intermittent dizziness and headaches. She thought it was one of her medicines so she tried stopping her medicines one at a time each day for the last week. She said she felt better being off the jardiance but her blood sugars were high. So she didn't want to stay off of it to long. She said she turned around today and fell due to dizziness. She didn't hit her head or hurt herself, but does have a headache with sharp pain in her temple and over her eye. She denies nausea, vomiting, fever, chest pains, or shortness of breath. she denies being dizzy right now and says she hasn't checked her blood pressure at all and last checked her blood sugar 2 days ago. I asked patient if she could come in today for a visit and she said she can not drive.  I advised patient with these symptoms and her stating she has had a stroke in the past I want her to go to the emergency room right away. I offered to call 911, she declined and said she would call her son and then call 911 to go via ambulance. I then advised patient to call us as soon as she is released and we can work her in to follow up on her hospital visit with . she agreed.

## 2023-05-03 NOTE — TELEPHONE ENCOUNTER
Agree with advice to proceed to emergency department for evaluation.  I will be looking for emergency department records and see if she is currently scheduled on May 16, please work her in sooner if needed.  Thank you

## 2023-05-15 NOTE — PROGRESS NOTES
Chief Complaint  Follow-up, Hyperlipidemia, and Diabetes    History of Present Illness  Lula Burnett presents today for for the first time since September for follow-up on hyperlipidemia, diabetes and ER follow up.  Since last visit she has canceled or no showed 6 visits.  Patient did not have labs as ordered prior to today's visit.    Hyperlipidemia  Patient is following a low cholesterol diet.   Currently is on statin therapy.  Patient reports is exercising.     Diabetes  Patient does check blood sugar at home 1 times daily.  Per patient fasting blood sugars range between 117-120.  Associated symptoms include {Blurred vision and Fatigue  Per patient current diet is Well Balanced.  Patient does avoid concentrated sweets.  Patient does not see podiatry.  Patient see's Dr. Christina for diabetic eye exam and last eye exam was within the last year.    Patient was seen at [facility] ER on 05/03/2023 for dizziness.   Patient was dx with Vertigo and reported she has meclizine at home.  She has been taking meclizine 3 times daily for several years since previous stroke.  Patient did have CT, EKG and lab work done in ER.  These records are reviewed.          Current Outpatient Medications on File Prior to Visit   Medication Sig   • Adalimumab (Humira Pen) 40 MG/0.8ML Pen-injector Kit Inject 40 mg under the skin into the appropriate area as directed Every 14 (Fourteen) Days.   • ALPRAZolam (XANAX) 0.25 MG tablet Take 0.5 tablets by mouth 2 (Two) Times a Day.   • carvedilol (COREG) 6.25 MG tablet Take 1 tablet by mouth 2 (Two) Times a Day With Meals.   • cetirizine (zyrTEC) 10 MG tablet TAKE 1 TABLET DAILY   • clopidogrel (PLAVIX) 75 MG tablet Take 1 tablet by mouth Daily.   • cyclobenzaprine (FLEXERIL) 10 MG tablet Take 1 tablet by mouth 3 (Three) Times a Day As Needed for Muscle Spasms.   • digoxin (LANOXIN) 250 MCG tablet Take 1 tablet by mouth Daily.   • estrogens, conjugated, (PREMARIN) 1.25 MG tablet Take 1 tablet by mouth  Daily.   • Fluticasone-Salmeterol (ADVAIR/WIXELA) 250-50 MCG/ACT DISKUS Inhale 1 puff 2 (Two) Times a Day.   • ibuprofen (ADVIL,MOTRIN) 600 MG tablet Take 1 tablet by mouth Every 6 (Six) Hours As Needed for Mild Pain.   • Insulin Glargine (Lantus SoloStar) 100 UNIT/ML injection pen Inject 10units daily.   • Insulin Pen Needle (B-D UF III MINI PEN NEEDLES) 31G X 5 MM misc 1 Piece Daily.   • levothyroxine (SYNTHROID, LEVOTHROID) 50 MCG tablet Take 1 tablet by mouth Daily.   • montelukast (SINGULAIR) 10 MG tablet Take 1 tablet by mouth Every Night.   • Multiple Vitamins-Minerals (CENTRUM FRESH/FRUITY 50+ PO) Take  by mouth.   • nitroglycerin (NITROSTAT) 0.4 MG SL tablet 1 under the tongue as needed for angina, may repeat q5mins for up three doses   • ondansetron ODT (ZOFRAN-ODT) 8 MG disintegrating tablet PLACE 1 TABLET ON THE TONGUE EVERY 8 HOURS AS NEEDED FOR NAUSEA OR VOMITING   • pantoprazole (PROTONIX) 40 MG EC tablet Take 1 tablet by mouth Daily.   • PARoxetine (PAXIL) 20 MG tablet Take 1 tablet by mouth Daily.   • potassium chloride (K-DUR,KLOR-CON) 20 MEQ CR tablet Take 1 tablet by mouth Daily.   • ProAir  (90 Base) MCG/ACT inhaler Inhale 2 puffs Every 4 (Four) Hours As Needed for Wheezing or Shortness of Air.   • rosuvastatin (Crestor) 20 MG tablet Take 1 tablet by mouth Daily.   • Synjardy XR  MG tablet sustained-release 24 hour Take 1 tablet by mouth Daily.   • topiramate (TOPAMAX) 25 MG tablet Take 1 tablet by mouth 2 (Two) Times a Day.   • triamterene-hydrochlorothiazide (MAXZIDE) 75-50 MG per tablet TAKE 1 TABLET DAILY   • Turmeric 500 MG capsule Take  by mouth.   • [DISCONTINUED] B Complex-C-Folic Acid (B-COMPLEX/FOLIC ACID/VITAMIN C PO) Take  by mouth.   • [DISCONTINUED] Calcium Carb-Cholecalciferol (CALCIUM 1000 + D PO) Take 1,200 mg by mouth Daily. D3 1000 MG   • [DISCONTINUED] meclizine (ANTIVERT) 25 MG tablet Take 1 tablet by mouth 3 (Three) Times a Day As Needed for Dizziness.   •  "[DISCONTINUED] cholecalciferol (VITAMIN D3) 25 MCG (1000 UT) tablet Take 2,000 Units by mouth Daily.   • [DISCONTINUED] Misc Natural Products (Leg Vein & Circulation) tablet Take 360 mg by mouth Daily.     No current facility-administered medications on file prior to visit.       Objective   Vital Signs:   /72 (BP Location: Right arm, Patient Position: Sitting, Cuff Size: Adult)   Pulse 112   Temp 97.3 °F (36.3 °C) (Temporal)   Resp 18   Ht 158.8 cm (62.5\")   Wt 76.7 kg (169 lb)   SpO2 94% Comment: room air  BMI 30.42 kg/m²       Physical Exam  Vitals and nursing note reviewed.   Constitutional:       General: She is not in acute distress.     Appearance: She is well-developed.   HENT:      Head: Normocephalic and atraumatic.   Cardiovascular:      Rate and Rhythm: Normal rate and regular rhythm.      Heart sounds: No murmur heard.  Pulmonary:      Effort: Pulmonary effort is normal.      Breath sounds: Normal breath sounds. No wheezing.   Musculoskeletal:         General: Normal range of motion.   Skin:     General: Skin is warm and dry.      Findings: No rash.   Neurological:      Mental Status: She is alert and oriented to person, place, and time.   Psychiatric:      Comments: Patient is very talkative, tangential/circumferential and interruptive during medical encounter            No visits with results within 1 Day(s) from this visit.   Latest known visit with results is:   Office Visit on 01/24/2023   Component Date Value Ref Range Status   • Hemoglobin A1C 01/24/2023 7.2 (H)  4.8 - 5.6 % Final    Comment:          Prediabetes: 5.7 - 6.4           Diabetes: >6.4           Glycemic control for adults with diabetes: <7.0     • Glucose 01/24/2023 92  70 - 99 mg/dL Final   • BUN 01/24/2023 26  8 - 27 mg/dL Final   • Creatinine 01/24/2023 0.81  0.57 - 1.00 mg/dL Final   • EGFR Result 01/24/2023 76  >59 mL/min/1.73 Final   • BUN/Creatinine Ratio 01/24/2023 32 (H)  12 - 28 Final   • Sodium 01/24/2023 134 "  134 - 144 mmol/L Final   • Potassium 01/24/2023 4.0  3.5 - 5.2 mmol/L Final   • Chloride 01/24/2023 90 (L)  96 - 106 mmol/L Final   • Total CO2 01/24/2023 28  20 - 29 mmol/L Final   • Calcium 01/24/2023 10.2  8.7 - 10.3 mg/dL Final   • Total Protein 01/24/2023 7.7  6.0 - 8.5 g/dL Final   • Albumin 01/24/2023 4.9 (H)  3.7 - 4.7 g/dL Final   • Globulin 01/24/2023 2.8  1.5 - 4.5 g/dL Final   • A/G Ratio 01/24/2023 1.8  1.2 - 2.2 Final   • Total Bilirubin 01/24/2023 <0.2  0.0 - 1.2 mg/dL Final   • Alkaline Phosphatase 01/24/2023 76  44 - 121 IU/L Final   • AST (SGOT) 01/24/2023 24  0 - 40 IU/L Final   • ALT (SGPT) 01/24/2023 34 (H)  0 - 32 IU/L Final   • Total Cholesterol 01/24/2023 288 (H)  100 - 199 mg/dL Final   • Triglycerides 01/24/2023 419 (H)  0 - 149 mg/dL Final   • HDL Cholesterol 01/24/2023 78  >39 mg/dL Final   • VLDL Cholesterol Sarthak 01/24/2023 75 (H)  5 - 40 mg/dL Final   • LDL Chol Calc (NIH) 01/24/2023 135 (H)  0 - 99 mg/dL Final   • TSH 01/24/2023 2.800  0.450 - 4.500 uIU/mL Final   • Free T4 01/24/2023 1.25  0.82 - 1.77 ng/dL Final   • WBC 01/24/2023 10.0  3.4 - 10.8 x10E3/uL Final   • RBC 01/24/2023 5.80 (H)  3.77 - 5.28 x10E6/uL Final   • Hemoglobin 01/24/2023 15.7  11.1 - 15.9 g/dL Final   • Hematocrit 01/24/2023 49.1 (H)  34.0 - 46.6 % Final   • MCV 01/24/2023 85  79 - 97 fL Final   • MCH 01/24/2023 27.1  26.6 - 33.0 pg Final   • MCHC 01/24/2023 32.0  31.5 - 35.7 g/dL Final   • RDW 01/24/2023 14.9  11.7 - 15.4 % Final   • Platelets 01/24/2023 387  150 - 450 x10E3/uL Final   • Neutrophil Rel % 01/24/2023 35  Not Estab. % Final   • Lymphocyte Rel % 01/24/2023 58  Not Estab. % Final   • Monocyte Rel % 01/24/2023 6  Not Estab. % Final   • Eosinophil Rel % 01/24/2023 1  Not Estab. % Final   • Basophil Rel % 01/24/2023 0  Not Estab. % Final   • Neutrophils Absolute 01/24/2023 3.5  1.4 - 7.0 x10E3/uL Final   • Lymphocytes Absolute 01/24/2023 5.8 (H)  0.7 - 3.1 x10E3/uL Final   • Monocytes Absolute  01/24/2023 0.6  0.1 - 0.9 x10E3/uL Final   • Eosinophils Absolute 01/24/2023 0.1  0.0 - 0.4 x10E3/uL Final   • Basophils Absolute 01/24/2023 0.0  0.0 - 0.2 x10E3/uL Final   • Immature Granulocyte Rel % 01/24/2023 0  Not Estab. % Final   • Immature Grans Absolute 01/24/2023 0.0  0.0 - 0.1 x10E3/uL Final   • Microalbumin, Urine 01/24/2023 786.6  Not Estab. ug/mL Final    Comment: Results confirmed on  dilution.     • T3, Free 01/24/2023 2.8  2.0 - 4.4 pg/mL Final   • Digoxin 01/24/2023 <0.4 (L)  0.5 - 0.9 ng/mL Final    Comment: Concentrations above 2.0 ng/mL are generally considered  toxic. Some overlap of toxic and non-toxic values have  been reported.                              Detection Limit = 0.4 ng/mL  Therapeutic range is derived from 2013 ACCF/AHA  Guidelines for the Management of Heart Failure.  **Verified by repeat analysis**     • Magnesium 01/24/2023 2.4 (H)  1.6 - 2.3 mg/dL Final     A1C Last 3 Results        9/21/2022    14:49 1/24/2023    15:04   HGBA1C Last 3 Results   Hemoglobin A1C 7.0   7.2       Lab Results   Component Value Date    CHOL 147 02/04/2022    CHLPL 288 (H) 01/24/2023    TRIG 419 (H) 01/24/2023    HDL 78 01/24/2023     (H) 01/24/2023     Lab Results   Component Value Date    TSH 2.800 01/24/2023     Lab Results   Component Value Date    GLUCOSE 92 01/24/2023    BUN 26 01/24/2023    CREATININE 0.81 01/24/2023    EGFRIFNONA 51 (L) 02/04/2022    EGFRIFAFRI 62 02/04/2022    BCR 32 (H) 01/24/2023    K 4.0 01/24/2023    CO2 28 01/24/2023    CALCIUM 10.2 01/24/2023    PROTENTOTREF 7.7 01/24/2023    ALBUMIN 4.9 (H) 01/24/2023    LABIL2 1.8 01/24/2023    AST 24 01/24/2023    ALT 34 (H) 01/24/2023     Lab Results   Component Value Date    WBC 10.0 01/24/2023    HGB 15.7 01/24/2023    HCT 49.1 (H) 01/24/2023    MCV 85 01/24/2023     01/24/2023                      Assessment and Plan    Diagnoses and all orders for this visit:    1. Mixed hyperlipidemia (Primary)  -      Comprehensive Metabolic Panel  -     Lipid Panel    2. Type 2 diabetes mellitus without complication, without long-term current use of insulin  -     Hemoglobin A1c    3. Vertigo    4. Overweight (BMI 25.0-29.9)    5. Former smoker    6. Cerebrovascular accident (CVA), unspecified mechanism  -     meclizine (ANTIVERT) 25 MG tablet; Take 1 tablet by mouth 3 (Three) Times a Day As Needed for Dizziness.  Dispense: 180 tablet; Refill: 1    7. Primary hypertension  -     Comprehensive Metabolic Panel  -     CBC & Differential    8. Acquired hypothyroidism  -     TSH  -     T4, Free    9. Paroxysmal atrial fibrillation  -     Digoxin level    10. High serum magnesium  -     Magnesium    11. Memory impairment    12. History of noncompliance with medical treatment      Patient has longstanding dizziness/vertigo since stroke several years ago.  She is currently taking Antivert 3 tablets daily and does help somewhat with both the dizziness and ringing in her ears.  She has previously gone through vestibular rehab with no benefit and does not want to do physical therapy again.  I am renewing Antivert today.  Patient was seen in the emergency department for vertigo.  CT scan on 5/3/2023 was negative for acute ischemic infarct.  Did show evidence of remote Right parietal occipital lobe infarct as well as small remote lacunar infarcts in the bilateral basal ganglia.    Patient states she has collected and will have her Cologuard picked up today.  She will reschedule mammogram and DEXA scan that she has previously canceled    Diabetes uncertain control checking labs today as ordered above.    Previously digoxin was subtherapeutic, patient is confusing and initially states she is taking both digoxin and Lanoxin but with clarification states only 1 at a time but still getting refills for both prescriptions.  Checking today to make sure at therapeutic level and not toxic as this could account for some of her dizziness.    Canceling  previously ordered labs intended to do months ago and reordering today to include an A1c.      Medications Discontinued During This Encounter   Medication Reason   • Calcium Carb-Cholecalciferol (CALCIUM 1000 + D PO) *Therapy completed   • B Complex-C-Folic Acid (B-COMPLEX/FOLIC ACID/VITAMIN C PO) *Therapy completed   • cholecalciferol (VITAMIN D3) 25 MCG (1000 UT) tablet *Therapy completed   • Misc Natural Products (Leg Vein & Circulation) tablet *Therapy completed   • meclizine (ANTIVERT) 25 MG tablet Reorder         Follow Up     Return in about 1 week (around 5/23/2023) for Recheck, diabetes review labs.    Patient was given instructions and counseling regarding her condition or for health maintenance advice. Please see specific information pulled into the AVS if appropriate.

## 2023-05-16 ENCOUNTER — OFFICE VISIT (OUTPATIENT)
Dept: FAMILY MEDICINE CLINIC | Facility: CLINIC | Age: 73
End: 2023-05-16
Payer: MEDICARE

## 2023-05-16 VITALS
DIASTOLIC BLOOD PRESSURE: 72 MMHG | RESPIRATION RATE: 18 BRPM | HEART RATE: 112 BPM | OXYGEN SATURATION: 94 % | WEIGHT: 169 LBS | BODY MASS INDEX: 29.95 KG/M2 | SYSTOLIC BLOOD PRESSURE: 132 MMHG | TEMPERATURE: 97.3 F | HEIGHT: 63 IN

## 2023-05-16 DIAGNOSIS — E66.3 OVERWEIGHT (BMI 25.0-29.9): ICD-10-CM

## 2023-05-16 DIAGNOSIS — Z87.891 FORMER SMOKER: ICD-10-CM

## 2023-05-16 DIAGNOSIS — Z91.199 HISTORY OF NONCOMPLIANCE WITH MEDICAL TREATMENT: ICD-10-CM

## 2023-05-16 DIAGNOSIS — E03.9 ACQUIRED HYPOTHYROIDISM: ICD-10-CM

## 2023-05-16 DIAGNOSIS — R41.3 MEMORY IMPAIRMENT: ICD-10-CM

## 2023-05-16 DIAGNOSIS — I10 PRIMARY HYPERTENSION: ICD-10-CM

## 2023-05-16 DIAGNOSIS — E78.2 MIXED HYPERLIPIDEMIA: Primary | ICD-10-CM

## 2023-05-16 DIAGNOSIS — R42 VERTIGO: ICD-10-CM

## 2023-05-16 DIAGNOSIS — I48.0 PAROXYSMAL ATRIAL FIBRILLATION: ICD-10-CM

## 2023-05-16 DIAGNOSIS — E11.9 TYPE 2 DIABETES MELLITUS WITHOUT COMPLICATION, WITHOUT LONG-TERM CURRENT USE OF INSULIN: ICD-10-CM

## 2023-05-16 DIAGNOSIS — I63.9 CEREBROVASCULAR ACCIDENT (CVA), UNSPECIFIED MECHANISM: ICD-10-CM

## 2023-05-16 DIAGNOSIS — R79.89 HIGH SERUM MAGNESIUM: ICD-10-CM

## 2023-05-16 RX ORDER — MECLIZINE HYDROCHLORIDE 25 MG/1
25 TABLET ORAL 3 TIMES DAILY PRN
Qty: 180 TABLET | Refills: 1 | Status: SHIPPED | OUTPATIENT
Start: 2023-05-16

## 2023-06-15 ENCOUNTER — HOSPITAL ENCOUNTER (OUTPATIENT)
Dept: BONE DENSITY | Facility: HOSPITAL | Age: 73
Discharge: HOME | End: 2023-06-15
Admitting: FAMILY MEDICINE
Payer: MEDICARE

## 2023-06-15 DIAGNOSIS — Z78.0 MENOPAUSE: ICD-10-CM

## 2023-06-15 PROCEDURE — 77080 DXA BONE DENSITY AXIAL: CPT

## 2023-06-20 ENCOUNTER — TELEPHONE (OUTPATIENT)
Dept: FAMILY MEDICINE CLINIC | Facility: CLINIC | Age: 73
End: 2023-06-20

## 2023-06-20 NOTE — TELEPHONE ENCOUNTER
Mrs ramirez called in today wanting to get an update on her premarin, she stated that she requested it back on the 14th and is still waiting for an update. Call back [phone]

## 2023-06-28 LAB
ALBUMIN SERPL-MCNC: 4.6 G/DL (ref 3.7–4.7)
ALBUMIN/GLOB SERPL: 1.7 {RATIO} (ref 1.2–2.2)
ALP SERPL-CCNC: 100 IU/L (ref 44–121)
ALT SERPL-CCNC: 66 IU/L (ref 0–32)
AST SERPL-CCNC: 78 IU/L (ref 0–40)
BASOPHILS # BLD AUTO: 0 X10E3/UL (ref 0–0.2)
BASOPHILS NFR BLD AUTO: 0 %
BILIRUB SERPL-MCNC: 0.3 MG/DL (ref 0–1.2)
BUN SERPL-MCNC: 24 MG/DL (ref 8–27)
BUN/CREAT SERPL: 23 (ref 12–28)
CALCIUM SERPL-MCNC: 10.3 MG/DL (ref 8.7–10.3)
CHLORIDE SERPL-SCNC: 93 MMOL/L (ref 96–106)
CHOLEST SERPL-MCNC: 184 MG/DL (ref 100–199)
CO2 SERPL-SCNC: 24 MMOL/L (ref 20–29)
CREAT SERPL-MCNC: 1.06 MG/DL (ref 0.57–1)
DIGOXIN SERPL-MCNC: 1.9 NG/ML (ref 0.5–0.9)
EGFRCR SERPLBLD CKD-EPI 2021: 55 ML/MIN/1.73
EOSINOPHIL # BLD AUTO: 0.2 X10E3/UL (ref 0–0.4)
EOSINOPHIL NFR BLD AUTO: 2 %
ERYTHROCYTE [DISTWIDTH] IN BLOOD BY AUTOMATED COUNT: 14.5 % (ref 11.7–15.4)
GLOBULIN SER CALC-MCNC: 2.7 G/DL (ref 1.5–4.5)
GLUCOSE SERPL-MCNC: 188 MG/DL (ref 70–99)
HBA1C MFR BLD: 8.8 % (ref 4.8–5.6)
HCT VFR BLD AUTO: 50.2 % (ref 34–46.6)
HDLC SERPL-MCNC: 58 MG/DL
HGB BLD-MCNC: 16.5 G/DL (ref 11.1–15.9)
IMM GRANULOCYTES # BLD AUTO: 0 X10E3/UL (ref 0–0.1)
IMM GRANULOCYTES NFR BLD AUTO: 0 %
LDLC SERPL CALC-MCNC: 56 MG/DL (ref 0–99)
LYMPHOCYTES # BLD AUTO: 3.7 X10E3/UL (ref 0.7–3.1)
LYMPHOCYTES NFR BLD AUTO: 40 %
MAGNESIUM SERPL-MCNC: 2 MG/DL (ref 1.6–2.3)
MCH RBC QN AUTO: 28.3 PG (ref 26.6–33)
MCHC RBC AUTO-ENTMCNC: 32.9 G/DL (ref 31.5–35.7)
MCV RBC AUTO: 86 FL (ref 79–97)
MONOCYTES # BLD AUTO: 0.5 X10E3/UL (ref 0.1–0.9)
MONOCYTES NFR BLD AUTO: 5 %
NEUTROPHILS # BLD AUTO: 4.9 X10E3/UL (ref 1.4–7)
NEUTROPHILS NFR BLD AUTO: 53 %
PLATELET # BLD AUTO: 363 X10E3/UL (ref 150–450)
POTASSIUM SERPL-SCNC: 4.4 MMOL/L (ref 3.5–5.2)
PROT SERPL-MCNC: 7.3 G/DL (ref 6–8.5)
RBC # BLD AUTO: 5.83 X10E6/UL (ref 3.77–5.28)
SODIUM SERPL-SCNC: 136 MMOL/L (ref 134–144)
T4 FREE SERPL-MCNC: 1.36 NG/DL (ref 0.82–1.77)
TRIGL SERPL-MCNC: 471 MG/DL (ref 0–149)
TSH SERPL DL<=0.005 MIU/L-ACNC: 1.78 UIU/ML (ref 0.45–4.5)
VLDLC SERPL CALC-MCNC: 70 MG/DL (ref 5–40)
WBC # BLD AUTO: 9.3 X10E3/UL (ref 3.4–10.8)

## 2023-07-24 DIAGNOSIS — E03.9 ACQUIRED HYPOTHYROIDISM: ICD-10-CM

## 2023-07-24 RX ORDER — LEVOTHYROXINE SODIUM 0.05 MG/1
50 TABLET ORAL DAILY
Qty: 90 TABLET | Refills: 0 | Status: SHIPPED | OUTPATIENT
Start: 2023-07-24

## 2023-07-26 ENCOUNTER — TELEPHONE (OUTPATIENT)
Dept: FAMILY MEDICINE CLINIC | Facility: CLINIC | Age: 73
End: 2023-07-26

## 2023-07-26 DIAGNOSIS — R74.8 ELEVATED LIVER ENZYMES: Primary | ICD-10-CM

## 2023-07-26 DIAGNOSIS — I48.0 PAROXYSMAL ATRIAL FIBRILLATION: ICD-10-CM

## 2023-07-26 RX ORDER — DIGOXIN 125 MCG
TABLET ORAL
Qty: 135 TABLET | Refills: 0 | Status: SHIPPED | OUTPATIENT
Start: 2023-07-26

## 2023-07-26 NOTE — TELEPHONE ENCOUNTER
"Reviewing records on 6/28/2023 digoxin level was 1.9, near toxic range, sent message:  \"Need to skip 3 days of Lanoxin and reduce digoxin (Lanoxin) from 250 mcg daily to 125 mcg daily, new prescription has been sent to pharmacy.  Need to follow-up with repeat digoxin level in approximately 2 weeks and schedule an appointment to review these lab results.\"     Appears patient canceled appointment on 7/17 and rescheduled for August 29.      I would like her to try increasing Lanoxin to 2 tablets alternating with 1 tablet every other day and check her labs in 2 weeks and schedule an appointment to discuss at that time how to continue medication going forward  "

## 2023-07-26 NOTE — TELEPHONE ENCOUNTER
"Caller: Lula Burnett ROBERT \"CHANCE\"    Relationship: Self    Best call back number: [phone]    What medications are you currently taking:   Current Outpatient Medications on File Prior to Visit   Medication Sig Dispense Refill    Adalimumab (Humira Pen) 40 MG/0.8ML Pen-injector Kit Inject 40 mg under the skin into the appropriate area as directed Every 14 (Fourteen) Days. 4 each 0    ALPRAZolam (XANAX) 0.25 MG tablet Take 0.5 tablets by mouth 2 (Two) Times a Day. 90 tablet 1    carvedilol (COREG) 6.25 MG tablet Take 1 tablet by mouth 2 (Two) Times a Day With Meals. 90 tablet 1    cetirizine (zyrTEC) 10 MG tablet TAKE 1 TABLET DAILY 90 tablet 3    clopidogrel (PLAVIX) 75 MG tablet Take 1 tablet by mouth Daily. 90 tablet 1    cyclobenzaprine (FLEXERIL) 10 MG tablet Take 1 tablet by mouth 3 (Three) Times a Day As Needed for Muscle Spasms. 90 tablet 1    digoxin (LANOXIN) 125 MCG tablet Take 1 tablet by mouth Daily. 90 tablet 0    estrogens, conjugated, (PREMARIN) 1.25 MG tablet Take 1 tablet by mouth Daily. 90 tablet 3    Fluticasone-Salmeterol (ADVAIR/WIXELA) 250-50 MCG/ACT DISKUS Inhale 1 puff 2 (Two) Times a Day. 180 each 3    ibuprofen (ADVIL,MOTRIN) 600 MG tablet Take 1 tablet by mouth Every 6 (Six) Hours As Needed for Mild Pain.      Insulin Glargine (Lantus SoloStar) 100 UNIT/ML injection pen Inject 10units daily. 15 mL 2    Insulin Pen Needle (B-D UF III MINI PEN NEEDLES) 31G X 5 MM misc 1 Piece Daily. 100 each 3    levothyroxine (SYNTHROID, LEVOTHROID) 50 MCG tablet TAKE 1 TABLET BY MOUTH DAILY 90 tablet 0    meclizine (ANTIVERT) 25 MG tablet TAKE 1 TABLET THREE TIMES A DAY AS NEEDED FOR DIZZINESS 90 tablet 1    montelukast (SINGULAIR) 10 MG tablet Take 1 tablet by mouth Every Night. 90 tablet 3    Multiple Vitamins-Minerals (CENTRUM FRESH/FRUITY 50+ PO) Take  by mouth.      nitroglycerin (NITROSTAT) 0.4 MG SL tablet 1 under the tongue as needed for angina, may repeat q5mins for up three doses 20 tablet 1 "    ondansetron ODT (ZOFRAN-ODT) 8 MG disintegrating tablet PLACE 1 TABLET ON THE TONGUE EVERY 8 HOURS AS NEEDED FOR NAUSEA OR VOMITING 30 tablet 0    pantoprazole (PROTONIX) 40 MG EC tablet Take 1 tablet by mouth Daily. 90 tablet 3    PARoxetine (PAXIL) 20 MG tablet Take 1 tablet by mouth Daily. 90 tablet 3    potassium chloride (K-DUR,KLOR-CON) 20 MEQ CR tablet TAKE 1 TABLET DAILY 90 tablet 0    ProAir  (90 Base) MCG/ACT inhaler Inhale 2 puffs Every 4 (Four) Hours As Needed for Wheezing or Shortness of Air. 18 g 3    rosuvastatin (CRESTOR) 20 MG tablet TAKE 1 TABLET DAILY 90 tablet 3    Synjardy XR  MG tablet sustained-release 24 hour Take 1 tablet by mouth Daily. 90 tablet 3    topiramate (TOPAMAX) 25 MG tablet Take 1 tablet by mouth 2 (Two) Times a Day. 180 tablet 3    triamterene-hydrochlorothiazide (MAXZIDE) 75-50 MG per tablet TAKE 1 TABLET DAILY 90 tablet 3    Turmeric 500 MG capsule Take  by mouth.       No current facility-administered medications on file prior to visit.          When did you start taking these medications: N/A    Which medication are you concerned about:   digoxin (LANOXIN) 125 MCG tablet  125 mcg, Daily Digoxin       Who prescribed you this medication: DR. VALENCIA     What are your concerns: PATIENT CALLED AND SAID SINCE THE DOSAGE CHANGE ON THIS MEDICATION SHE HAS BEEN HAVING HEART PALPITATIONS AND THINKS SHE NEEDS TO GO BACK ON THE INCREASED DOSAGE    PATIENT SAID SHE HAD BEEN HAVING THE PALPITATIONS BEFORE THE DOSAGE SHE WAS ORIGINALLY ON WAS STARTED AND THEY HAD IMPROVED     How long have you had these concerns: N/A

## 2023-08-27 DIAGNOSIS — I63.9 CEREBROVASCULAR ACCIDENT (CVA), UNSPECIFIED MECHANISM: ICD-10-CM

## 2023-08-28 RX ORDER — MECLIZINE HYDROCHLORIDE 25 MG/1
TABLET ORAL
Qty: 90 TABLET | Refills: 0 | Status: SHIPPED | OUTPATIENT
Start: 2023-08-28

## 2023-08-29 ENCOUNTER — TELEPHONE (OUTPATIENT)
Dept: FAMILY MEDICINE CLINIC | Facility: CLINIC | Age: 73
End: 2023-08-29

## 2023-08-29 NOTE — TELEPHONE ENCOUNTER
Previous labs showed digoxin was in toxic range in June, dig toxicity can cause dizziness.  She has been instructed to reduce digoxin to 125 mcg daily and repeat lab last month and she has not yet done so.  Please have her get labs as currently ordered, a digoxin and a CMP, ASAP to rule out dig toxicity as the cause of her dizziness.  Once these labs are completed I will then add new order including an A1c that would be due on or after September 27.

## 2023-08-29 NOTE — TELEPHONE ENCOUNTER
"Caller: Lula Burnett \"CHANCE\"    Relationship: Self    Best call back number: [phone]    What orders are you requesting (i.e. lab or imaging): LABS BEFORE NEXT APPOINTMENT, FOR BLOOD SUGAR     In what timeframe would the patient need to come in: ASAP    Where will you receive your lab/imaging services: OFFICE    Additional notes: PATIENT CALLED AND CANCELLED HER VISIT TODAY DUE TO VERTIGO, SHE RESCHEDULED FOR OCTOBER, AND SAID SHE WANTED TO HAVE LABS DONE BEFOREHAND       "

## 2023-09-22 DIAGNOSIS — I63.9 CEREBROVASCULAR ACCIDENT (CVA), UNSPECIFIED MECHANISM: ICD-10-CM

## 2023-09-22 RX ORDER — MECLIZINE HYDROCHLORIDE 25 MG/1
TABLET ORAL
Qty: 90 TABLET | Refills: 0 | Status: SHIPPED | OUTPATIENT
Start: 2023-09-22

## 2023-09-26 DIAGNOSIS — I10 PRIMARY HYPERTENSION: ICD-10-CM

## 2023-09-26 RX ORDER — CLOPIDOGREL BISULFATE 75 MG/1
TABLET ORAL
Qty: 90 TABLET | Refills: 3 | Status: SHIPPED | OUTPATIENT
Start: 2023-09-26

## 2023-09-29 DIAGNOSIS — I10 HYPERTENSION, UNSPECIFIED TYPE: ICD-10-CM

## 2023-10-03 RX ORDER — POTASSIUM CHLORIDE 20 MEQ/1
TABLET, EXTENDED RELEASE ORAL
Qty: 21 TABLET | Refills: 0 | Status: SHIPPED | OUTPATIENT
Start: 2023-10-03

## 2023-10-03 NOTE — TELEPHONE ENCOUNTER
Appears patient canceled appointment on 7/17, rescheduled for August 29 which was also canceled.  As a result her current lab order is outdated.  Labs intended for August were to follow-up on a high digoxin level.  Please cancel old lab orders and reorder an A1c, CMP, digoxin level, CBC, TSH, T4, and lipid panel.  Patient must have these labs drawn at least 1 day prior to upcoming appointment.  I am renewing potassium to last through appointment date only.  Please have her bring all medication bottles with her to appointment in case adjustments need to be made.

## 2023-10-04 ENCOUNTER — TELEPHONE (OUTPATIENT)
Dept: FAMILY MEDICINE CLINIC | Facility: CLINIC | Age: 73
End: 2023-10-04
Payer: MEDICARE

## 2023-10-04 DIAGNOSIS — I10 HYPERTENSION, UNSPECIFIED TYPE: Primary | ICD-10-CM

## 2023-10-04 DIAGNOSIS — E03.9 HYPOTHYROIDISM, UNSPECIFIED TYPE: ICD-10-CM

## 2023-10-04 DIAGNOSIS — R78.89 ELEVATED DIGOXIN LEVEL: ICD-10-CM

## 2023-10-04 DIAGNOSIS — E11.9 TYPE 2 DIABETES MELLITUS WITHOUT COMPLICATION, WITHOUT LONG-TERM CURRENT USE OF INSULIN: ICD-10-CM

## 2023-10-04 DIAGNOSIS — E78.2 MIXED HYPERLIPIDEMIA: ICD-10-CM

## 2023-10-04 DIAGNOSIS — R74.8 ELEVATED LIVER ENZYMES: ICD-10-CM

## 2023-11-14 DIAGNOSIS — I10 PRIMARY HYPERTENSION: ICD-10-CM

## 2023-11-16 RX ORDER — CARVEDILOL 6.25 MG/1
6.25 TABLET ORAL 2 TIMES DAILY WITH MEALS
Qty: 90 TABLET | Refills: 7 | OUTPATIENT
Start: 2023-11-16

## 2023-11-17 NOTE — TELEPHONE ENCOUNTER
Patient scheduled appt in January and was informed the get labs as well, she verbalized understanding.

## 2023-11-20 RX ORDER — CARVEDILOL 6.25 MG/1
6.25 TABLET ORAL 2 TIMES DAILY WITH MEALS
Qty: 28 TABLET | Refills: 0 | Status: SHIPPED | OUTPATIENT
Start: 2023-11-20

## 2023-11-20 NOTE — TELEPHONE ENCOUNTER
To prevent lapse in therapy, I have sent a 2-week supply of carvedilol to take 1 twice daily to [facility] in [facility].    I would like her to bring in ALL medication bottles to her appointment to reduce any chance of confusion/misunderstanding and discussing her medications.

## 2023-11-21 ENCOUNTER — TELEPHONE (OUTPATIENT)
Dept: FAMILY MEDICINE CLINIC | Facility: CLINIC | Age: 73
End: 2023-11-21
Payer: MEDICARE

## 2023-11-21 NOTE — TELEPHONE ENCOUNTER
Caller: CHARLES    Relationship:     Best call back number: [phone]     Who are you requesting to speak with (clinical staff, provider,  specific staff member): CLINICAL    What was the call regarding: CHARLES STATED THAT THEY FAX OVER A REQUEST FOR PATIENTS CONTINUOUS GLUCOSE MONITOR     CHARLES IS REQUESTING TO KNOW IF CLINICAL RECEIVED THIS FAX AND AN UPDATE ON IT    PLEASE ADVISE

## 2023-11-27 DIAGNOSIS — E11.9 TYPE 2 DIABETES MELLITUS WITHOUT COMPLICATION, WITHOUT LONG-TERM CURRENT USE OF INSULIN: ICD-10-CM

## 2023-11-27 RX ORDER — INSULIN GLARGINE 100 [IU]/ML
INJECTION, SOLUTION SUBCUTANEOUS
Qty: 15 ML | Refills: 2 | Status: ON HOLD | OUTPATIENT
Start: 2023-11-27 | End: 2023-12-04 | Stop reason: SDUPTHER

## 2023-11-29 ENCOUNTER — TELEPHONE (OUTPATIENT)
Dept: FAMILY MEDICINE CLINIC | Facility: CLINIC | Age: 73
End: 2023-11-29

## 2023-11-29 NOTE — TELEPHONE ENCOUNTER
Please send patient a dismissal letter.  She canceled appointment today citing the weather, apparently rescheduled for December 11 and canceled that appointment as well.  Prior to today she had already canceled 4 appointments and no-showed once since last appointment in June.  At that time digoxin was in toxic range.  She has been noncompliant with having lab recheck since dose adjusted.  Should have run out of digoxin in October.  She has been warned any additional no-shows or cancellations she will be discussed.

## 2023-12-01 ENCOUNTER — HOSPITAL ENCOUNTER (INPATIENT)
Facility: HOSPITAL | Age: 73
LOS: 1 days | Discharge: HOME | DRG: 066 | End: 2023-12-04
Attending: EMERGENCY MEDICINE | Admitting: HOSPITALIST
Payer: MEDICARE

## 2023-12-01 ENCOUNTER — APPOINTMENT (OUTPATIENT)
Dept: MRI IMAGING | Facility: HOSPITAL | Age: 73
DRG: 066 | End: 2023-12-01
Payer: MEDICARE

## 2023-12-01 ENCOUNTER — APPOINTMENT (OUTPATIENT)
Dept: GENERAL RADIOLOGY | Facility: HOSPITAL | Age: 73
DRG: 066 | End: 2023-12-01
Payer: MEDICARE

## 2023-12-01 DIAGNOSIS — E11.9 TYPE 2 DIABETES MELLITUS WITHOUT COMPLICATION, WITHOUT LONG-TERM CURRENT USE OF INSULIN: ICD-10-CM

## 2023-12-01 DIAGNOSIS — I69.398 IMBALANCE DUE TO OLD STROKE: ICD-10-CM

## 2023-12-01 DIAGNOSIS — I63.9 LEFT PONTINE STROKE: Primary | ICD-10-CM

## 2023-12-01 DIAGNOSIS — R41.82 ALTERED MENTAL STATUS, UNSPECIFIED ALTERED MENTAL STATUS TYPE: ICD-10-CM

## 2023-12-01 DIAGNOSIS — I48.0 PAROXYSMAL ATRIAL FIBRILLATION: ICD-10-CM

## 2023-12-01 DIAGNOSIS — R26.89 IMBALANCE DUE TO OLD STROKE: ICD-10-CM

## 2023-12-01 DIAGNOSIS — N39.0 ACUTE URINARY TRACT INFECTION: ICD-10-CM

## 2023-12-01 PROBLEM — Z00.00 MEDICARE ANNUAL WELLNESS VISIT, SUBSEQUENT: Status: RESOLVED | Noted: 2023-01-24 | Resolved: 2023-12-01

## 2023-12-01 PROBLEM — J02.8 SORE THROAT DUE TO VIRUS: Status: RESOLVED | Noted: 2022-08-04 | Resolved: 2023-12-01

## 2023-12-01 PROBLEM — R74.01 TRANSAMINITIS: Status: ACTIVE | Noted: 2023-12-01

## 2023-12-01 PROBLEM — A49.9 UTI (URINARY TRACT INFECTION), BACTERIAL: Status: ACTIVE | Noted: 2023-12-01

## 2023-12-01 PROBLEM — E66.811 CLASS 1 OBESITY IN ADULT: Chronic | Status: ACTIVE | Noted: 2022-09-21

## 2023-12-01 PROBLEM — B97.89 SORE THROAT DUE TO VIRUS: Status: RESOLVED | Noted: 2022-08-04 | Resolved: 2023-12-01

## 2023-12-01 PROBLEM — I20.0 UNSTABLE ANGINA: Status: RESOLVED | Noted: 2022-02-01 | Resolved: 2023-12-01

## 2023-12-01 PROBLEM — L40.9 PSORIASIS: Status: RESOLVED | Noted: 2022-02-01 | Resolved: 2023-12-01

## 2023-12-01 PROBLEM — R52 GENERALIZED BODY ACHES: Status: RESOLVED | Noted: 2022-08-04 | Resolved: 2023-12-01

## 2023-12-01 PROBLEM — E66.9 CLASS 1 OBESITY IN ADULT: Chronic | Status: ACTIVE | Noted: 2022-09-21

## 2023-12-01 PROBLEM — Z87.891 FORMER SMOKER: Status: RESOLVED | Noted: 2022-09-21 | Resolved: 2023-12-01

## 2023-12-01 PROBLEM — J45.909 ASTHMA: Status: RESOLVED | Noted: 2023-12-01 | Resolved: 2023-12-01

## 2023-12-01 PROBLEM — U07.1 COVID-19: Status: RESOLVED | Noted: 2022-08-04 | Resolved: 2023-12-01

## 2023-12-01 PROBLEM — R41.3 MEMORY IMPAIRMENT: Chronic | Status: ACTIVE | Noted: 2023-05-16

## 2023-12-01 PROBLEM — R51.9 HEADACHE DUE TO VIRAL INFECTION: Status: RESOLVED | Noted: 2022-08-04 | Resolved: 2023-12-01

## 2023-12-01 PROBLEM — R79.89 HIGH SERUM MAGNESIUM: Status: RESOLVED | Noted: 2023-05-16 | Resolved: 2023-12-01

## 2023-12-01 PROBLEM — I34.81 MITRAL VALVE ANNULAR CALCIFICATION: Status: RESOLVED | Noted: 2022-02-01 | Resolved: 2023-12-01

## 2023-12-01 PROBLEM — J45.909 ASTHMA: Status: ACTIVE | Noted: 2023-12-01

## 2023-12-01 PROBLEM — R42 VERTIGO: Status: RESOLVED | Noted: 2023-05-16 | Resolved: 2023-12-01

## 2023-12-01 PROBLEM — B34.9 HEADACHE DUE TO VIRAL INFECTION: Status: RESOLVED | Noted: 2022-08-04 | Resolved: 2023-12-01

## 2023-12-01 LAB
ALBUMIN SERPL-MCNC: 4.5 G/DL (ref 3.5–5.2)
ALBUMIN/GLOB SERPL: 1.4 G/DL
ALP SERPL-CCNC: 129 U/L (ref 39–117)
ALT SERPL W P-5'-P-CCNC: 110 U/L (ref 1–33)
ANION GAP SERPL CALCULATED.3IONS-SCNC: 21 MMOL/L (ref 5–15)
AST SERPL-CCNC: 145 U/L (ref 1–32)
BACTERIA UR QL AUTO: ABNORMAL /HPF
BASOPHILS # BLD AUTO: 0.1 10*3/MM3 (ref 0–0.2)
BASOPHILS NFR BLD AUTO: 1 % (ref 0–1.5)
BILIRUB SERPL-MCNC: 0.3 MG/DL (ref 0–1.2)
BILIRUB UR QL STRIP: NEGATIVE
BUN SERPL-MCNC: 29 MG/DL (ref 8–23)
BUN/CREAT SERPL: 22 (ref 7–25)
CALCIUM SPEC-SCNC: 10.3 MG/DL (ref 8.6–10.5)
CHLORIDE SERPL-SCNC: 92 MMOL/L (ref 98–107)
CLARITY UR: ABNORMAL
CO2 SERPL-SCNC: 18 MMOL/L (ref 22–29)
COLOR UR: YELLOW
CREAT SERPL-MCNC: 1.32 MG/DL (ref 0.57–1)
D-LACTATE SERPL-SCNC: 2.2 MMOL/L (ref 0.5–2)
D-LACTATE SERPL-SCNC: 3 MMOL/L (ref 0.3–2)
DEPRECATED RDW RBC AUTO: 52.9 FL (ref 37–54)
DIGOXIN SERPL-MCNC: 2.2 NG/ML (ref 0.6–1.2)
EGFRCR SERPLBLD CKD-EPI 2021: 42.2 ML/MIN/1.73
EOSINOPHIL # BLD AUTO: 0.1 10*3/MM3 (ref 0–0.4)
EOSINOPHIL NFR BLD AUTO: 0.8 % (ref 0.3–6.2)
ERYTHROCYTE [DISTWIDTH] IN BLOOD BY AUTOMATED COUNT: 17.6 % (ref 12.3–15.4)
FLUAV SUBTYP SPEC NAA+PROBE: NOT DETECTED
FLUBV RNA ISLT QL NAA+PROBE: NOT DETECTED
GLOBULIN UR ELPH-MCNC: 3.2 GM/DL
GLUCOSE BLDC GLUCOMTR-MCNC: 200 MG/DL (ref 70–105)
GLUCOSE BLDC GLUCOMTR-MCNC: 356 MG/DL (ref 70–105)
GLUCOSE SERPL-MCNC: 357 MG/DL (ref 65–99)
GLUCOSE UR STRIP-MCNC: ABNORMAL MG/DL
HCT VFR BLD AUTO: 44.4 % (ref 34–46.6)
HGB BLD-MCNC: 14.9 G/DL (ref 12–15.9)
HGB UR QL STRIP.AUTO: ABNORMAL
HOLD SPECIMEN: NORMAL
HOLD SPECIMEN: NORMAL
HYALINE CASTS UR QL AUTO: ABNORMAL /LPF
KETONES UR QL STRIP: NEGATIVE
LEUKOCYTE ESTERASE UR QL STRIP.AUTO: ABNORMAL
LYMPHOCYTES # BLD AUTO: 4.9 10*3/MM3 (ref 0.7–3.1)
LYMPHOCYTES NFR BLD AUTO: 46.3 % (ref 19.6–45.3)
MCH RBC QN AUTO: 29 PG (ref 26.6–33)
MCHC RBC AUTO-ENTMCNC: 33.5 G/DL (ref 31.5–35.7)
MCV RBC AUTO: 86.4 FL (ref 79–97)
MONOCYTES # BLD AUTO: 0.6 10*3/MM3 (ref 0.1–0.9)
MONOCYTES NFR BLD AUTO: 5.3 % (ref 5–12)
NEUTROPHILS NFR BLD AUTO: 4.9 10*3/MM3 (ref 1.7–7)
NEUTROPHILS NFR BLD AUTO: 46.6 % (ref 42.7–76)
NITRITE UR QL STRIP: NEGATIVE
NRBC BLD AUTO-RTO: 0.2 /100 WBC (ref 0–0.2)
NT-PROBNP SERPL-MCNC: 251.4 PG/ML (ref 0–1800)
PH UR STRIP.AUTO: 8 [PH] (ref 5–8)
PLATELET # BLD AUTO: 353 10*3/MM3 (ref 140–450)
PMV BLD AUTO: 8 FL (ref 6–12)
POTASSIUM SERPL-SCNC: 4.1 MMOL/L (ref 3.5–5.2)
PROT SERPL-MCNC: 7.7 G/DL (ref 6–8.5)
PROT UR QL STRIP: ABNORMAL
RBC # BLD AUTO: 5.14 10*6/MM3 (ref 3.77–5.28)
RBC # UR STRIP: ABNORMAL /HPF
REF LAB TEST METHOD: ABNORMAL
SARS-COV-2 RNA RESP QL NAA+PROBE: NOT DETECTED
SODIUM SERPL-SCNC: 131 MMOL/L (ref 136–145)
SP GR UR STRIP: 1.02 (ref 1–1.03)
SQUAMOUS #/AREA URNS HPF: ABNORMAL /HPF
TROPONIN T SERPL HS-MCNC: 29 NG/L
UROBILINOGEN UR QL STRIP: ABNORMAL
WBC # UR STRIP: ABNORMAL /HPF
WBC NRBC COR # BLD AUTO: 10.6 10*3/MM3 (ref 3.4–10.8)
WHOLE BLOOD HOLD COAG: NORMAL
WHOLE BLOOD HOLD SPECIMEN: NORMAL

## 2023-12-01 PROCEDURE — 25010000002 MIDAZOLAM PER 1 MG

## 2023-12-01 PROCEDURE — 83605 ASSAY OF LACTIC ACID: CPT

## 2023-12-01 PROCEDURE — 36415 COLL VENOUS BLD VENIPUNCTURE: CPT

## 2023-12-01 PROCEDURE — 71045 X-RAY EXAM CHEST 1 VIEW: CPT

## 2023-12-01 PROCEDURE — G0378 HOSPITAL OBSERVATION PER HR: HCPCS

## 2023-12-01 PROCEDURE — 82948 REAGENT STRIP/BLOOD GLUCOSE: CPT

## 2023-12-01 PROCEDURE — 81001 URINALYSIS AUTO W/SCOPE: CPT | Performed by: EMERGENCY MEDICINE

## 2023-12-01 PROCEDURE — 93005 ELECTROCARDIOGRAM TRACING: CPT

## 2023-12-01 PROCEDURE — 70553 MRI BRAIN STEM W/O & W/DYE: CPT

## 2023-12-01 PROCEDURE — 85025 COMPLETE CBC W/AUTO DIFF WBC: CPT | Performed by: EMERGENCY MEDICINE

## 2023-12-01 PROCEDURE — 87636 SARSCOV2 & INF A&B AMP PRB: CPT | Performed by: EMERGENCY MEDICINE

## 2023-12-01 PROCEDURE — 25810000003 SODIUM CHLORIDE 0.9 % SOLUTION: Performed by: EMERGENCY MEDICINE

## 2023-12-01 PROCEDURE — 25010000002 CEFTRIAXONE PER 250 MG: Performed by: EMERGENCY MEDICINE

## 2023-12-01 PROCEDURE — 80162 ASSAY OF DIGOXIN TOTAL: CPT | Performed by: EMERGENCY MEDICINE

## 2023-12-01 PROCEDURE — 99285 EMERGENCY DEPT VISIT HI MDM: CPT

## 2023-12-01 PROCEDURE — 25010000002 GADOTERIDOL PER 1 ML: Performed by: EMERGENCY MEDICINE

## 2023-12-01 PROCEDURE — A9579 GAD-BASE MR CONTRAST NOS,1ML: HCPCS | Performed by: EMERGENCY MEDICINE

## 2023-12-01 PROCEDURE — 87086 URINE CULTURE/COLONY COUNT: CPT | Performed by: EMERGENCY MEDICINE

## 2023-12-01 PROCEDURE — 80053 COMPREHEN METABOLIC PANEL: CPT | Performed by: EMERGENCY MEDICINE

## 2023-12-01 PROCEDURE — 87040 BLOOD CULTURE FOR BACTERIA: CPT | Performed by: EMERGENCY MEDICINE

## 2023-12-01 PROCEDURE — 84484 ASSAY OF TROPONIN QUANT: CPT | Performed by: EMERGENCY MEDICINE

## 2023-12-01 PROCEDURE — 83880 ASSAY OF NATRIURETIC PEPTIDE: CPT | Performed by: EMERGENCY MEDICINE

## 2023-12-01 PROCEDURE — 80074 ACUTE HEPATITIS PANEL: CPT | Performed by: STUDENT IN AN ORGANIZED HEALTH CARE EDUCATION/TRAINING PROGRAM

## 2023-12-01 RX ORDER — LABETALOL HYDROCHLORIDE 5 MG/ML
10 INJECTION, SOLUTION INTRAVENOUS EVERY 4 HOURS PRN
Status: DISCONTINUED | OUTPATIENT
Start: 2023-12-01 | End: 2023-12-04 | Stop reason: HOSPADM

## 2023-12-01 RX ORDER — ASPIRIN 325 MG
325 TABLET ORAL DAILY
Status: DISCONTINUED | OUTPATIENT
Start: 2023-12-02 | End: 2023-12-03

## 2023-12-01 RX ORDER — SODIUM CHLORIDE 0.9 % (FLUSH) 0.9 %
10 SYRINGE (ML) INJECTION EVERY 12 HOURS SCHEDULED
Status: DISCONTINUED | OUTPATIENT
Start: 2023-12-01 | End: 2023-12-04 | Stop reason: HOSPADM

## 2023-12-01 RX ORDER — ONDANSETRON 2 MG/ML
4 INJECTION INTRAMUSCULAR; INTRAVENOUS EVERY 6 HOURS PRN
Status: DISCONTINUED | OUTPATIENT
Start: 2023-12-01 | End: 2023-12-04 | Stop reason: HOSPADM

## 2023-12-01 RX ORDER — NICOTINE POLACRILEX 4 MG
15 LOZENGE BUCCAL
Status: DISCONTINUED | OUTPATIENT
Start: 2023-12-01 | End: 2023-12-02 | Stop reason: SDUPTHER

## 2023-12-01 RX ORDER — ASPIRIN 81 MG/1
81 TABLET, CHEWABLE ORAL DAILY
Status: DISCONTINUED | OUTPATIENT
Start: 2023-12-02 | End: 2023-12-01

## 2023-12-01 RX ORDER — CLOPIDOGREL BISULFATE 75 MG/1
75 TABLET ORAL DAILY
Status: DISCONTINUED | OUTPATIENT
Start: 2023-12-02 | End: 2023-12-03

## 2023-12-01 RX ORDER — ATORVASTATIN CALCIUM 40 MG/1
80 TABLET, FILM COATED ORAL NIGHTLY
Status: DISCONTINUED | OUTPATIENT
Start: 2023-12-01 | End: 2023-12-02 | Stop reason: SDUPTHER

## 2023-12-01 RX ORDER — ACETAMINOPHEN 650 MG/1
650 SUPPOSITORY RECTAL EVERY 4 HOURS PRN
Status: DISCONTINUED | OUTPATIENT
Start: 2023-12-01 | End: 2023-12-04 | Stop reason: HOSPADM

## 2023-12-01 RX ORDER — ASPIRIN 300 MG/1
300 SUPPOSITORY RECTAL DAILY
Status: DISCONTINUED | OUTPATIENT
Start: 2023-12-02 | End: 2023-12-03

## 2023-12-01 RX ORDER — DEXTROSE MONOHYDRATE 25 G/50ML
10-50 INJECTION, SOLUTION INTRAVENOUS
Status: DISCONTINUED | OUTPATIENT
Start: 2023-12-01 | End: 2023-12-02 | Stop reason: SDUPTHER

## 2023-12-01 RX ORDER — BISACODYL 10 MG
10 SUPPOSITORY, RECTAL RECTAL DAILY PRN
Status: DISCONTINUED | OUTPATIENT
Start: 2023-12-01 | End: 2023-12-04 | Stop reason: HOSPADM

## 2023-12-01 RX ORDER — ACETAMINOPHEN 325 MG/1
650 TABLET ORAL EVERY 4 HOURS PRN
Status: DISCONTINUED | OUTPATIENT
Start: 2023-12-01 | End: 2023-12-04 | Stop reason: HOSPADM

## 2023-12-01 RX ORDER — INSULIN LISPRO 100 [IU]/ML
1-200 INJECTION, SOLUTION INTRAVENOUS; SUBCUTANEOUS
Status: DISCONTINUED | OUTPATIENT
Start: 2023-12-02 | End: 2023-12-02 | Stop reason: SDUPTHER

## 2023-12-01 RX ORDER — SODIUM CHLORIDE 0.9 % (FLUSH) 0.9 %
10 SYRINGE (ML) INJECTION AS NEEDED
Status: DISCONTINUED | OUTPATIENT
Start: 2023-12-01 | End: 2023-12-04 | Stop reason: HOSPADM

## 2023-12-01 RX ORDER — INSULIN LISPRO 100 [IU]/ML
1-200 INJECTION, SOLUTION INTRAVENOUS; SUBCUTANEOUS AS NEEDED
Status: DISCONTINUED | OUTPATIENT
Start: 2023-12-01 | End: 2023-12-02 | Stop reason: SDUPTHER

## 2023-12-01 RX ORDER — MIDAZOLAM HYDROCHLORIDE 1 MG/ML
INJECTION INTRAMUSCULAR; INTRAVENOUS
Status: COMPLETED
Start: 2023-12-01 | End: 2023-12-01

## 2023-12-01 RX ORDER — MIDAZOLAM HYDROCHLORIDE 1 MG/ML
2 INJECTION INTRAMUSCULAR; INTRAVENOUS ONCE
Status: COMPLETED | OUTPATIENT
Start: 2023-12-01 | End: 2023-12-01

## 2023-12-01 RX ORDER — IBUPROFEN 600 MG/1
1 TABLET ORAL
Status: DISCONTINUED | OUTPATIENT
Start: 2023-12-01 | End: 2023-12-02 | Stop reason: SDUPTHER

## 2023-12-01 RX ORDER — SODIUM CHLORIDE 9 MG/ML
40 INJECTION, SOLUTION INTRAVENOUS AS NEEDED
Status: DISCONTINUED | OUTPATIENT
Start: 2023-12-01 | End: 2023-12-04 | Stop reason: HOSPADM

## 2023-12-01 RX ADMIN — MIDAZOLAM HYDROCHLORIDE 2 MG: 1 INJECTION INTRAMUSCULAR; INTRAVENOUS at 19:49

## 2023-12-01 RX ADMIN — MIDAZOLAM 2 MG: 1 INJECTION INTRAMUSCULAR; INTRAVENOUS at 19:49

## 2023-12-01 RX ADMIN — SODIUM CHLORIDE 1000 ML: 9 INJECTION, SOLUTION INTRAVENOUS at 22:39

## 2023-12-01 RX ADMIN — GADOTERIDOL 15 ML: 279.3 INJECTION, SOLUTION INTRAVENOUS at 20:36

## 2023-12-01 RX ADMIN — CEFTRIAXONE 2000 MG: 2 INJECTION, POWDER, FOR SOLUTION INTRAMUSCULAR; INTRAVENOUS at 22:39

## 2023-12-02 ENCOUNTER — APPOINTMENT (OUTPATIENT)
Dept: CT IMAGING | Facility: HOSPITAL | Age: 73
DRG: 066 | End: 2023-12-02
Payer: MEDICARE

## 2023-12-02 ENCOUNTER — APPOINTMENT (OUTPATIENT)
Dept: ULTRASOUND IMAGING | Facility: HOSPITAL | Age: 73
DRG: 066 | End: 2023-12-02
Payer: MEDICARE

## 2023-12-02 ENCOUNTER — APPOINTMENT (OUTPATIENT)
Dept: CARDIOLOGY | Facility: HOSPITAL | Age: 73
DRG: 066 | End: 2023-12-02
Payer: MEDICARE

## 2023-12-02 LAB
ALBUMIN SERPL-MCNC: 4 G/DL (ref 3.5–5.2)
ALBUMIN/GLOB SERPL: 1.3 G/DL
ALP SERPL-CCNC: 113 U/L (ref 39–117)
ALT SERPL W P-5'-P-CCNC: 89 U/L (ref 1–33)
ANION GAP SERPL CALCULATED.3IONS-SCNC: 15 MMOL/L (ref 5–15)
AST SERPL-CCNC: 101 U/L (ref 1–32)
BH CV ECHO MEAS - ACS: 1.6 CM
BH CV ECHO MEAS - AO MAX PG: 8.4 MMHG
BH CV ECHO MEAS - AO MEAN PG: 4 MMHG
BH CV ECHO MEAS - AO V2 MAX: 145 CM/SEC
BH CV ECHO MEAS - AO V2 VTI: 24.5 CM
BH CV ECHO MEAS - AVA(I,D): 1.72 CM2
BH CV ECHO MEAS - EDV(CUBED): 74.1 ML
BH CV ECHO MEAS - EDV(MOD-SP4): 48.6 ML
BH CV ECHO MEAS - EF(MOD-BP): 57 %
BH CV ECHO MEAS - EF(MOD-SP4): 57 %
BH CV ECHO MEAS - ESV(CUBED): 24.4 ML
BH CV ECHO MEAS - ESV(MOD-SP4): 20.9 ML
BH CV ECHO MEAS - FS: 31 %
BH CV ECHO MEAS - IVS/LVPW: 1.29 CM
BH CV ECHO MEAS - IVSD: 0.9 CM
BH CV ECHO MEAS - LA DIMENSION: 3.5 CM
BH CV ECHO MEAS - LAT PEAK E' VEL: 5.1 CM/SEC
BH CV ECHO MEAS - LV DIASTOLIC VOL/BSA (35-75): 27.8 CM2
BH CV ECHO MEAS - LV MASS(C)D: 101.3 GRAMS
BH CV ECHO MEAS - LV MAX PG: 3.9 MMHG
BH CV ECHO MEAS - LV MEAN PG: 2 MMHG
BH CV ECHO MEAS - LV SYSTOLIC VOL/BSA (12-30): 12 CM2
BH CV ECHO MEAS - LV V1 MAX: 99 CM/SEC
BH CV ECHO MEAS - LV V1 VTI: 18.6 CM
BH CV ECHO MEAS - LVIDD: 4.2 CM
BH CV ECHO MEAS - LVIDS: 2.9 CM
BH CV ECHO MEAS - LVOT AREA: 2.27 CM2
BH CV ECHO MEAS - LVOT DIAM: 1.7 CM
BH CV ECHO MEAS - LVPWD: 0.7 CM
BH CV ECHO MEAS - MED PEAK E' VEL: 5.4 CM/SEC
BH CV ECHO MEAS - MR MAX PG: 36.7 MMHG
BH CV ECHO MEAS - MR MAX VEL: 303 CM/SEC
BH CV ECHO MEAS - MV A MAX VEL: 133 CM/SEC
BH CV ECHO MEAS - MV DEC SLOPE: 492 CM/SEC2
BH CV ECHO MEAS - MV DEC TIME: 0.28 SEC
BH CV ECHO MEAS - MV E MAX VEL: 109 CM/SEC
BH CV ECHO MEAS - MV E/A: 0.82
BH CV ECHO MEAS - MV MAX PG: 9.5 MMHG
BH CV ECHO MEAS - MV MEAN PG: 4 MMHG
BH CV ECHO MEAS - MV P1/2T: 60.7 MSEC
BH CV ECHO MEAS - MV V2 VTI: 33.7 CM
BH CV ECHO MEAS - MVA(P1/2T): 3.6 CM2
BH CV ECHO MEAS - MVA(VTI): 1.25 CM2
BH CV ECHO MEAS - PA ACC TIME: 0.11 SEC
BH CV ECHO MEAS - PA V2 MAX: 102 CM/SEC
BH CV ECHO MEAS - RAP SYSTOLE: 3 MMHG
BH CV ECHO MEAS - RV MAX PG: 4.1 MMHG
BH CV ECHO MEAS - RV V1 MAX: 101 CM/SEC
BH CV ECHO MEAS - RV V1 VTI: 20.1 CM
BH CV ECHO MEAS - SI(MOD-SP4): 15.8 ML/M2
BH CV ECHO MEAS - SV(LVOT): 42.2 ML
BH CV ECHO MEAS - SV(MOD-SP4): 27.7 ML
BH CV ECHO MEAS - TAPSE (>1.6): 1.79 CM
BH CV ECHO MEASUREMENTS AVERAGE E/E' RATIO: 20.76
BH CV ECHO SHUNT ASSESSMENT PERFORMED (HIDDEN SCRIPTING): 1
BH CV XLRA - TDI S': 14 CM/SEC
BILIRUB SERPL-MCNC: 0.2 MG/DL (ref 0–1.2)
BUN SERPL-MCNC: 26 MG/DL (ref 8–23)
BUN/CREAT SERPL: 19 (ref 7–25)
CALCIUM SPEC-SCNC: 9.6 MG/DL (ref 8.6–10.5)
CHLORIDE SERPL-SCNC: 97 MMOL/L (ref 98–107)
CHOLEST SERPL-MCNC: 271 MG/DL (ref 0–200)
CO2 SERPL-SCNC: 25 MMOL/L (ref 22–29)
CREAT SERPL-MCNC: 1.37 MG/DL (ref 0.57–1)
DEPRECATED RDW RBC AUTO: 54.3 FL (ref 37–54)
EGFRCR SERPLBLD CKD-EPI 2021: 40.3 ML/MIN/1.73
ERYTHROCYTE [DISTWIDTH] IN BLOOD BY AUTOMATED COUNT: 17.4 % (ref 12.3–15.4)
GLOBULIN UR ELPH-MCNC: 3.2 GM/DL
GLUCOSE BLDC GLUCOMTR-MCNC: 196 MG/DL (ref 70–105)
GLUCOSE BLDC GLUCOMTR-MCNC: 246 MG/DL (ref 70–105)
GLUCOSE BLDC GLUCOMTR-MCNC: 285 MG/DL (ref 70–105)
GLUCOSE BLDC GLUCOMTR-MCNC: 327 MG/DL (ref 70–105)
GLUCOSE SERPL-MCNC: 206 MG/DL (ref 65–99)
HAV IGM SERPL QL IA: NORMAL
HBA1C MFR BLD: 12.2 % (ref 4.8–5.6)
HBV CORE IGM SERPL QL IA: NORMAL
HBV SURFACE AG SERPL QL IA: NORMAL
HCT VFR BLD AUTO: 42 % (ref 34–46.6)
HCV AB SER DONR QL: NORMAL
HDLC SERPL-MCNC: 39 MG/DL (ref 40–60)
HGB BLD-MCNC: 13.8 G/DL (ref 12–15.9)
IRON 24H UR-MRATE: 43 MCG/DL (ref 37–145)
IRON SATN MFR SERPL: 12 % (ref 20–50)
LDLC SERPL CALC-MCNC: 126 MG/DL (ref 0–100)
LDLC/HDLC SERPL: 2.97 {RATIO}
LEFT ATRIUM VOLUME INDEX: 17 ML/M2
MCH RBC QN AUTO: 28.2 PG (ref 26.6–33)
MCHC RBC AUTO-ENTMCNC: 32.9 G/DL (ref 31.5–35.7)
MCV RBC AUTO: 85.5 FL (ref 79–97)
PA ADP PRP-ACNC: 208 PRU (ref 194–418)
PLATELET # BLD AUTO: 307 10*3/MM3 (ref 140–450)
PMV BLD AUTO: 7.7 FL (ref 6–12)
POTASSIUM SERPL-SCNC: 3.5 MMOL/L (ref 3.5–5.2)
PROT SERPL-MCNC: 7.2 G/DL (ref 6–8.5)
QT INTERVAL: 375 MS
QTC INTERVAL: 436 MS
RBC # BLD AUTO: 4.92 10*6/MM3 (ref 3.77–5.28)
SINUS: 2.7 CM
SODIUM SERPL-SCNC: 137 MMOL/L (ref 136–145)
TIBC SERPL-MCNC: 352 MCG/DL (ref 298–536)
TRANSFERRIN SERPL-MCNC: 236 MG/DL (ref 200–360)
TRIGL SERPL-MCNC: 581 MG/DL (ref 0–150)
TSH SERPL DL<=0.05 MIU/L-ACNC: 4.3 UIU/ML (ref 0.27–4.2)
VIT B12 BLD-MCNC: 1010 PG/ML (ref 211–946)
VLDLC SERPL-MCNC: 106 MG/DL (ref 5–40)
WBC NRBC COR # BLD AUTO: 8.9 10*3/MM3 (ref 3.4–10.8)

## 2023-12-02 PROCEDURE — 99221 1ST HOSP IP/OBS SF/LOW 40: CPT | Performed by: PSYCHIATRY & NEUROLOGY

## 2023-12-02 PROCEDURE — 25510000001 IOPAMIDOL PER 1 ML: Performed by: HOSPITALIST

## 2023-12-02 PROCEDURE — G0378 HOSPITAL OBSERVATION PER HR: HCPCS

## 2023-12-02 PROCEDURE — 93306 TTE W/DOPPLER COMPLETE: CPT | Performed by: INTERNAL MEDICINE

## 2023-12-02 PROCEDURE — 85027 COMPLETE CBC AUTOMATED: CPT | Performed by: STUDENT IN AN ORGANIZED HEALTH CARE EDUCATION/TRAINING PROGRAM

## 2023-12-02 PROCEDURE — 63710000001 INSULIN LISPRO (HUMAN) PER 5 UNITS: Performed by: STUDENT IN AN ORGANIZED HEALTH CARE EDUCATION/TRAINING PROGRAM

## 2023-12-02 PROCEDURE — 25010000002 CEFTRIAXONE PER 250 MG: Performed by: STUDENT IN AN ORGANIZED HEALTH CARE EDUCATION/TRAINING PROGRAM

## 2023-12-02 PROCEDURE — 63710000001 INSULIN GLARGINE PER 5 UNITS: Performed by: STUDENT IN AN ORGANIZED HEALTH CARE EDUCATION/TRAINING PROGRAM

## 2023-12-02 PROCEDURE — 63710000001 INSULIN LISPRO (HUMAN) PER 5 UNITS: Performed by: HOSPITALIST

## 2023-12-02 PROCEDURE — 82948 REAGENT STRIP/BLOOD GLUCOSE: CPT

## 2023-12-02 PROCEDURE — 70498 CT ANGIOGRAPHY NECK: CPT

## 2023-12-02 PROCEDURE — 84466 ASSAY OF TRANSFERRIN: CPT | Performed by: STUDENT IN AN ORGANIZED HEALTH CARE EDUCATION/TRAINING PROGRAM

## 2023-12-02 PROCEDURE — 80061 LIPID PANEL: CPT | Performed by: STUDENT IN AN ORGANIZED HEALTH CARE EDUCATION/TRAINING PROGRAM

## 2023-12-02 PROCEDURE — 83036 HEMOGLOBIN GLYCOSYLATED A1C: CPT | Performed by: STUDENT IN AN ORGANIZED HEALTH CARE EDUCATION/TRAINING PROGRAM

## 2023-12-02 PROCEDURE — 70496 CT ANGIOGRAPHY HEAD: CPT

## 2023-12-02 PROCEDURE — 76705 ECHO EXAM OF ABDOMEN: CPT

## 2023-12-02 PROCEDURE — 80053 COMPREHEN METABOLIC PANEL: CPT | Performed by: STUDENT IN AN ORGANIZED HEALTH CARE EDUCATION/TRAINING PROGRAM

## 2023-12-02 PROCEDURE — 83540 ASSAY OF IRON: CPT | Performed by: STUDENT IN AN ORGANIZED HEALTH CARE EDUCATION/TRAINING PROGRAM

## 2023-12-02 PROCEDURE — 85576 BLOOD PLATELET AGGREGATION: CPT | Performed by: PSYCHIATRY & NEUROLOGY

## 2023-12-02 PROCEDURE — 82746 ASSAY OF FOLIC ACID SERUM: CPT | Performed by: PSYCHIATRY & NEUROLOGY

## 2023-12-02 PROCEDURE — 84443 ASSAY THYROID STIM HORMONE: CPT | Performed by: STUDENT IN AN ORGANIZED HEALTH CARE EDUCATION/TRAINING PROGRAM

## 2023-12-02 PROCEDURE — 63710000001 INSULIN GLARGINE PER 5 UNITS: Performed by: HOSPITALIST

## 2023-12-02 PROCEDURE — 36415 COLL VENOUS BLD VENIPUNCTURE: CPT | Performed by: STUDENT IN AN ORGANIZED HEALTH CARE EDUCATION/TRAINING PROGRAM

## 2023-12-02 PROCEDURE — 93306 TTE W/DOPPLER COMPLETE: CPT

## 2023-12-02 PROCEDURE — 82607 VITAMIN B-12: CPT | Performed by: STUDENT IN AN ORGANIZED HEALTH CARE EDUCATION/TRAINING PROGRAM

## 2023-12-02 RX ORDER — MECLIZINE HYDROCHLORIDE 25 MG/1
25 TABLET ORAL 3 TIMES DAILY PRN
Status: DISCONTINUED | OUTPATIENT
Start: 2023-12-02 | End: 2023-12-04 | Stop reason: HOSPADM

## 2023-12-02 RX ORDER — PANTOPRAZOLE SODIUM 40 MG/1
40 TABLET, DELAYED RELEASE ORAL DAILY
Status: DISCONTINUED | OUTPATIENT
Start: 2023-12-02 | End: 2023-12-04 | Stop reason: HOSPADM

## 2023-12-02 RX ORDER — CYCLOBENZAPRINE HCL 10 MG
10 TABLET ORAL 3 TIMES DAILY PRN
Status: DISCONTINUED | OUTPATIENT
Start: 2023-12-02 | End: 2023-12-04 | Stop reason: HOSPADM

## 2023-12-02 RX ORDER — INSULIN LISPRO 100 [IU]/ML
1-200 INJECTION, SOLUTION INTRAVENOUS; SUBCUTANEOUS AS NEEDED
Status: DISCONTINUED | OUTPATIENT
Start: 2023-12-02 | End: 2023-12-04 | Stop reason: HOSPADM

## 2023-12-02 RX ORDER — DEXTROSE MONOHYDRATE 25 G/50ML
10-50 INJECTION, SOLUTION INTRAVENOUS
Status: DISCONTINUED | OUTPATIENT
Start: 2023-12-02 | End: 2023-12-04 | Stop reason: HOSPADM

## 2023-12-02 RX ORDER — DIGOXIN 250 MCG
0.25 TABLET ORAL DAILY
Status: DISCONTINUED | OUTPATIENT
Start: 2023-12-02 | End: 2023-12-02

## 2023-12-02 RX ORDER — LEVOTHYROXINE SODIUM 0.05 MG/1
50 TABLET ORAL
Status: DISCONTINUED | OUTPATIENT
Start: 2023-12-03 | End: 2023-12-04 | Stop reason: HOSPADM

## 2023-12-02 RX ORDER — CLOPIDOGREL BISULFATE 75 MG/1
75 TABLET ORAL DAILY
Status: DISCONTINUED | OUTPATIENT
Start: 2023-12-02 | End: 2023-12-02 | Stop reason: SDUPTHER

## 2023-12-02 RX ORDER — ALPRAZOLAM 0.25 MG/1
0.12 TABLET ORAL 2 TIMES DAILY
Status: DISCONTINUED | OUTPATIENT
Start: 2023-12-02 | End: 2023-12-04 | Stop reason: HOSPADM

## 2023-12-02 RX ORDER — CARVEDILOL 6.25 MG/1
6.25 TABLET ORAL 2 TIMES DAILY WITH MEALS
Status: DISCONTINUED | OUTPATIENT
Start: 2023-12-02 | End: 2023-12-04 | Stop reason: HOSPADM

## 2023-12-02 RX ORDER — TOPIRAMATE 25 MG/1
25 TABLET ORAL 2 TIMES DAILY
Status: DISCONTINUED | OUTPATIENT
Start: 2023-12-02 | End: 2023-12-04 | Stop reason: HOSPADM

## 2023-12-02 RX ORDER — TRIAMTERENE AND HYDROCHLOROTHIAZIDE 75; 50 MG/1; MG/1
1 TABLET ORAL DAILY
Status: DISCONTINUED | OUTPATIENT
Start: 2023-12-03 | End: 2023-12-04 | Stop reason: HOSPADM

## 2023-12-02 RX ORDER — MONTELUKAST SODIUM 10 MG/1
10 TABLET ORAL NIGHTLY
Status: DISCONTINUED | OUTPATIENT
Start: 2023-12-02 | End: 2023-12-04 | Stop reason: HOSPADM

## 2023-12-02 RX ORDER — INSULIN LISPRO 100 [IU]/ML
1-200 INJECTION, SOLUTION INTRAVENOUS; SUBCUTANEOUS
Status: DISCONTINUED | OUTPATIENT
Start: 2023-12-02 | End: 2023-12-04 | Stop reason: HOSPADM

## 2023-12-02 RX ORDER — ROSUVASTATIN CALCIUM 10 MG/1
20 TABLET, COATED ORAL NIGHTLY
Status: DISCONTINUED | OUTPATIENT
Start: 2023-12-02 | End: 2023-12-04 | Stop reason: HOSPADM

## 2023-12-02 RX ORDER — PAROXETINE HYDROCHLORIDE 20 MG/1
20 TABLET, FILM COATED ORAL DAILY
Status: DISCONTINUED | OUTPATIENT
Start: 2023-12-02 | End: 2023-12-04 | Stop reason: HOSPADM

## 2023-12-02 RX ORDER — IBUPROFEN 600 MG/1
1 TABLET ORAL
Status: DISCONTINUED | OUTPATIENT
Start: 2023-12-02 | End: 2023-12-04 | Stop reason: HOSPADM

## 2023-12-02 RX ORDER — ATORVASTATIN CALCIUM 40 MG/1
80 TABLET, FILM COATED ORAL DAILY
Status: DISCONTINUED | OUTPATIENT
Start: 2023-12-02 | End: 2023-12-02 | Stop reason: SDUPTHER

## 2023-12-02 RX ORDER — ATORVASTATIN CALCIUM 80 MG/1
80 TABLET, FILM COATED ORAL DAILY
Status: ON HOLD | COMMUNITY
End: 2023-12-02

## 2023-12-02 RX ORDER — NICOTINE POLACRILEX 4 MG
15 LOZENGE BUCCAL
Status: DISCONTINUED | OUTPATIENT
Start: 2023-12-02 | End: 2023-12-04 | Stop reason: HOSPADM

## 2023-12-02 RX ADMIN — INSULIN LISPRO 8 UNITS: 100 INJECTION, SOLUTION INTRAVENOUS; SUBCUTANEOUS at 12:19

## 2023-12-02 RX ADMIN — ROSUVASTATIN 20 MG: 10 TABLET, FILM COATED ORAL at 20:56

## 2023-12-02 RX ADMIN — PAROXETINE HYDROCHLORIDE 20 MG: 20 TABLET, FILM COATED ORAL at 16:33

## 2023-12-02 RX ADMIN — INSULIN LISPRO 9 UNITS: 100 INJECTION, SOLUTION INTRAVENOUS; SUBCUTANEOUS at 17:59

## 2023-12-02 RX ADMIN — INSULIN LISPRO 4 UNITS: 100 INJECTION, SOLUTION INTRAVENOUS; SUBCUTANEOUS at 21:02

## 2023-12-02 RX ADMIN — Medication 10 ML: at 00:56

## 2023-12-02 RX ADMIN — INSULIN LISPRO 5 UNITS: 100 INJECTION, SOLUTION INTRAVENOUS; SUBCUTANEOUS at 09:38

## 2023-12-02 RX ADMIN — MONTELUKAST 10 MG: 10 TABLET, FILM COATED ORAL at 20:57

## 2023-12-02 RX ADMIN — ATORVASTATIN CALCIUM 80 MG: 40 TABLET, FILM COATED ORAL at 00:55

## 2023-12-02 RX ADMIN — CEFTRIAXONE 2000 MG: 2 INJECTION, POWDER, FOR SOLUTION INTRAMUSCULAR; INTRAVENOUS at 20:50

## 2023-12-02 RX ADMIN — ALPRAZOLAM 0.12 MG: 0.25 TABLET ORAL at 20:56

## 2023-12-02 RX ADMIN — ASPIRIN 325 MG ORAL TABLET 325 MG: 325 PILL ORAL at 09:38

## 2023-12-02 RX ADMIN — Medication 10 MG: at 16:38

## 2023-12-02 RX ADMIN — IOPAMIDOL 100 ML: 755 INJECTION, SOLUTION INTRAVENOUS at 17:12

## 2023-12-02 RX ADMIN — Medication 10 ML: at 09:48

## 2023-12-02 RX ADMIN — Medication 10 ML: at 21:00

## 2023-12-02 RX ADMIN — INSULIN GLARGINE 21 UNITS: 100 INJECTION, SOLUTION SUBCUTANEOUS at 21:01

## 2023-12-02 RX ADMIN — TOPIRAMATE 25 MG: 25 TABLET, FILM COATED ORAL at 20:57

## 2023-12-02 RX ADMIN — INSULIN LISPRO 1 UNITS: 100 INJECTION, SOLUTION INTRAVENOUS; SUBCUTANEOUS at 00:55

## 2023-12-02 RX ADMIN — PANTOPRAZOLE SODIUM 40 MG: 40 TABLET, DELAYED RELEASE ORAL at 16:33

## 2023-12-02 RX ADMIN — CARVEDILOL 6.25 MG: 6.25 TABLET, FILM COATED ORAL at 18:05

## 2023-12-02 RX ADMIN — Medication 10 MG: at 09:38

## 2023-12-02 RX ADMIN — INSULIN GLARGINE 19 UNITS: 100 INJECTION, SOLUTION SUBCUTANEOUS at 00:56

## 2023-12-02 RX ADMIN — CLOPIDOGREL BISULFATE 75 MG: 75 TABLET ORAL at 09:38

## 2023-12-02 RX ADMIN — Medication 10 MG: at 20:47

## 2023-12-02 NOTE — PROGRESS NOTES
[facility] Daily Progress Note    Patient Name: Lula Burnett  : 1950  MRN: [id number]  Primary Care Physician:  Val Irvin MD  Date of admission: 2023      Subjective      Chief Complaint: left pontine stroke.    Subjective   Patient Reports still feeling weak, generalized in nature. Denies for any nausea or vomiting.    Review of Systems   All other systems reviewed and are negative.         Objective      Vitals:   Temp:  [97.6 °F (36.4 °C)-98.8 °F (37.1 °C)] 98.3 °F (36.8 °C)  Heart Rate:  [76-99] 80  Resp:  [16-22] 16  BP: (152-190)/(63-96) 152/75    Physical Exam  Vitals and nursing note reviewed.   Constitutional:       General: She is not in acute distress.     Appearance: Normal appearance. She is well-developed. She is not ill-appearing, toxic-appearing or diaphoretic.   HENT:      Head: Normocephalic and atraumatic.      Right Ear: Ear canal and external ear normal.      Left Ear: Ear canal and external ear normal.      Nose: Nose normal. No congestion or rhinorrhea.      Mouth/Throat:      Mouth: Mucous membranes are moist.      Pharynx: No oropharyngeal exudate.   Eyes:      General: No scleral icterus.        Right eye: No discharge.         Left eye: No discharge.      Extraocular Movements: Extraocular movements intact.      Conjunctiva/sclera: Conjunctivae normal.      Pupils: Pupils are equal, round, and reactive to light.   Neck:      Thyroid: No thyromegaly.      Vascular: No carotid bruit or JVD.      Trachea: No tracheal deviation.   Cardiovascular:      Rate and Rhythm: Normal rate and regular rhythm.      Pulses: Normal pulses.      Heart sounds: Normal heart sounds. No murmur heard.     No friction rub. No gallop.   Pulmonary:      Effort: Pulmonary effort is normal. No respiratory distress.      Breath sounds: Normal breath sounds. No stridor. No wheezing, rhonchi or rales.   Chest:      Chest wall: No tenderness.    Abdominal:      General: Bowel sounds are normal. There is no distension.      Palpations: Abdomen is soft. There is no mass.      Tenderness: There is no abdominal tenderness. There is no guarding or rebound.      Hernia: No hernia is present.   Musculoskeletal:         General: No swelling, tenderness, deformity or signs of injury. Normal range of motion.      Cervical back: Normal range of motion and neck supple. No rigidity. No muscular tenderness.      Right lower leg: No edema.      Left lower leg: No edema.   Lymphadenopathy:      Cervical: No cervical adenopathy.   Skin:     General: Skin is warm and dry.      Coloration: Skin is not jaundiced or pale.      Findings: No bruising, erythema or rash.   Neurological:      General: No focal deficit present.      Mental Status: She is alert and oriented to person, place, and time. Mental status is at baseline.      Cranial Nerves: No cranial nerve deficit.      Sensory: No sensory deficit.      Motor: No weakness or abnormal muscle tone.      Coordination: Coordination normal.   Psychiatric:         Mood and Affect: Mood normal.         Behavior: Behavior normal.         Thought Content: Thought content normal.         Judgment: Judgment normal.             Result Review    Result Review:  I have personally reviewed the results from the time of this admission to 12/2/2023 13:21 EST and agree with these findings:  [x]  Laboratory  [x]  Microbiology  [x]  Radiology  [x]  EKG/Telemetry   []  Cardiology/Vascular   []  Pathology  []  Old records  []  Other:  Most notable findings include:           Assessment & Plan      Brief Patient Summary:  Lula Burnett is a 73 y.o. female who       aspirin, 325 mg, Oral, Daily   Or  aspirin, 300 mg, Rectal, Daily  atorvastatin, 80 mg, Oral, Nightly  cefTRIAXone, 2,000 mg, Intravenous, Q24H  clopidogrel, 75 mg, Oral, Daily  insulin glargine, 1-200 Units, Subcutaneous, Nightly - Glucommander  insulin lispro, 1-200 Units,  Subcutaneous, 4x Daily With Meals & Nightly  sodium chloride, 10 mL, Intravenous, Q12H             Active Hospital Problems:  Active Hospital Problems    Diagnosis     **Left pontine stroke     UTI (urinary tract infection), bacterial     Transaminitis     Memory impairment     Paroxysmal atrial fibrillation     Class 1 obesity in adult     Diabetes     Diabetic neuropathy     GERD (gastroesophageal reflux disease)     Hypertension     Hypothyroidism     Mitral stenosis     Mixed hyperlipidemia     Anxiety     Depression      Plan:     Stroke/TIA  -Reviewed imaging   -EKG reviewed  -ECHO  -TSH,  Lipid panel, A1C, Vit B12  -Accu-Checks every 6  -Neuro checks q2  -NIH q shift  -MRI brain positive for acute stroke.  -PT/OT eval  -NPO pending, swallow study  -Continue/start statin  -Continue ASA  -Avoid hydralazine for BP control  -No hypotonic fluids  -Neurology consult ... Awaited input.      UTI  -UA reviewed  -Continue Traxam     Transaminitis  -Hepatitis panel  -Iron studies  -US liver  -Repeat LFTs in am     Diabetes mellitus type 2, chronic  -A1C  --Hold home oral diabetes medications for now  -Start SSI  -Accu-checks every 6 hours  -Diabetic educator due to elevated blood sugar on admission     Chronic conditions: Hypertension, hyperlipidemia, hypothyroid, paroxysmal atrial fibrillation  - Continue home medications as appropriate  - Monitor routine labs and vital signs     Medical Intervention Limits: NO intubation (DNI)  Code Status (Patient has no pulse and is not breathing): No CPR (Do Not Attempt to Resuscitate)  Medical Interventions (Patient has pulse or is breathing): Limited Support        Nutrition:   NPO Diet NPO Type: Strict NPO      DVT prophylaxis:  Mechanical DVT prophylaxis orders are present.       DVT prophylaxis:  Mechanical DVT prophylaxis orders are present.    CODE STATUS:    Medical Intervention Limits: NO intubation (DNI)  Code Status (Patient has no pulse and is not breathing): No CPR (Do  Not Attempt to Resuscitate)  Medical Interventions (Patient has pulse or is breathing): Limited Support      Disposition:  I expect patient to be discharged as per clinical course.    This patient has been examined wearing appropriate Personal Protective Equipment and discussed with hospital infection control department. 12/02/23      Electronically signed by Taco Oakes MD, 12/02/23, 13:21 EST.  [facility] Hospitalist Team

## 2023-12-02 NOTE — ED PROVIDER NOTES
Subjective   History of Present Illness  73-year-old female complaining of persistent weakness.  She states she has been more weak on the left side than right side but she has had weakness on both sides in the last 4 to 5 days.  She states it was more consistently left-sided 2 days ago.  The patient states she has felt tired and a little confused recently and has had subjective fever as well as chills.  She reports that she has had no burning with urination she denies recent cough or congestion.  She states she has a history of chronic dizziness and has to take meclizine daily she states that it has not really helped much in the last 2 days.  She reports that she has had no associated headache.  She denies neck pain or stiffness.      Review of Systems   Constitutional:  Positive for chills and fatigue.   HENT:  Negative for sore throat.    Eyes:  Negative for discharge.   Respiratory:  Negative for cough.    Gastrointestinal:  Negative for nausea.   Genitourinary:  Negative for dysuria, frequency and hematuria.   Neurological:  Positive for weakness and light-headedness. Negative for speech difficulty, numbness and headaches.   All other systems reviewed and are negative.      Past Medical History:   Diagnosis Date    Anxiety     CVA (cerebral vascular accident)     x3    Depression     Diabetes     Diabetic neuropathy     GERD (gastroesophageal reflux disease)     Hypertension     Hypothyroidism     Mitral stenosis     Peptic ulceration     PONV (postoperative nausea and vomiting)        Allergies   Allergen Reactions    Codeine GI Intolerance       Past Surgical History:   Procedure Laterality Date    CARDIAC CATHETERIZATION      CARDIAC CATHETERIZATION N/A 02/07/2022    Procedure: Coronary angiography;  Surgeon: Eugene Lundy MD;  Location: [facility] INVASIVE LOCATION;  Service: Cardiovascular;  Laterality: N/A;    CARDIAC CATHETERIZATION N/A 02/07/2022    Procedure: Left heart cath;  Surgeon: Nikkie  Eugene MOJICA MD;  Location:  [facility] CATH INVASIVE LOCATION;  Service: Cardiovascular;  Laterality: N/A;    CARDIAC CATHETERIZATION N/A 2022    Procedure: Left ventriculography;  Surgeon: Eugene Lundy MD;  Location:  [facility] CATH INVASIVE LOCATION;  Service: Cardiovascular;  Laterality: N/A;    CARDIAC ELECTROPHYSIOLOGY PROCEDURE N/A 2022    Procedure: LOOP INSERTION linq;  Surgeon: Eugene Lundy MD;  [facility];  Service: Cardiovascular;  Laterality: N/A;    CHOLECYSTECTOMY  2000    HYSTERECTOMY         Family History   Problem Relation Age of Onset    Heart failure Mother     Lung cancer Father     Stroke Sister     Breast cancer Neg Hx     Ovarian cancer Neg Hx        Social History     Socioeconomic History    Marital status:    Tobacco Use    Smoking status: Former     Packs/day: 1.00     Years: 20.00     Additional pack years: 0.00     Total pack years: 20.00     Types: Cigarettes     Start date:      Quit date: 2011     Years since quittin.8    Smokeless tobacco: Never    Tobacco comments:     Quit around    Vaping Use    Vaping Use: Never used   Substance and Sexual Activity    Alcohol use: Never    Drug use: Never    Sexual activity: Not Currently       No reported safety issues    Objective   Physical Exam  Alert Reasnor Coma Scale 15 NIH 3   HEENT: Pupils equal and reactive to light. Conjunctivae are not injected. Normal tympanic membranes. Oropharynx and nares are normal.   Neck: Supple. Midline trachea. No JVD. No goiter.   Chest: Clear and equal breath sounds bilaterally, regular rate and rhythm without murmur or rub.   Abdomen: Positive bowel sounds, nontender, nondistended. No rebound or peritoneal signs. No CVA tenderness.   Extremities/neuro patient is able to move her left side against gravity there is no drift identified cranial nerves are intact and symmetrical.  The patient's dizziness is not exacerbated by rapid  head movements no clubbing. cyanosis or edema. Motor sensory exam is normal. The full range of motion is intact gait was not assessed   Skin: Warm and dry, no rashes or petechia.   Lymphatic: No regional lymphadenopathy. No calf pain, swelling or Homans sign    Procedures           ED Course                                   Labs Reviewed   COMPREHENSIVE METABOLIC PANEL - Abnormal; Notable for the following components:       Result Value    Glucose 357 (*)     BUN 29 (*)     Creatinine 1.32 (*)     Sodium 131 (*)     Chloride 92 (*)     CO2 18.0 (*)     ALT (SGPT) 110 (*)     AST (SGOT) 145 (*)     Alkaline Phosphatase 129 (*)     Anion Gap 21.0 (*)     eGFR 42.2 (*)     All other components within normal limits    Narrative:     GFR Normal >60  Chronic Kidney Disease <60  Kidney Failure <15    The GFR formula is only valid for adults with stable renal function between ages 18 and 70.   URINALYSIS W/ CULTURE IF INDICATED - Abnormal; Notable for the following components:    Appearance, UA Cloudy (*)     Glucose,  mg/dL (2+) (*)     Blood, UA Small (1+) (*)     Protein,  mg/dL (2+) (*)     Leuk Esterase, UA Moderate (2+) (*)     All other components within normal limits    Narrative:     In absence of clinical symptoms, the presence of pyuria, bacteria, and/or nitrites on the urinalysis result does not correlate with infection.   SINGLE HSTROPONIN T - Abnormal; Notable for the following components:    HS Troponin T 29 (*)     All other components within normal limits    Narrative:     High Sensitive Troponin T Reference Range:  <14.0 ng/L- Negative Female for AMI  <22.0 ng/L- Negative Male for AMI  >=14 - Abnormal Female indicating possible myocardial injury.  >=22 - Abnormal Male indicating possible myocardial injury.   Clinicians would have to utilize clinical acumen, EKG, Troponin, and serial changes to determine if it is an Acute Myocardial Infarction or myocardial injury due to an underlying chronic  condition.        CBC WITH AUTO DIFFERENTIAL - Abnormal; Notable for the following components:    RDW 17.6 (*)     Lymphocyte % 46.3 (*)     Lymphocytes, Absolute 4.90 (*)     All other components within normal limits   DIGOXIN LEVEL - Abnormal; Notable for the following components:    Digoxin 2.20 (*)     All other components within normal limits   URINALYSIS, MICROSCOPIC ONLY - Abnormal; Notable for the following components:    WBC, UA Too Numerous to Count (*)     Bacteria, UA 1+ (*)     Squamous Epithelial Cells, UA 3-6 (*)     All other components within normal limits   POCT GLUCOSE FINGERSTICK - Abnormal; Notable for the following components:    Glucose 356 (*)     All other components within normal limits   POC LACTATE - Abnormal; Notable for the following components:    Lactate 3.0 (*)     All other components within normal limits   COVID-19 AND FLU A/B, NP SWAB IN TRANSPORT MEDIA 1 HR TAT - Normal    Narrative:     Fact sheet for providers: https://www.fda.gov/media/473875/download    Fact sheet for patients: https://www.fda.gov/media/605437/download    Test performed by PCR.   BNP (IN-HOUSE) - Normal    Narrative:     This assay is used as an aid in the diagnosis of individuals suspected of having heart failure. It can be used as an aid in the diagnosis of acute decompensated heart failure (ADHF) in patients presenting with signs and symptoms of ADHF to the emergency department (ED). In addition, NT-proBNP of <300 pg/mL indicates ADHF is not likely.    Age Range Result Interpretation  NT-proBNP Concentration (pg/mL:      <50             Positive            >450                   Gray                 300-450                    Negative             <300    50-75           Positive            >900                  Gray                300-900                  Negative            <300      >75             Positive            >1800                  Gray                300-1800                  Negative             <300   BLOOD CULTURE   BLOOD CULTURE   URINE CULTURE   RAINBOW DRAW    Narrative:     The following orders were created for panel order [place] Draw.  Procedure                               Abnormality         Status                     ---------                               -----------         ------                     Green Top (Gel)[[id number]]                                  Final result               Lavender Top[[id number]]                                     Final result               Gold Top - SST[[id number]]                                   Final result               Light Blue Top[[id number]]                                   Final result                 Please view results for these tests on the individual orders.   LACTIC ACID, REFLEX   POC LACTATE   GREEN TOP   LAVENDER TOP   GOLD TOP - SST   LIGHT BLUE TOP   CBC AND DIFFERENTIAL    Narrative:     The following orders were created for panel order CBC & Differential.  Procedure                               Abnormality         Status                     ---------                               -----------         ------                     CBC Auto Differential[[id number]]        Abnormal            Final result                 Please view results for these tests on the individual orders.     Medications   cefTRIAXone (ROCEPHIN) 2,000 mg in sodium chloride 0.9 % 100 mL IVPB (has no administration in time range)   sodium chloride 0.9 % bolus 1,000 mL (has no administration in time range)   aspirin chewable tablet 81 mg (has no administration in time range)   clopidogrel (PLAVIX) tablet 75 mg (has no administration in time range)   midazolam (VERSED) injection 2 mg (2 mg Intravenous Given 12/1/23 1949)   gadoteridol (PROHANCE) injection 15 mL (15 mL Intravenous Given 12/1/23 2036)     MRI Brain With & Without Contrast    Result Date: 12/1/2023  Impression: 1. Small 4 mm focus of restricted diffusion within the dorsal aspect of the jose near the left  cerebellar peduncles likely representing a small acute or subacute infarct given patient's symptoms. 2. Encephalomalacia within the right occipital lobe corresponding to an old chronic right PCA territory infarct. 3. Mild periventricular white matter T2/FLAIR hyperintense signal changes likely representing sequelae of chronic small vessel ischemic disease. Findings called to Dr. Person at 9:23 p.m. on 12/1/2023. Electronically Signed: Davis Dubose  12/1/2023 9:23 PM EST  Workstation ID: [id number] [employer]    XR Chest 1 View    Result Date: 12/1/2023  Impression: No acute process identified Electronically Signed: Dashawn Todd MD  12/1/2023 6:04 PM CST  Workstation ID: [id number]             Medical Decision Making  The patient has stable ER course.  Patient was hydrated and given ceftriaxone.  The patient was not given a sepsis bolus due to elevated systolic pressures.  The patient was hemodynamically stable in the emergency department the case discussed with the hospitalist provider the patient was agreeable to this plan of treatment    Amount and/or Complexity of Data Reviewed  Labs: ordered. Decision-making details documented in ED Course.  Radiology: ordered and independent interpretation performed.  ECG/medicine tests: ordered and independent interpretation performed.     Details: Sinus rhythm with left anterior fascicular block LVH with repolarization abnormalities there is no comparison  Discussion of management or test interpretation with external provider(s): Case was discussed with hospitalist practitioner    Risk  OTC drugs.  Prescription drug management.  Decision regarding hospitalization.  Risk Details: Scope progression of neurologic symptoms risk of progression to bacteremia/sepsis    Critical Care  Total time providing critical care: 35 minutes (Please note that 35 minutes were spent with the care and stabilization of this patient for critical care time.  This is exclusive of any time spent on  procedures performed)        Final diagnoses:   Left pontine stroke   Imbalance due to old stroke   Acute urinary tract infection   Altered mental status, unspecified altered mental status type       ED Disposition  ED Disposition       ED Disposition   Decision to Admit    Condition   --    Comment   Level of Care: Telemetry [5]   Diagnosis: Left pontine stroke [[id number]]   Admitting Physician: ALE BRICENO [[id number]]   Attending Physician: ALE BRICENO [[id number]]                 No follow-up provider specified.       Medication List      No changes were made to your prescriptions during this visit.            Ash Person MD  12/01/23 [id number]

## 2023-12-02 NOTE — ED NOTES
Nursing report ED to floor  Lula Burnett  73 y.o.  female    HPI:   Chief Complaint   Patient presents with    Cerebrovascular Accident       Admitting doctor:   Judith Ball MD    Admitting diagnosis:   The primary encounter diagnosis was Left pontine stroke. Diagnoses of Imbalance due to old stroke, Acute urinary tract infection, and Altered mental status, unspecified altered mental status type were also pertinent to this visit.    Code status:   Current Code Status       Date Active Code Status Order ID Comments User Context       Not on file            Allergies:   Codeine    Isolation:  Enhanced Droplet/Contact      Fall Risk:  Fall Risk Assessment was completed, and patient is at moderate risk for falls.   Predictive Model Details         17 (Low) Factor Value    Calculated 12/1/2023 23:01 Age 73    Risk of Fall Model Musculoskeletal Assessment WDL     Active Peripheral IV Present     Imaging order in this encounter Present     Respiratory Rate 22     Skin Assessment WDL     Financial Class Other     Magnesium not on file     Number of Distinct Medication Classes administered 4     Drug Use No     Tobacco Use Quit     Chloride 92 mmol/L     Edwin Scale not on file     Creatinine 1.32 mg/dL     Peripheral Vascular Assessment WDL      U/L     Diastolic BP 82     Gastrointestinal Assessment WDL     Cardiac Assessment WDL     Days after Admission 0.209     Calcium 10.3 mg/dL     Potassium 4.1 mmol/L     Albumin 4.5 g/dL     Total Bilirubin 0.3 mg/dL         Weight:       12/01/23  1757   Weight: 76.2 kg (168 lb)       Intake and Output  No intake or output data in the 24 hours ending 12/01/23 2301    Diet:        Most recent vitals:   Vitals:    12/01/23 2117 12/01/23 2132 12/01/23 2142 12/01/23 2230   BP:  (!) 181/79  177/82   Pulse:  90  99   Resp:       Temp:       TempSrc:       SpO2: 97% 90% 92% 97%   Weight:       Height:           Active LDAs/IV Access:   Lines, Drains & Airways        Active LDAs       Name Placement date Placement time Site Days    Peripheral IV 12/01/23 1817 Anterior;Left Forearm 12/01/23 1817  Forearm  less than 1                    Skin Condition:   Skin Assessments (last day)       None             Labs (abnormal labs have a star):   Labs Reviewed   COMPREHENSIVE METABOLIC PANEL - Abnormal; Notable for the following components:       Result Value    Glucose 357 (*)     BUN 29 (*)     Creatinine 1.32 (*)     Sodium 131 (*)     Chloride 92 (*)     CO2 18.0 (*)     ALT (SGPT) 110 (*)     AST (SGOT) 145 (*)     Alkaline Phosphatase 129 (*)     Anion Gap 21.0 (*)     eGFR 42.2 (*)     All other components within normal limits    Narrative:     GFR Normal >60  Chronic Kidney Disease <60  Kidney Failure <15    The GFR formula is only valid for adults with stable renal function between ages 18 and 70.   URINALYSIS W/ CULTURE IF INDICATED - Abnormal; Notable for the following components:    Appearance, UA Cloudy (*)     Glucose,  mg/dL (2+) (*)     Blood, UA Small (1+) (*)     Protein,  mg/dL (2+) (*)     Leuk Esterase, UA Moderate (2+) (*)     All other components within normal limits    Narrative:     In absence of clinical symptoms, the presence of pyuria, bacteria, and/or nitrites on the urinalysis result does not correlate with infection.   SINGLE HSTROPONIN T - Abnormal; Notable for the following components:    HS Troponin T 29 (*)     All other components within normal limits    Narrative:     High Sensitive Troponin T Reference Range:  <14.0 ng/L- Negative Female for AMI  <22.0 ng/L- Negative Male for AMI  >=14 - Abnormal Female indicating possible myocardial injury.  >=22 - Abnormal Male indicating possible myocardial injury.   Clinicians would have to utilize clinical acumen, EKG, Troponin, and serial changes to determine if it is an Acute Myocardial Infarction or myocardial injury due to an underlying chronic condition.        CBC WITH AUTO DIFFERENTIAL -  Abnormal; Notable for the following components:    RDW 17.6 (*)     Lymphocyte % 46.3 (*)     Lymphocytes, Absolute 4.90 (*)     All other components within normal limits   DIGOXIN LEVEL - Abnormal; Notable for the following components:    Digoxin 2.20 (*)     All other components within normal limits   URINALYSIS, MICROSCOPIC ONLY - Abnormal; Notable for the following components:    WBC, UA Too Numerous to Count (*)     Bacteria, UA 1+ (*)     Squamous Epithelial Cells, UA 3-6 (*)     All other components within normal limits   POCT GLUCOSE FINGERSTICK - Abnormal; Notable for the following components:    Glucose 356 (*)     All other components within normal limits   POC LACTATE - Abnormal; Notable for the following components:    Lactate 3.0 (*)     All other components within normal limits   COVID-19 AND FLU A/B, NP SWAB IN TRANSPORT MEDIA 1 HR TAT - Normal    Narrative:     Fact sheet for providers: https://www.fda.gov/media/738783/download    Fact sheet for patients: https://www.fda.gov/media/063808/download    Test performed by PCR.   BNP (IN-HOUSE) - Normal    Narrative:     This assay is used as an aid in the diagnosis of individuals suspected of having heart failure. It can be used as an aid in the diagnosis of acute decompensated heart failure (ADHF) in patients presenting with signs and symptoms of ADHF to the emergency department (ED). In addition, NT-proBNP of <300 pg/mL indicates ADHF is not likely.    Age Range Result Interpretation  NT-proBNP Concentration (pg/mL:      <50             Positive            >450                   Gray                 300-450                    Negative             <300    50-75           Positive            >900                  Gray                300-900                  Negative            <300      >75             Positive            >1800                  Gray                300-1800                  Negative            <300   BLOOD CULTURE   BLOOD CULTURE   URINE  CULTURE   RAINBOW DRAW    Narrative:     The following orders were created for panel order [place] Draw.  Procedure                               Abnormality         Status                     ---------                               -----------         ------                     Green Top (Gel)[[id number]]                                  Final result               Lavender Top[[id number]]                                     Final result               Gold Top - SST[[id number]]                                   Final result               Light Blue Top[[id number]]                                   Final result                 Please view results for these tests on the individual orders.   LACTIC ACID, REFLEX   POC LACTATE   GREEN TOP   LAVENDER TOP   GOLD TOP - SST   LIGHT BLUE TOP   CBC AND DIFFERENTIAL    Narrative:     The following orders were created for panel order CBC & Differential.  Procedure                               Abnormality         Status                     ---------                               -----------         ------                     CBC Auto Differential[[id number]]        Abnormal            Final result                 Please view results for these tests on the individual orders.       LOC: Person, Place, Time, and Situation    Telemetry:  Telemetry    Cardiac Monitoring Ordered: yes    EKG:   ECG 12 Lead Stroke Evaluation   Preliminary Result   HEART RATE= 81  bpm   RR Interval= 740  ms   ND Interval= 154  ms   P Horizontal Axis= 6  deg   P Front Axis= 32  deg   QRSD Interval= 71  ms   QT Interval= 375  ms   QTcB= 436  ms   QRS Axis= -47  deg   T Wave Axis= 143  deg   - ABNORMAL ECG -   Sinus rhythm   Left anterior fascicular block   Probable LVH with secondary repol abnrm   Electronically Signed By:    Date and Time of Study: 2023-12-01 18:09:17          Medications Given in the ED:   Medications   cefTRIAXone (ROCEPHIN) 2,000 mg in sodium chloride 0.9 % 100 mL IVPB (2,000 mg Intravenous  New Bag 23)   sodium chloride 0.9 % bolus 1,000 mL (1,000 mL Intravenous New Bag 23)   aspirin chewable tablet 81 mg (has no administration in time range)   clopidogrel (PLAVIX) tablet 75 mg (has no administration in time range)   midazolam (VERSED) injection 2 mg (2 mg Intravenous Given 23)   gadoteridol (PROHANCE) injection 15 mL (15 mL Intravenous Given 23)       Imaging results:  MRI Brain With & Without Contrast    Result Date: 2023  Impression: 1. Small 4 mm focus of restricted diffusion within the dorsal aspect of the jose near the left cerebellar peduncles likely representing a small acute or subacute infarct given patient's symptoms. 2. Encephalomalacia within the right occipital lobe corresponding to an old chronic right PCA territory infarct. 3. Mild periventricular white matter T2/FLAIR hyperintense signal changes likely representing sequelae of chronic small vessel ischemic disease. Findings called to Dr. Person at 9:23 p.m. on 2023. Electronically Signed: Davis Dubose  2023 9:23 PM EST  Workstation ID: [id number] [employer]    XR Chest 1 View    Result Date: 2023  Impression: No acute process identified Electronically Signed: Dashawn Todd MD  2023 6:04 PM CST  Workstation ID: [id number]     Social issues:   Social History     Socioeconomic History    Marital status:    Tobacco Use    Smoking status: Former     Packs/day: 1.00     Years: 20.00     Additional pack years: 0.00     Total pack years: 20.00     Types: Cigarettes     Start date:      Quit date: 2011     Years since quittin.8    Smokeless tobacco: Never    Tobacco comments:     Quit around    Vaping Use    Vaping Use: Never used   Substance and Sexual Activity    Alcohol use: Never    Drug use: Never    Sexual activity: Not Currently       NIH Stroke Scale:  Interval: (not recorded)  1a. Level of Consciousness: (not recorded)  1b. LOC Questions: (not  recorded)  1c. LOC Commands: (not recorded)  2. Best Gaze: (not recorded)  3. Visual: (not recorded)  4. Facial Palsy: (not recorded)  5a. Motor Arm, Left: (not recorded)  5b. Motor Arm, Right: (not recorded)  6a. Motor Leg, Left: (not recorded)  6b. Motor Leg, Right: (not recorded)  7. Limb Ataxia: (not recorded)  8. Sensory: (not recorded)  9. Best Language: (not recorded)  10. Dysarthria: (not recorded)  11. Extinction and Inattention (formerly Neglect): (not recorded)    Total (NIH Stroke Scale): (not recorded)     Additional notable assessment information: Right sided numbness and tingling, pt able to move all extremities and able to ambulate to and from bedside commode     Nursing report ED to floor:  PRATIK Eden RN   12/01/23 23:01 EST

## 2023-12-02 NOTE — CONSULTS
Primary Care Provider: Val Irvin*     Consult requested by: Admitting team    Reason for Consultation: Neurological evaluation /stroke symptoms, positive MRI    History taken from: patient chart family RN    Chief complaint: Right-sided numbness       SUBJECTIVE:    History of present illness: Background per H&P: Lula Burnett is a 73 y.o. year old female who was evaluated in room 250 at [facility]    Source of information is the patient: The medical records and my discussion with the patient's son and granddaughter, who are present at bedside      This is a very pleasant 73-year-old right-handed white female who has prior history of strokes and does have significant risk factors including hypertension hyperlipidemia and diabetes type 2 who presented with not feeling well and kind of ataxic yesterday and later on had acute numbness on the right side.  So she called her son who told her to come to the emergency room and she was evaluated and found to have a small pontine infarct but her symptoms have all resolved.    Since his symptoms started in the morning and she was not a candidate for TNK/tPA    She did not look like LVO    Her NIH right now is 0    Her MRI was positive for stroke and the other impressive thing in her evaluation was that she had significant hypertension, we had several systolic numbers like in the 180s and 190      It looks like she is not taking care of her diet, her LDL was elevated at 126 and her triglycerides are 581 with total cholesterol of 281    Her hemoglobin A1c was 12.20  She has some renal issues    He also looks like she has UTI    Takes Plavix since she says aspirin gave her bruising, even 81 mg    Stroke is small vessel and I had very very very detailed discussion with the patient and her family    Will do platelet function analysis for Plavix and if its abnormal then we may have to switch her to Brilinta but usually that would need baby aspirin with  that and that may be an issue for her.  So the other thing would be modification of stroke risk factors anyway and it does not look like she has been doing    He denies noncompliance            As per admitting,  Lula Burnett is a 73 y.o. female with PMH of hypertension, hyperlipidemia, hypothyroid, diabetes mellitus type 2, paroxysmal atrial fibrillation, and previous strokes presented to the hospital for weakness, and was admitted with a principal diagnosis of Left pontine stroke.  Patient states earlier today she noticed some severe right-sided numbness however she reported to emergency room provider that she had left-sided weakness.  She states that she also has felt very confused over the past few days.  She states she had been having what she thought were panic attacks over the previous nights and had a near syncopal incident today.  Patient states she called her son who advised her to come to the emergency department via EMS.  Patient has had 3 strokes previously and admits to some baseline memory loss.           - Portions of the above HPI were copied from previous encounters and edited as appropriate. PMH as detailed below.     Review of Systems   No fever chills rigors or sweats  No weight issues  No sleep problems  HEENT:  No speech problem, vision changes, facial asymmetry or pain, or neck problem  Chest: No chest pain, clubbing, cyanosis, orthopnea palpitations  Pulmonary:  No shortness of air, cough or expectoration  Abdomen:  No swelling/tension, constipation,diarrhea or pain  No genitourinary symptoms  Extremity problems as discussed  No back problem  No psychotic issues  Neurologic issues as discussed  No hematologic, dermatologic or endocrine problems, she does have diabetes        PATIENT HISTORY:  Past Medical History:   Diagnosis Date    Anxiety     Asthma     COVID-19     Diagnosed with positive test 8/4/22.    CVA (cerebral vascular accident)     x3    Depression     Diabetes      Diabetic neuropathy     Former smoker     GERD (gastroesophageal reflux disease)     Hypertension     Hypothyroidism     Mitral stenosis     Mitral valve annular calcification     Peptic ulceration     PONV (postoperative nausea and vomiting)     Psoriasis     Vertigo    ,   Past Surgical History:   Procedure Laterality Date    CARDIAC CATHETERIZATION      CARDIAC CATHETERIZATION N/A 2022    Procedure: Coronary angiography;  Surgeon: Eugene Lundy MD;  Location:  [facility];  Service: Cardiovascular;  Laterality: N/A;    CARDIAC CATHETERIZATION N/A 2022    Procedure: Left heart cath;  Surgeon: Eugene Lundy MD;  Location:  [facility];  Service: Cardiovascular;  Laterality: N/A;    CARDIAC CATHETERIZATION N/A 2022    Procedure: Left ventriculography;  Surgeon: Eugene Lundy MD;  Location:  [facility];  Service: Cardiovascular;  Laterality: N/A;    CARDIAC ELECTROPHYSIOLOGY PROCEDURE N/A 2022    Procedure: LOOP INSERTION linq;  Surgeon: Eugene Lundy MD;  Location:  [facility];  Service: Cardiovascular;  Laterality: N/A;    CHOLECYSTECTOMY  2000    HYSTERECTOMY     ,   Family History   Problem Relation Age of Onset    Heart failure Mother     Lung cancer Father     Stroke Sister     Breast cancer Neg Hx     Ovarian cancer Neg Hx    ,   Social History     Tobacco Use    Smoking status: Former     Packs/day: 1.00     Years: 20.00     Additional pack years: 0.00     Total pack years: 20.00     Types: Cigarettes     Start date:      Quit date: 2011     Years since quittin.8    Smokeless tobacco: Never    Tobacco comments:     Quit around    Vaping Use    Vaping Use: Never used   Substance Use Topics    Alcohol use: Never    Drug use: Never   ,   Prior to Admission medications    Medication Sig Start Date End Date Taking? Authorizing Provider   ALPRAZolam (XANAX) 0.25 MG tablet  Take 0.5 tablets by mouth 2 (Two) Times a Day. 1/24/23  Yes Val Irvin MD   cetirizine (zyrTEC) 10 MG tablet TAKE 1 TABLET DAILY 3/28/23  Yes Val Irvin MD   cyclobenzaprine (FLEXERIL) 10 MG tablet Take 1 tablet by mouth 3 (Three) Times a Day As Needed for Muscle Spasms. 1/24/23  Yes Val Irvin MD   digoxin (LANOXIN) 125 MCG tablet 1 tablet alternating with 2 tablets every other day  Patient taking differently: 2 tablets. 1 tablet daily 7/26/23  Yes Val Irvin MD   estrogens, conjugated, (PREMARIN) 1.25 MG tablet Take 1 tablet by mouth Daily. 6/27/23  Yes Val Irvin MD   levothyroxine (SYNTHROID, LEVOTHROID) 50 MCG tablet TAKE 1 TABLET BY MOUTH DAILY 7/24/23  Yes Val Irvin MD   pantoprazole (PROTONIX) 40 MG EC tablet Take 1 tablet by mouth Daily. 1/24/23  Yes Val Irvin MD   PARoxetine (PAXIL) 20 MG tablet Take 1 tablet by mouth Daily. 1/24/23  Yes Val Irvin MD   ProAir  (90 Base) MCG/ACT inhaler Inhale 2 puffs Every 4 (Four) Hours As Needed for Wheezing or Shortness of Air. 1/24/23  Yes Val Irvin MD   Synjardy XR  MG tablet sustained-release 24 hour Take 1 tablet by mouth Daily. 1/24/23  Yes Val Ivrin MD   topiramate (TOPAMAX) 25 MG tablet Take 1 tablet by mouth 2 (Two) Times a Day. 1/24/23  Yes Val Irvin MD   Turmeric 500 MG capsule Take  by mouth.   Yes ProviderRey MD   Adalimumab (Humira Pen) 40 MG/0.8ML Pen-injector Kit Inject 40 mg under the skin into the appropriate area as directed Every 14 (Fourteen) Days. 1/24/23   Val Irvin MD   atorvastatin (LIPITOR) 80 MG tablet Take 1 tablet by mouth Daily.    ProviderRey MD   carvedilol (COREG) 6.25 MG tablet Take 1 tablet by mouth 2 (Two) Times a Day With Meals. 11/20/23   Val Irvin MD   clopidogrel (PLAVIX) 75 MG tablet TAKE 1 TABLET  DAILY 9/26/23   Val Irvin MD   Fluticasone-Salmeterol (ADVAIR/WIXELA) 250-50 MCG/ACT DISKUS Inhale 1 puff 2 (Two) Times a Day. 1/24/23   Val Irvin MD   ibuprofen (ADVIL,MOTRIN) 600 MG tablet Take 1 tablet by mouth Every 6 (Six) Hours As Needed for Mild Pain.    Provider, MD Rey   Insulin Glargine (Lantus SoloStar) 100 UNIT/ML injection pen INJECT 10 UNITS DAILY 11/27/23   Val Irvin MD   Insulin Pen Needle (B-D UF III MINI PEN NEEDLES) 31G X 5 MM misc 1 Piece Daily. 4/10/23   Val Irvin MD   meclizine (ANTIVERT) 25 MG tablet TAKE 1 TABLET THREE TIMES A DAY AS NEEDED FOR DIZZINESS 9/22/23   Val Irvin MD   montelukast (SINGULAIR) 10 MG tablet Take 1 tablet by mouth Every Night. 1/24/23   Val Irvin MD   Multiple Vitamins-Minerals (CENTRUM FRESH/FRUITY 50+ PO) Take  by mouth.    Provider, MD Rey   nitroglycerin (NITROSTAT) 0.4 MG SL tablet 1 under the tongue as needed for angina, may repeat q5mins for up three doses 1/28/22   Gumaro Hancock MD   ondansetron ODT (ZOFRAN-ODT) 8 MG disintegrating tablet PLACE 1 TABLET ON THE TONGUE EVERY 8 HOURS AS NEEDED FOR NAUSEA OR VOMITING 2/10/23   Val Irvin MD   potassium chloride (K-DUR,KLOR-CON) 20 MEQ CR tablet TAKE 1 TABLET DAILY 10/3/23   Val Irvin MD   rosuvastatin (CRESTOR) 20 MG tablet TAKE 1 TABLET DAILY 7/5/23   Val Irvin MD   triamterene-hydrochlorothiazide (MAXZIDE) 75-50 MG per tablet TAKE 1 TABLET DAILY 4/7/23   Val Irvin MD    Allergies:  Codeine    Current Facility-Administered Medications   Medication Dose Route Frequency Provider Last Rate Last Admin    acetaminophen (TYLENOL) tablet 650 mg  650 mg Oral Q4H PRN Ning Becker APRN        Or    acetaminophen (TYLENOL) suppository 650 mg  650 mg Rectal Q4H PRN Ning Becker APRN        ALPRAZolam (XANAX) tablet 0.125 mg  0.125 mg  Oral BID Taco Oakes MD        aspirin tablet 325 mg  325 mg Oral Daily Ning Becker APRN   325 mg at 12/02/23 0938    Or    aspirin suppository 300 mg  300 mg Rectal Daily Ning Becker APRN        atorvastatin (LIPITOR) tablet 80 mg  80 mg Oral Nightly Ning Becker APRN   80 mg at 12/02/23 0055    atorvastatin (LIPITOR) tablet 80 mg  80 mg Oral Daily aTco Oakes MD        bisacodyl (DULCOLAX) suppository 10 mg  10 mg Rectal Daily PRN Ning Becker APRN        carvedilol (COREG) tablet 6.25 mg  6.25 mg Oral BID With Meals Taco Oakes MD        cefTRIAXone (ROCEPHIN) 2,000 mg in sodium chloride 0.9 % 100 mL IVPB  2,000 mg Intravenous Q24H Ning Becker APRN        clopidogrel (PLAVIX) tablet 75 mg  75 mg Oral Daily Ash Person MD   75 mg at 12/02/23 0938    clopidogrel (PLAVIX) tablet 75 mg  75 mg Oral Daily Taco Oakes MD        cyclobenzaprine (FLEXERIL) tablet 10 mg  10 mg Oral TID PRN Taco Oakse MD        dextrose (D50W) (25 g/50 mL) IV injection 10-50 mL  10-50 mL Intravenous Q15 Min PRN Ning Becker APRN        dextrose (GLUTOSE) oral gel 15 g  15 g Oral Q15 Min PRN Ning Becker APRN        digoxin (LANOXIN) tablet 0.25 mg  0.25 mg Oral Daily Taco Oakes MD        Glucagon (GLUCAGEN) injection 1 mg  1 mg Intramuscular Q15 Min PRN Ning Becker APRN        insulin glargine (LANTUS, SEMGLEE) injection 1-200 Units  1-200 Units Subcutaneous Nightly - Glucommander Ning Becker APRN   19 Units at 12/02/23 0056    insulin glargine (LANTUS, SEMGLEE) injection 10 Units  10 Units Subcutaneous Daily Taco Oakes MD        insulin lispro (HUMALOG/ADMELOG) injection 1-200 Units  1-200 Units Subcutaneous 4x Daily With Meals & Nightly Ning Becker APRN   8 Units at 12/02/23 1219    insulin lispro (HUMALOG/ADMELOG) injection 1-200 Units  1-200 Units Subcutaneous PRN Ning Becker, NATALY   1 Units at  12/02/23 0055    labetalol (NORMODYNE,TRANDATE) injection 10 mg  10 mg Intravenous Q4H PRN Ning Becker APRN   10 mg at 12/02/23 0938    levothyroxine (SYNTHROID, LEVOTHROID) tablet 50 mcg  50 mcg Oral Daily Taco Oakes MD        meclizine (ANTIVERT) tablet 25 mg  25 mg Oral TID PRN Taco Oakes MD        montelukast (SINGULAIR) tablet 10 mg  10 mg Oral Nightly Taco Oakes MD        ondansetron (ZOFRAN) injection 4 mg  4 mg Intravenous Q6H PRN Ning Becker APRN        pantoprazole (PROTONIX) EC tablet 40 mg  40 mg Oral Daily Taco Oakes MD        PARoxetine (PAXIL) tablet 20 mg  20 mg Oral Daily Taco Oakes MD        rosuvastatin (CRESTOR) tablet 20 mg  20 mg Oral Daily Taco Oakes MD        sodium chloride 0.9 % flush 10 mL  10 mL Intravenous Q12H Ning Becker APRN   10 mL at 12/02/23 0948    sodium chloride 0.9 % flush 10 mL  10 mL Intravenous PRN Ning Becker APRN        sodium chloride 0.9 % infusion 40 mL  40 mL Intravenous PRN Ning Becker APRN        topiramate (TOPAMAX) tablet 25 mg  25 mg Oral BID Taco Oakes MD        triamterene-hydrochlorothiazide (MAXZIDE) 75-50 MG per tablet 1 tablet  1 tablet Oral Daily Taco Oakes MD            ________________________________________________________        OBJECTIVE:  Upon today's exam, the patient is resting comfortably in bed in no acute distress     Neurologic Exam    The patient is awake and alert and oriented x3  Normal speech  Cranial nerve examination demonstrate:  Full fields of vision to confrontation  Pupils are round, reactive to light and accommodation and size of about 3 mm  No ptosis or nystagmus  Funduscopic examination was not successful  Eye movements are conjugate     Sensation on the face and scalp are normal  Muscles of mastication are normal and symmetric  Muscles of  facial expression are normal and symmetric  Hearing is intact  bilaterally  Head turning and shoulder shrugs were unremarkable  Tongue was midline  I could not visualize her oropharynx or uvula     Motor examination:  Normal bulk, tone and strength was 5/5  No fasciculations     Sensory examination:  Intact for soft touch, pain and position sensation  Romberg was not evaluated     Reflexes:  0/4     Coordination:  Normal finger-to-nose to finger, rapid alternating movements and toe to finger     Gait:  Deferred     Toe signs:  Mute      NIH stroke scale  NIHSS:    Level Of Consciousness 0  0=Alert; keenly responsive 1=Not alert, but arousable by minor stimulation 2=Not alert, requires repeated stimulation 3=Responds only with reflex movements    LOC Questions to Month and age 0  0=Answers both questions correctly 1=Answers one question correctly 2=Answers neither question correctly    LOC Commands      -Open/Close eyes 0    -Open/close  0   0=Performs both tasks correctly 1=Performs one task correctly 2=Performs neighter task correctly     Best Gaze 0  0=Normal 1=Partial gaze palsy 2=Forced deviation, or total gaze paresis    Visual 0  0=No visual loss 1=Partial hemianopia 2=Complete hemianopia 3=Bilateral hemianopia (blind including cortical blindness)    Facial Palsy 0  0=Normal symmetrical movement 1=Minor paralysis (asymmetry) 2=Partial paralysis (lower facde) 3=Complete paralysis (upper and lower face)    Motor: Left Arm-0  Left leg-0; Right Arm-0 Right Leg-0  0=No drift, limb holds posture for full 10 seconds 1=Drift, limb holds posture, no drift to bed 2=Some antigravity effort, cannot maintain posture, drifts to bed 3=No effort against gravity, limb falls 4=No movement    Limb Ataxia 0  0=Absent 1=Present in one limb 2=Present in two limbs    Sensory 0   0=Normal 1=Mild to moderate sensory loss 2=Severe to total sensory loss    Best Language 0   0=No aphasia, normal 1=Mild to moderate aphasia 2=Severe Aphasia (very few words correct or understood)3=Multe, global  aphasia    Dysarthria 0  0=Normal 1=Mild to moderate 2=Severe, unintelligible or mute/anarthric    Extinction/Neglect 0   0=No abnormality 1=Extinction to bilateral simultaneous stimulation 2=Profound neglect    Total  __0      ________________________________________________________   RESULTS REVIEW:    VITAL SIGNS:   Temp:  [97.6 °F (36.4 °C)-98.8 °F (37.1 °C)] 98.3 °F (36.8 °C)  Heart Rate:  [76-99] 80  Resp:  [16-22] 16  BP: (152-190)/(63-96) 152/75     LABS:      Lab 12/02/23  0037 12/01/23 2246 12/01/23 1923 12/01/23  1818   WBC 8.90  --   --  10.60   HEMOGLOBIN 13.8  --   --  14.9   HEMATOCRIT 42.0  --   --  44.4   PLATELETS 307  --   --  353   NEUTROS ABS  --   --   --  4.90   LYMPHS ABS  --   --   --  4.90*   MONOS ABS  --   --   --  0.60   EOS ABS  --   --   --  0.10   MCV 85.5  --   --  86.4   LACTATE  --  2.2* 3.0*  --          Lab 12/02/23 0037 12/01/23  1818   SODIUM 137 131*   POTASSIUM 3.5 4.1   CHLORIDE 97* 92*   CO2 25.0 18.0*   ANION GAP 15.0 21.0*   BUN 26* 29*   CREATININE 1.37* 1.32*   EGFR 40.3* 42.2*   GLUCOSE 206* 357*   CALCIUM 9.6 10.3   HEMOGLOBIN A1C 12.20*  --    TSH 4.300*  --          Lab 12/02/23 0037 12/01/23  1818   TOTAL PROTEIN 7.2 7.7   ALBUMIN 4.0 4.5   GLOBULIN 3.2 3.2   ALT (SGPT) 89* 110*   AST (SGOT) 101* 145*   BILIRUBIN 0.2 0.3   ALK PHOS 113 129*         Lab 12/01/23  1818   PROBNP 251.4   HSTROP T 29*         Lab 12/02/23  0037   CHOLESTEROL 271*   LDL CHOL 126*   HDL CHOL 39*   TRIGLYCERIDES 581*         Lab 12/02/23  0037   IRON 43   IRON SATURATION (TSAT) 12*   TIBC 352   TRANSFERRIN 236   VITAMIN B 12 1,010*         UA          12/1/2023    21:11   Urinalysis   Squamous Epithelial Cells, UA 3-6    Specific Gravity, UA 1.021    Ketones, UA Negative    Blood, UA Small (1+)    Leukocytes, UA Moderate (2+)    Nitrite, UA Negative    RBC, UA 0-2    WBC, UA Too Numerous to Count    Bacteria, UA 1+        Lab Results   Component Value Date    TSH 4.300 (H) 12/02/2023      (H) 12/02/2023    HGBA1C 12.20 (H) 12/02/2023    [id number] 1,010 (H) 12/02/2023       IMAGING STUDIES:  US Liver    Result Date: 12/2/2023  1.Echogenic liver, compatible with hepatic steatosis versus fibrosis. 2.1.5 cm hypoechoic liver lesion, incompletely characterized. Further evaluation with three-phase liver CT may be beneficial. 3.The gallbladder was not imaged. Electronically Signed: Ac Fiore MD  12/2/2023 8:58 AM EST  Workstation ID: [id number]    MRI Brain With & Without Contrast    Result Date: 12/1/2023  Impression: 1. Small 4 mm focus of restricted diffusion within the dorsal aspect of the jose near the left cerebellar peduncles likely representing a small acute or subacute infarct given patient's symptoms. 2. Encephalomalacia within the right occipital lobe corresponding to an old chronic right PCA territory infarct. 3. Mild periventricular white matter T2/FLAIR hyperintense signal changes likely representing sequelae of chronic small vessel ischemic disease. Findings called to Dr. Person at 9:23 p.m. on 12/1/2023. Electronically Signed: Davis Dubose  12/1/2023 9:23 PM EST  Workstation ID: [id number] [employer]    XR Chest 1 View    Result Date: 12/1/2023  Impression: No acute process identified Electronically Signed: Dashawn Todd MD  12/1/2023 6:04 PM CST  Workstation ID: [id number]     I reviewed the patient's new clinical results.    ________________________________________________________     PROBLEM LIST:    Left pontine stroke    Diabetes    Diabetic neuropathy    GERD (gastroesophageal reflux disease)    Hypertension    Hypothyroidism    Mitral stenosis    Mixed hyperlipidemia    Anxiety    Depression    Class 1 obesity in adult    Memory impairment    Paroxysmal atrial fibrillation    UTI (urinary tract infection), bacterial    Transaminitis            ASSESSMENT/PLAN:  Small pontine infarct  Symptoms resolved    Small vessel disease    Not TNK/tPA or intervention  candidate        Modification of stroke risk factors:   - Blood pressure should be less than 130/80 outpatient, HbA1c less than 6.5, LDL less than 70; b12>500 and smoking cessation if applicable. We would be grateful if the primary team / primary care physician would keep a close watch on the above targets.  - Stroke education  - Follow up with neurologist of choice      I discussed the patient's findings and my recommendations with patient, family, and nursing staff    Gorge Baird MD  12/02/23  15:21 EST

## 2023-12-02 NOTE — SIGNIFICANT NOTE
Spoke w/ pt's RN re: pt's appropriateness for cognitive-linguistic evaluation in setting of SLP orders received per CVA protocol. Pt's nurse reported she was getting ready to DC this afternoon. SLP will f/u tomorrow or Monday if pt is still here.

## 2023-12-02 NOTE — PLAN OF CARE
Problem: Adult Inpatient Plan of Care  Goal: Plan of Care Review  Outcome: Ongoing, Progressing  Goal: Patient-Specific Goal (Individualized)  Outcome: Ongoing, Progressing  Goal: Absence of Hospital-Acquired Illness or Injury  Outcome: Ongoing, Progressing  Intervention: Identify and Manage Fall Risk  Recent Flowsheet Documentation  Taken 12/2/2023 0600 by Margarita Hernandez RN  Safety Promotion/Fall Prevention:   safety round/check completed   room organization consistent   fall prevention program maintained   clutter free environment maintained   assistive device/personal items within reach  Taken 12/2/2023 0400 by Margarita Hernandez RN  Safety Promotion/Fall Prevention:   safety round/check completed   room organization consistent   clutter free environment maintained   assistive device/personal items within reach   fall prevention program maintained  Taken 12/2/2023 0200 by Margarita Hernandez RN  Safety Promotion/Fall Prevention:   safety round/check completed   room organization consistent   clutter free environment maintained   assistive device/personal items within reach   fall prevention program maintained  Taken 12/2/2023 0000 by Margarita Hernandez RN  Safety Promotion/Fall Prevention:   safety round/check completed   room organization consistent   clutter free environment maintained   assistive device/personal items within reach   fall prevention program maintained   nonskid shoes/slippers when out of bed  Intervention: Prevent Skin Injury  Recent Flowsheet Documentation  Taken 12/2/2023 0600 by Margarita Hernandez RN  Body Position: position changed independently  Taken 12/2/2023 0400 by Margarita Hernandez RN  Body Position: position changed independently  Taken 12/2/2023 0200 by Margarita Hernandez RN  Body Position: position changed independently  Taken 12/2/2023 0000 by Margarita Hernandez RN  Body Position: position changed independently  Skin Protection: adhesive use limited  Intervention:  Prevent and Manage VTE (Venous Thromboembolism) Risk  Recent Flowsheet Documentation  Taken 12/2/2023 0400 by Margarita Hernandez RN  Activity Management: activity encouraged  Taken 12/2/2023 0200 by Margarita Hernandez RN  Activity Management: activity encouraged  Taken 12/2/2023 0000 by Margarita Hernandez RN  Activity Management: activity encouraged  VTE Prevention/Management:   bilateral   sequential compression devices on  Intervention: Prevent Infection  Recent Flowsheet Documentation  Taken 12/2/2023 0600 by Margarita Hernandez RN  Infection Prevention:   single patient room provided   rest/sleep promoted   personal protective equipment utilized   hand hygiene promoted   environmental surveillance performed  Taken 12/2/2023 0400 by Margarita Hernandez RN  Infection Prevention:   single patient room provided   rest/sleep promoted   personal protective equipment utilized   hand hygiene promoted   environmental surveillance performed  Taken 12/2/2023 0200 by Margarita Hernandez RN  Infection Prevention: visitors restricted/screened  Taken 12/2/2023 0000 by Margarita Hernandez RN  Infection Prevention:   single patient room provided   rest/sleep promoted   personal protective equipment utilized   hand hygiene promoted   environmental surveillance performed  Goal: Optimal Comfort and Wellbeing  Outcome: Ongoing, Progressing  Intervention: Provide Person-Centered Care  Recent Flowsheet Documentation  Taken 12/2/2023 0400 by Margarita Hernandez RN  Trust Relationship/Rapport: care explained  Taken 12/2/2023 0000 by Margarita Hernandez RN  Trust Relationship/Rapport:   care explained   choices provided   emotional support provided  Goal: Readiness for Transition of Care  Outcome: Ongoing, Progressing  Intervention: Mutually Develop Transition Plan  Recent Flowsheet Documentation  Taken 12/2/2023 0005 by Margarita Hernandez RN  Transportation Anticipated: family or friend will provide  Patient/Family Anticipated  Services at Transition:   Patient/Family Anticipates Transition to: home  Taken 12/2/2023 0003 by Margarita Hernandez RN  Equipment Currently Used at Home: none     Problem: Asthma Comorbidity  Goal: Maintenance of Asthma Control  Outcome: Ongoing, Progressing  Intervention: Maintain Asthma Symptom Control  Recent Flowsheet Documentation  Taken 12/2/2023 0600 by Margarita Hernandez RN  Medication Review/Management:   medications reviewed   high-risk medications identified  Taken 12/2/2023 0400 by Margarita Hernandez RN  Medication Review/Management:   medications reviewed   high-risk medications identified  Taken 12/2/2023 0200 by Margarita Hernandez RN  Medication Review/Management:   medications reviewed   high-risk medications identified  Taken 12/2/2023 0000 by Margarita Hernandez RN  Medication Review/Management:   medications reviewed   high-risk medications identified     Problem: Behavioral Health Comorbidity  Goal: Maintenance of Behavioral Health Symptom Control  Outcome: Ongoing, Progressing  Intervention: Maintain Behavioral Health Symptom Control  Recent Flowsheet Documentation  Taken 12/2/2023 0600 by Margarita Hernandez RN  Medication Review/Management:   medications reviewed   high-risk medications identified  Taken 12/2/2023 0400 by Margarita Hernandez RN  Medication Review/Management:   medications reviewed   high-risk medications identified  Taken 12/2/2023 0200 by Margarita Hernandez RN  Medication Review/Management:   medications reviewed   high-risk medications identified  Taken 12/2/2023 0000 by Margarita Hernandez RN  Medication Review/Management:   medications reviewed   high-risk medications identified     Problem: Diabetes Comorbidity  Goal: Blood Glucose Level Within Targeted Range  Outcome: Ongoing, Progressing  Intervention: Monitor and Manage Glycemia  Recent Flowsheet Documentation  Taken 12/2/2023 0400 by Margarita Hernandez RN  Glycemic Management: blood glucose  monitored  Taken 12/2/2023 0000 by Margarita Hernandez RN  Glycemic Management: blood glucose monitored     Problem: Hypertension Comorbidity  Goal: Blood Pressure in Desired Range  Outcome: Ongoing, Progressing  Intervention: Maintain Blood Pressure Management  Recent Flowsheet Documentation  Taken 12/2/2023 0600 by Margarita Hernandez RN  Medication Review/Management:   medications reviewed   high-risk medications identified  Taken 12/2/2023 0400 by Margarita Hernandez RN  Medication Review/Management:   medications reviewed   high-risk medications identified  Taken 12/2/2023 0200 by Margarita Hernandez RN  Medication Review/Management:   medications reviewed   high-risk medications identified  Taken 12/2/2023 0000 by Margarita Hernandez RN  Medication Review/Management:   medications reviewed   high-risk medications identified   Goal Outcome Evaluation:

## 2023-12-02 NOTE — H&P
Hospitalist History and Physical     Lula Burnett : 1950 MRN:[id number] LOS:0 [facility]: [facility]     Reason for admission: Left pontine stroke     Assessment / Plan     Stroke/TIA  -Reviewed imaging   -EKG reviewed  -ECHO  -TSH,  Lipid panel, A1C, Vit B12  -Accu-Checks every 6  -Neuro checks q2  -NIH q shift  -MRI brain  -PT/OT eval  -NPO pending, swallow study  -Continue/start statin  -Continue ASA  -Avoid hydralazine for BP control  -No hypotonic fluids  -Neurology following    UTI  -UA reviewed  -Continue Traxam    Transaminitis  -Hepatitis panel  -Iron studies  -US liver  -Repeat LFTs in am    Diabetes mellitus type 2, chronic  -A1C  --Hold home oral diabetes medications for now  -Start SSI  -Accu-checks every 6 hours  -Diabetic educator due to elevated blood sugar on admission    Chronic conditions: Hypertension, hyperlipidemia, hypothyroid, paroxysmal atrial fibrillation  - Continue home medications as appropriate  - Monitor routine labs and vital signs    Medical Intervention Limits: NO intubation (DNI)  Code Status (Patient has no pulse and is not breathing): No CPR (Do Not Attempt to Resuscitate)  Medical Interventions (Patient has pulse or is breathing): Limited Support       Nutrition:   NPO Diet NPO Type: Strict NPO     DVT prophylaxis:  Mechanical DVT prophylaxis orders are present.     History of Present illness     Lula Burnett is a 73 y.o. female with PMH of hypertension, hyperlipidemia, hypothyroid, diabetes mellitus type 2, paroxysmal atrial fibrillation, and previous strokes presented to the hospital for weakness, and was admitted with a principal diagnosis of Left pontine stroke.  Patient states earlier today she noticed some severe right-sided numbness however she reported to emergency room provider that she had left-sided weakness.  She states that she also has felt very confused over the past few days.  She states she had been having what she thought were panic attacks over the  previous nights and had a near syncopal incident today.  Patient states she called her son who advised her to come to the emergency department via EMS.  Patient has had 3 strokes previously and admits to some baseline memory loss.      ACP: Patient wishes to be DNR/DNI; her son is her decision-maker if she is unable.    Patient was seen and examined on 12/01/23 at 23:35 EST .    Subjective / Review of systems     Review of Systems   Constitutional:  Negative for diaphoresis and fatigue.   HENT:  Negative for congestion and sore throat.    Eyes:  Negative for pain and redness.   Respiratory:  Negative for cough and shortness of breath.    Cardiovascular:  Negative for chest pain and leg swelling.   Gastrointestinal:  Negative for abdominal pain, nausea and vomiting.   Endocrine: Negative for polydipsia and polyphagia.   Genitourinary:  Negative for dysuria and flank pain.   Musculoskeletal:  Negative for back pain and joint swelling.   Skin:  Negative for color change and pallor.   Neurological:  Positive for weakness. Negative for dizziness and seizures.   Psychiatric/Behavioral:  Positive for confusion. Negative for behavioral problems.         Past Medical/Surgical/Social/Family History & Allergies     Past Medical History:   Diagnosis Date    Anxiety     Asthma     COVID-19     Diagnosed with positive test 8/4/22.    CVA (cerebral vascular accident)     x3    Depression     Diabetes     Diabetic neuropathy     Former smoker     GERD (gastroesophageal reflux disease)     Hypertension     Hypothyroidism     Mitral stenosis     Mitral valve annular calcification     Peptic ulceration     PONV (postoperative nausea and vomiting)     Psoriasis     Vertigo       Past Surgical History:   Procedure Laterality Date    CARDIAC CATHETERIZATION      CARDIAC CATHETERIZATION N/A 02/07/2022    Procedure: Coronary angiography;  Surgeon: Eugene Lundy MD;  Location: [facility] INVASIVE LOCATION;  Service: Cardiovascular;   Laterality: N/A;    CARDIAC CATHETERIZATION N/A 2022    Procedure: Left heart cath;  Surgeon: Eugene Lundy MD;  Location:  [facility] CATH INVASIVE LOCATION;  Service: Cardiovascular;  Laterality: N/A;    CARDIAC CATHETERIZATION N/A 2022    Procedure: Left ventriculography;  Surgeon: Eugene Lundy MD;  Location:  [facility] CATH INVASIVE LOCATION;  Service: Cardiovascular;  Laterality: N/A;    CARDIAC ELECTROPHYSIOLOGY PROCEDURE N/A 2022    Procedure: LOOP INSERTION linq;  Surgeon: Eugene Lundy MD;  Location:  [facility] CATH INVASIVE LOCATION;  Service: Cardiovascular;  Laterality: N/A;    CHOLECYSTECTOMY  2000    HYSTERECTOMY        Social History     Socioeconomic History    Marital status:    Tobacco Use    Smoking status: Former     Packs/day: 1.00     Years: 20.00     Additional pack years: 0.00     Total pack years: 20.00     Types: Cigarettes     Start date:      Quit date: 2011     Years since quittin.8    Smokeless tobacco: Never    Tobacco comments:     Quit around    Vaping Use    Vaping Use: Never used   Substance and Sexual Activity    Alcohol use: Never    Drug use: Never    Sexual activity: Not Currently      Family History   Problem Relation Age of Onset    Heart failure Mother     Lung cancer Father     Stroke Sister     Breast cancer Neg Hx     Ovarian cancer Neg Hx       Allergies   Allergen Reactions    Codeine GI Intolerance      Social Determinants of Health     Tobacco Use: Medium Risk (2023)    Patient History     Smoking Tobacco Use: Former     Smokeless Tobacco Use: Never     Passive Exposure: Not on file   Alcohol Use: Not on file   Financial Resource Strain: Not on file   Food Insecurity: Not on file   Transportation Needs: Not on file   Physical Activity: Not on file   Stress: Not on file   Social Connections: Unknown (10/13/2023)    Family and Community Support     Help with Day-to-Day Activities: Not on file     Lonely or  Isolated: Not on file   Interpersonal Safety: Not At Risk (12/1/2023)    Abuse Screen     Unsafe at Home or Work/School: no     Feels Threatened by Someone?: no     Does Anyone Keep You from Contacting Others or Doint Things Outside the Home?: no     Physical Sign of Abuse Present: no   Depression: Not at risk (1/24/2023)    PHQ-2     PHQ-2 Score: 0   Housing Stability: Unknown (10/13/2023)    Housing Stability     Current Living Arrangements: Not on file     Potentially Unsafe Housing Conditions: Not on file   Utilities: Not on file   Health Literacy: Unknown (10/13/2023)    Education     Help with school or training?: Not on file     Preferred Language: Not on file   Employment: Unknown (10/13/2023)    Employment     Do you want help finding or keeping work or a job?: Not on file   Disabilities: Unknown (10/13/2023)    Disabilities     Concentrating, Remembering, or Making Decisions Difficulty: Not on file     Doing Errands Independently Difficulty: Not on file        Home Medications     Prior to Admission medications    Medication Sig Start Date End Date Taking? Authorizing Provider   Adalimumab (Humira Pen) 40 MG/0.8ML Pen-injector Kit Inject 40 mg under the skin into the appropriate area as directed Every 14 (Fourteen) Days. 1/24/23   Val Irvin MD   ALPRAZolam (XANAX) 0.25 MG tablet Take 0.5 tablets by mouth 2 (Two) Times a Day. 1/24/23   Val Irvin MD   carvedilol (COREG) 6.25 MG tablet Take 1 tablet by mouth 2 (Two) Times a Day With Meals. 11/20/23   Val Irvin MD   cetirizine (zyrTEC) 10 MG tablet TAKE 1 TABLET DAILY 3/28/23   Val Irvin MD   clopidogrel (PLAVIX) 75 MG tablet TAKE 1 TABLET DAILY 9/26/23   Val Irvin MD   cyclobenzaprine (FLEXERIL) 10 MG tablet Take 1 tablet by mouth 3 (Three) Times a Day As Needed for Muscle Spasms. 1/24/23   Val Irvin MD   digoxin (LANOXIN) 125 MCG tablet 1 tablet alternating with 2  tablets every other day 7/26/23   Val Irvin MD   estrogens, conjugated, (PREMARIN) 1.25 MG tablet Take 1 tablet by mouth Daily. 6/27/23   Val Irvin MD   Fluticasone-Salmeterol (ADVAIR/WIXELA) 250-50 MCG/ACT DISKUS Inhale 1 puff 2 (Two) Times a Day. 1/24/23   Val Irvin MD   ibuprofen (ADVIL,MOTRIN) 600 MG tablet Take 1 tablet by mouth Every 6 (Six) Hours As Needed for Mild Pain.    Provider, MD Rey   Insulin Glargine (Lantus SoloStar) 100 UNIT/ML injection pen INJECT 10 UNITS DAILY 11/27/23   Val Irvin MD   Insulin Pen Needle (B-D UF III MINI PEN NEEDLES) 31G X 5 MM misc 1 Piece Daily. 4/10/23   Val Irvin MD   levothyroxine (SYNTHROID, LEVOTHROID) 50 MCG tablet TAKE 1 TABLET BY MOUTH DAILY 7/24/23   Val Irvin MD   meclizine (ANTIVERT) 25 MG tablet TAKE 1 TABLET THREE TIMES A DAY AS NEEDED FOR DIZZINESS 9/22/23   Val Irvin MD   montelukast (SINGULAIR) 10 MG tablet Take 1 tablet by mouth Every Night. 1/24/23   Val Irvin MD   Multiple Vitamins-Minerals (CENTRUM FRESH/FRUITY 50+ PO) Take  by mouth.    Provider, MD Rey   nitroglycerin (NITROSTAT) 0.4 MG SL tablet 1 under the tongue as needed for angina, may repeat q5mins for up three doses 1/28/22   Gumaro Hancock MD   ondansetron ODT (ZOFRAN-ODT) 8 MG disintegrating tablet PLACE 1 TABLET ON THE TONGUE EVERY 8 HOURS AS NEEDED FOR NAUSEA OR VOMITING 2/10/23   Val Irvin MD   pantoprazole (PROTONIX) 40 MG EC tablet Take 1 tablet by mouth Daily. 1/24/23   Val Irvin MD   PARoxetine (PAXIL) 20 MG tablet Take 1 tablet by mouth Daily. 1/24/23   Val Irvin MD   potassium chloride (K-DUR,KLOR-CON) 20 MEQ CR tablet TAKE 1 TABLET DAILY 10/3/23   Val Irvin MD   ProAir  (90 Base) MCG/ACT inhaler Inhale 2 puffs Every 4 (Four) Hours As Needed for Wheezing or Shortness of  Air. 1/24/23   Val Irvin MD   rosuvastatin (CRESTOR) 20 MG tablet TAKE 1 TABLET DAILY 7/5/23   Val Irvin MD   Synjardy XR  MG tablet sustained-release 24 hour Take 1 tablet by mouth Daily. 1/24/23   Val Irvin MD   topiramate (TOPAMAX) 25 MG tablet Take 1 tablet by mouth 2 (Two) Times a Day. 1/24/23   Val Irvin MD   triamterene-hydrochlorothiazide (MAXZIDE) 75-50 MG per tablet TAKE 1 TABLET DAILY 4/7/23   Val Irvin MD   Turmeric 500 MG capsule Take  by mouth.    Provider, MD Rey        Objective / Physical Exam     Vital signs:  Temp: 98.8 °F (37.1 °C)  BP: 177/82  Heart Rate: 99  Resp: 22  SpO2: 97 %  Weight: 76.2 kg (168 lb)    Admission Weight: Weight: 76.2 kg (168 lb)    Physical Exam  Vitals and nursing note reviewed.   Constitutional:       Appearance: Normal appearance.   HENT:      Head: Normocephalic.      Nose: Nose normal.      Mouth/Throat:      Mouth: Mucous membranes are moist.      Pharynx: Oropharynx is clear.   Eyes:      Pupils: Pupils are equal, round, and reactive to light.   Cardiovascular:      Rate and Rhythm: Normal rate and regular rhythm.      Pulses: Normal pulses.      Heart sounds: Normal heart sounds.   Pulmonary:      Effort: Pulmonary effort is normal.      Breath sounds: Normal breath sounds.   Abdominal:      General: Bowel sounds are normal.      Palpations: Abdomen is soft.   Musculoskeletal:         General: Normal range of motion.      Cervical back: Normal range of motion.   Skin:     General: Skin is warm and dry.   Neurological:      General: No focal deficit present.      Mental Status: She is alert and oriented to person, place, and time.   Psychiatric:         Mood and Affect: Mood normal.         Behavior: Behavior normal.          Labs     Results from last 7 days   Lab Units 12/01/23  1818   WBC 10*3/mm3 10.60   HEMOGLOBIN g/dL 14.9   HEMATOCRIT % 44.4   PLATELETS 10*3/mm3 353       Results from last 7 days   Lab Units 12/01/23  1818   ALK PHOS U/L 129*   AST (SGOT) U/L 145*   ALT (SGPT) U/L 110*           Results from last 7 days   Lab Units 12/01/23  1818   SODIUM mmol/L 131*   POTASSIUM mmol/L 4.1   CHLORIDE mmol/L 92*   CO2 mmol/L 18.0*   BUN mg/dL 29*   CREATININE mg/dL 1.32*   GLUCOSE mg/dL 357*        Imaging     MRI Brain With & Without Contrast    Result Date: 12/1/2023  MRI BRAIN W WO CONTRAST Date of Exam: 12/1/2023 8:10 PM EST Indication: Increasing vertigo and ataxia, history of previous posterior stroke.  Comparison: Head CT dated 5/3/2023 Technique:  Routine multiplanar/multisequence sequence images of the brain were obtained before and after the uneventful administration of Prohance. Findings: There is a small 4 mm focus of restricted diffusion within the dorsal aspect of the jose near the left cerebellar peduncle. This is best seen on image 53 of series 2 with corresponding ADC loss on image 7 of series 3. This likely represents a tiny acute or subacute infarct. The ventricles and sulci are proportional without evidence of hydrocephalus. There is encephalomalacia within the right occipital lobe likely corresponding to a right PCA territory infarct. There is confluent periventricular white matter T2/FLAIR hyperintense signal changes likely representing sequelae of mild chronic small vessel ischemic disease. There is patchy T2/FLAIR white matter signal within the central aspect of the jose. There is some nonspecific hemosiderin deposition within the right aspect of the basal ganglia best seen on the SWI sequence however there is no corresponding calcification on the prior CT or corresponding signal abnormality on the other anatomic series. The midline structures including pituitary appear within normal limits. The craniocervical junction appears normal. The mastoid air cells and middle ears are well aerated. The paranasal sinuses are clear. Visualized orbits and globes  appear symmetric with thinning of the lenses bilaterally. There is no pathologic enhancement identified after administration of contrast. The dural venous sinuses are patent. The major intracranial vasculature appears patent centrally. There is no abnormal leptomeningeal or pachymeningeal enhancement.     Impression: 1. Small 4 mm focus of restricted diffusion within the dorsal aspect of the jose near the left cerebellar peduncles likely representing a small acute or subacute infarct given patient's symptoms. 2. Encephalomalacia within the right occipital lobe corresponding to an old chronic right PCA territory infarct. 3. Mild periventricular white matter T2/FLAIR hyperintense signal changes likely representing sequelae of chronic small vessel ischemic disease. Findings called to Dr. Person at 9:23 p.m. on 12/1/2023. Electronically Signed: Davis Dubose  12/1/2023 9:23 PM EST  Workstation ID: [id number] [employer]    XR Chest 1 View    Result Date: 12/1/2023  XR CHEST 1 VW Date of Exam: 12/1/2023 5:42 PM CST Indication: dyspnea Comparison: None available. Findings: Cardiomediastinal silhouette is unremarkable. Mild nonspecific generalized interstitial coarsening. There are few calcified granulomata. No airspace disease, pneumothorax, nor pleural effusion. No acute osseous abnormality identified.     Impression: No acute process identified Electronically Signed: Dashawn Todd MD  12/1/2023 6:04 PM CST  Workstation ID: [id number]      Chest X ray: My independent assessment showed no infiltrates or effusions    EKG: My independent evaluation showed normal sinus rhythm, no ST -T changes    Current Medications     Scheduled Meds:  [START ON 12/2/2023] aspirin, 325 mg, Oral, Daily   Or  [START ON 12/2/2023] aspirin, 300 mg, Rectal, Daily  atorvastatin, 80 mg, Oral, Nightly  [START ON 12/2/2023] cefTRIAXone, 2,000 mg, Intravenous, Q24H  [START ON 12/2/2023] clopidogrel, 75 mg, Oral, Daily  insulin glargine, 1-200 Units,  Subcutaneous, Nightly - Glucommander  [START ON 12/2/2023] insulin lispro, 1-200 Units, Subcutaneous, 4x Daily With Meals & Nightly  sodium chloride, 10 mL, Intravenous, Q12H         Continuous Infusions:          Ning Becker, [facility]  12/01/23   23:35 EST

## 2023-12-02 NOTE — PLAN OF CARE
Problem: Adult Inpatient Plan of Care  Goal: Plan of Care Review  Outcome: Ongoing, Progressing  Goal: Patient-Specific Goal (Individualized)  Outcome: Ongoing, Progressing  Goal: Absence of Hospital-Acquired Illness or Injury  Outcome: Ongoing, Progressing  Intervention: Identify and Manage Fall Risk  Recent Flowsheet Documentation  Taken 12/2/2023 1400 by Sylvie Laguna LPN  Safety Promotion/Fall Prevention:   activity supervised   assistive device/personal items within reach  Taken 12/2/2023 1200 by Sylvie Laguna LPN  Safety Promotion/Fall Prevention:   activity supervised   assistive device/personal items within reach  Taken 12/2/2023 1000 by Sylvie Laguna LPN  Safety Promotion/Fall Prevention:   activity supervised   assistive device/personal items within reach  Taken 12/2/2023 0800 by Sylvie Laguna LPN  Safety Promotion/Fall Prevention:   activity supervised   assistive device/personal items within reach  Intervention: Prevent Skin Injury  Recent Flowsheet Documentation  Taken 12/2/2023 1200 by Sylvie Laguna LPN  Body Position: position changed independently  Taken 12/2/2023 0800 by Sylvie Laguna LPN  Body Position: position changed independently  Intervention: Prevent and Manage VTE (Venous Thromboembolism) Risk  Recent Flowsheet Documentation  Taken 12/2/2023 1400 by Sylvie Laguna LPN  Activity Management:   up in chair   up ad kaecy  Taken 12/2/2023 1200 by Sylvie Laguna LPN  Activity Management: up ad kacey  VTE Prevention/Management:   bilateral   sequential compression devices off   patient refused intervention  Range of Motion: active ROM (range of motion) encouraged  Taken 12/2/2023 0800 by Sylvie Laguna LPN  Activity Management: up ad kacey  VTE Prevention/Management:   bilateral   sequential compression devices off   patient refused intervention  Range of Motion: ROM (range of motion) performed  Intervention: Prevent Infection  Recent Flowsheet Documentation  Taken 12/2/2023  1400 by Sylvie Laguna LPN  Infection Prevention:   environmental surveillance performed   equipment surfaces disinfected  Taken 12/2/2023 1200 by Sylvie Laguna LPN  Infection Prevention:   environmental surveillance performed   equipment surfaces disinfected  Taken 12/2/2023 1000 by Sylvie Laguna LPN  Infection Prevention: rest/sleep promoted  Taken 12/2/2023 0800 by Sylvie Laguna LPN  Infection Prevention:   environmental surveillance performed   equipment surfaces disinfected  Goal: Optimal Comfort and Wellbeing  Outcome: Ongoing, Progressing  Intervention: Provide Person-Centered Care  Recent Flowsheet Documentation  Taken 12/2/2023 0800 by Sylvie Laguna LPN  Trust Relationship/Rapport: care explained  Goal: Readiness for Transition of Care  Outcome: Ongoing, Progressing     Problem: Asthma Comorbidity  Goal: Maintenance of Asthma Control  Outcome: Ongoing, Progressing  Intervention: Maintain Asthma Symptom Control  Recent Flowsheet Documentation  Taken 12/2/2023 1400 by Sylvie Laguna LPN  Medication Review/Management: medications reviewed  Taken 12/2/2023 1200 by Sylvie Laguna LPN  Medication Review/Management: medications reviewed  Taken 12/2/2023 1000 by Sylvie Lagnua LPN  Medication Review/Management: medications reviewed  Taken 12/2/2023 0800 by Sylvie Laguna LPN  Medication Review/Management: medications reviewed     Problem: Behavioral Health Comorbidity  Goal: Maintenance of Behavioral Health Symptom Control  Outcome: Ongoing, Progressing  Intervention: Maintain Behavioral Health Symptom Control  Recent Flowsheet Documentation  Taken 12/2/2023 1400 by Sylvie Laguna LPN  Medication Review/Management: medications reviewed  Taken 12/2/2023 1200 by Sylvie Laguna LPN  Medication Review/Management: medications reviewed  Taken 12/2/2023 1000 by Sylvie Laguna LPN  Medication Review/Management: medications reviewed  Taken 12/2/2023 0800 by Sylvie Laguna LPN  Medication  Review/Management: medications reviewed     Problem: Diabetes Comorbidity  Goal: Blood Glucose Level Within Targeted Range  Outcome: Ongoing, Progressing     Problem: Hypertension Comorbidity  Goal: Blood Pressure in Desired Range  Outcome: Ongoing, Progressing  Intervention: Maintain Blood Pressure Management  Recent Flowsheet Documentation  Taken 12/2/2023 1400 by Sylvie Laguna LPN  Medication Review/Management: medications reviewed  Taken 12/2/2023 1200 by Sylvie Laguna LPN  Medication Review/Management: medications reviewed  Taken 12/2/2023 1000 by Sylvie Laguna LPN  Medication Review/Management: medications reviewed  Taken 12/2/2023 0800 by Sylvie Laguna LPN  Medication Review/Management: medications reviewed   Goal Outcome Evaluation:

## 2023-12-02 NOTE — NURSING NOTE
was able to convince the patient that she can not keep her home meds with her. She submitted the med to be taken to the pharmacy. Same was bagged, sealed,labelled and sent to the pharmacy.

## 2023-12-03 LAB
ALBUMIN SERPL-MCNC: 4 G/DL (ref 3.5–5.2)
ALBUMIN/GLOB SERPL: 1.3 G/DL
ALP SERPL-CCNC: 119 U/L (ref 39–117)
ALT SERPL W P-5'-P-CCNC: 96 U/L (ref 1–33)
ANION GAP SERPL CALCULATED.3IONS-SCNC: 14 MMOL/L (ref 5–15)
AST SERPL-CCNC: 100 U/L (ref 1–32)
BACTERIA SPEC AEROBE CULT: NORMAL
BILIRUB SERPL-MCNC: 0.2 MG/DL (ref 0–1.2)
BUN SERPL-MCNC: 28 MG/DL (ref 8–23)
BUN/CREAT SERPL: 21.9 (ref 7–25)
CALCIUM SPEC-SCNC: 9.9 MG/DL (ref 8.6–10.5)
CHLORIDE SERPL-SCNC: 95 MMOL/L (ref 98–107)
CO2 SERPL-SCNC: 28 MMOL/L (ref 22–29)
CREAT SERPL-MCNC: 1.28 MG/DL (ref 0.57–1)
DEPRECATED RDW RBC AUTO: 51.6 FL (ref 37–54)
EGFRCR SERPLBLD CKD-EPI 2021: 43.8 ML/MIN/1.73
ERYTHROCYTE [DISTWIDTH] IN BLOOD BY AUTOMATED COUNT: 17.1 % (ref 12.3–15.4)
FOLATE SERPL-MCNC: >20 NG/ML (ref 4.78–24.2)
GLOBULIN UR ELPH-MCNC: 3.2 GM/DL
GLUCOSE BLDC GLUCOMTR-MCNC: 194 MG/DL (ref 70–105)
GLUCOSE BLDC GLUCOMTR-MCNC: 213 MG/DL (ref 70–105)
GLUCOSE BLDC GLUCOMTR-MCNC: 254 MG/DL (ref 70–105)
GLUCOSE BLDC GLUCOMTR-MCNC: 287 MG/DL (ref 70–105)
GLUCOSE SERPL-MCNC: 226 MG/DL (ref 65–99)
HCT VFR BLD AUTO: 42.1 % (ref 34–46.6)
HGB BLD-MCNC: 13.6 G/DL (ref 12–15.9)
MCH RBC QN AUTO: 28.5 PG (ref 26.6–33)
MCHC RBC AUTO-ENTMCNC: 32.4 G/DL (ref 31.5–35.7)
MCV RBC AUTO: 88 FL (ref 79–97)
PLATELET # BLD AUTO: 315 10*3/MM3 (ref 140–450)
PMV BLD AUTO: 7.8 FL (ref 6–12)
POTASSIUM SERPL-SCNC: 3.7 MMOL/L (ref 3.5–5.2)
PROT SERPL-MCNC: 7.2 G/DL (ref 6–8.5)
RBC # BLD AUTO: 4.78 10*6/MM3 (ref 3.77–5.28)
SODIUM SERPL-SCNC: 137 MMOL/L (ref 136–145)
WBC NRBC COR # BLD AUTO: 8 10*3/MM3 (ref 3.4–10.8)

## 2023-12-03 PROCEDURE — 97166 OT EVAL MOD COMPLEX 45 MIN: CPT

## 2023-12-03 PROCEDURE — 85027 COMPLETE CBC AUTOMATED: CPT | Performed by: STUDENT IN AN ORGANIZED HEALTH CARE EDUCATION/TRAINING PROGRAM

## 2023-12-03 PROCEDURE — 80053 COMPREHEN METABOLIC PANEL: CPT | Performed by: STUDENT IN AN ORGANIZED HEALTH CARE EDUCATION/TRAINING PROGRAM

## 2023-12-03 PROCEDURE — 63710000001 INSULIN LISPRO (HUMAN) PER 5 UNITS: Performed by: HOSPITALIST

## 2023-12-03 PROCEDURE — 36415 COLL VENOUS BLD VENIPUNCTURE: CPT | Performed by: STUDENT IN AN ORGANIZED HEALTH CARE EDUCATION/TRAINING PROGRAM

## 2023-12-03 PROCEDURE — 97162 PT EVAL MOD COMPLEX 30 MIN: CPT

## 2023-12-03 PROCEDURE — 25010000002 CEFTRIAXONE PER 250 MG: Performed by: STUDENT IN AN ORGANIZED HEALTH CARE EDUCATION/TRAINING PROGRAM

## 2023-12-03 PROCEDURE — 63710000001 INSULIN GLARGINE PER 5 UNITS: Performed by: HOSPITALIST

## 2023-12-03 PROCEDURE — 82948 REAGENT STRIP/BLOOD GLUCOSE: CPT

## 2023-12-03 RX ORDER — ASPIRIN 81 MG/1
81 TABLET, CHEWABLE ORAL DAILY
Status: DISCONTINUED | OUTPATIENT
Start: 2023-12-03 | End: 2023-12-03

## 2023-12-03 RX ORDER — ASPIRIN 81 MG/1
81 TABLET, CHEWABLE ORAL DAILY
Status: DISCONTINUED | OUTPATIENT
Start: 2023-12-04 | End: 2023-12-04 | Stop reason: HOSPADM

## 2023-12-03 RX ADMIN — TOPIRAMATE 25 MG: 25 TABLET, FILM COATED ORAL at 08:57

## 2023-12-03 RX ADMIN — Medication 10 ML: at 08:58

## 2023-12-03 RX ADMIN — TICAGRELOR 180 MG: 90 TABLET ORAL at 20:38

## 2023-12-03 RX ADMIN — TICAGRELOR 90 MG: 90 TABLET ORAL at 21:00

## 2023-12-03 RX ADMIN — ROSUVASTATIN 20 MG: 10 TABLET, FILM COATED ORAL at 20:38

## 2023-12-03 RX ADMIN — CARVEDILOL 6.25 MG: 6.25 TABLET, FILM COATED ORAL at 08:57

## 2023-12-03 RX ADMIN — Medication 10 MG: at 05:54

## 2023-12-03 RX ADMIN — ALPRAZOLAM 0.12 MG: 0.25 TABLET ORAL at 08:56

## 2023-12-03 RX ADMIN — INSULIN GLARGINE 25 UNITS: 100 INJECTION, SOLUTION SUBCUTANEOUS at 21:15

## 2023-12-03 RX ADMIN — CARVEDILOL 6.25 MG: 6.25 TABLET, FILM COATED ORAL at 18:37

## 2023-12-03 RX ADMIN — PANTOPRAZOLE SODIUM 40 MG: 40 TABLET, DELAYED RELEASE ORAL at 08:56

## 2023-12-03 RX ADMIN — MONTELUKAST 10 MG: 10 TABLET, FILM COATED ORAL at 20:38

## 2023-12-03 RX ADMIN — TRIAMTERENE AND HYDROCHLOROTHIAZIDE 1 TABLET: 50; 75 TABLET ORAL at 08:57

## 2023-12-03 RX ADMIN — TOPIRAMATE 25 MG: 25 TABLET, FILM COATED ORAL at 20:38

## 2023-12-03 RX ADMIN — CEFTRIAXONE 2000 MG: 2 INJECTION, POWDER, FOR SOLUTION INTRAMUSCULAR; INTRAVENOUS at 21:11

## 2023-12-03 RX ADMIN — LEVOTHYROXINE SODIUM 50 MCG: 0.05 TABLET ORAL at 05:52

## 2023-12-03 RX ADMIN — INSULIN LISPRO 10 UNITS: 100 INJECTION, SOLUTION INTRAVENOUS; SUBCUTANEOUS at 12:32

## 2023-12-03 RX ADMIN — ALPRAZOLAM 0.12 MG: 0.25 TABLET ORAL at 20:39

## 2023-12-03 RX ADMIN — ASPIRIN 325 MG ORAL TABLET 325 MG: 325 PILL ORAL at 08:56

## 2023-12-03 RX ADMIN — PAROXETINE HYDROCHLORIDE 20 MG: 20 TABLET, FILM COATED ORAL at 08:57

## 2023-12-03 RX ADMIN — Medication 10 ML: at 20:39

## 2023-12-03 RX ADMIN — CLOPIDOGREL BISULFATE 75 MG: 75 TABLET ORAL at 08:56

## 2023-12-03 RX ADMIN — INSULIN LISPRO 10 UNITS: 100 INJECTION, SOLUTION INTRAVENOUS; SUBCUTANEOUS at 08:57

## 2023-12-03 RX ADMIN — INSULIN LISPRO 9 UNITS: 100 INJECTION, SOLUTION INTRAVENOUS; SUBCUTANEOUS at 18:37

## 2023-12-03 RX ADMIN — Medication 10 MG: at 00:26

## 2023-12-03 RX ADMIN — INSULIN LISPRO 4 UNITS: 100 INJECTION, SOLUTION INTRAVENOUS; SUBCUTANEOUS at 21:14

## 2023-12-03 NOTE — PROGRESS NOTES
[facility] Medicine Services Daily Progress Note    Patient Name: Lula Burnett  : 1950  MRN: [id number]  Primary Care Physician:  Val Irvin MD  Date of admission: 2023      Subjective      Chief Complaint: left pontine stroke.    Subjective   Denies for any new complaint. No nausea or vomiting. No chest pain.    Review of Systems   All other systems reviewed and are negative.         Objective      Vitals:   Temp:  [97.4 °F (36.3 °C)-98.2 °F (36.8 °C)] 97.4 °F (36.3 °C)  Heart Rate:  [42-83] 67  Resp:  [15-26] 15  BP: (146-182)/(65-81) 146/66    Physical Exam  Vitals and nursing note reviewed.   Constitutional:       General: She is not in acute distress.     Appearance: Normal appearance. She is well-developed. She is not ill-appearing, toxic-appearing or diaphoretic.   HENT:      Head: Normocephalic and atraumatic.      Right Ear: Ear canal and external ear normal.      Left Ear: Ear canal and external ear normal.      Nose: Nose normal. No congestion or rhinorrhea.      Mouth/Throat:      Mouth: Mucous membranes are moist.      Pharynx: No oropharyngeal exudate.   Eyes:      General: No scleral icterus.        Right eye: No discharge.         Left eye: No discharge.      Extraocular Movements: Extraocular movements intact.      Conjunctiva/sclera: Conjunctivae normal.      Pupils: Pupils are equal, round, and reactive to light.   Neck:      Thyroid: No thyromegaly.      Vascular: No carotid bruit or JVD.      Trachea: No tracheal deviation.   Cardiovascular:      Rate and Rhythm: Normal rate and regular rhythm.      Pulses: Normal pulses.      Heart sounds: Normal heart sounds. No murmur heard.     No friction rub. No gallop.   Pulmonary:      Effort: Pulmonary effort is normal. No respiratory distress.      Breath sounds: Normal breath sounds. No stridor. No wheezing, rhonchi or rales.   Chest:      Chest wall: No tenderness.   Abdominal:      General:  Bowel sounds are normal. There is no distension.      Palpations: Abdomen is soft. There is no mass.      Tenderness: There is no abdominal tenderness. There is no guarding or rebound.      Hernia: No hernia is present.   Musculoskeletal:         General: No swelling, tenderness, deformity or signs of injury. Normal range of motion.      Cervical back: Normal range of motion and neck supple. No rigidity. No muscular tenderness.      Right lower leg: No edema.      Left lower leg: No edema.   Lymphadenopathy:      Cervical: No cervical adenopathy.   Skin:     General: Skin is warm and dry.      Coloration: Skin is not jaundiced or pale.      Findings: No bruising, erythema or rash.   Neurological:      General: No focal deficit present.      Mental Status: She is alert and oriented to person, place, and time. Mental status is at baseline.      Cranial Nerves: No cranial nerve deficit.      Sensory: No sensory deficit.      Motor: No weakness or abnormal muscle tone.      Coordination: Coordination normal.   Psychiatric:         Mood and Affect: Mood normal.         Behavior: Behavior normal.         Thought Content: Thought content normal.         Judgment: Judgment normal.             Result Review    Result Review:  I have personally reviewed the results from the time of this admission to 12/3/2023 13:26 EST and agree with these findings:  [x]  Laboratory  [x]  Microbiology  [x]  Radiology  [x]  EKG/Telemetry   []  Cardiology/Vascular   []  Pathology  []  Old records  []  Other:  Most notable findings include:     Wounds (last 24 hours)               Assessment & Plan      Brief Patient Summary:  Lula Burnett is a 73 y.o. female who       ALPRAZolam, 0.125 mg, Oral, BID  [START ON 12/4/2023] aspirin, 81 mg, Oral, Daily  carvedilol, 6.25 mg, Oral, BID With Meals  cefTRIAXone, 2,000 mg, Intravenous, Q24H  insulin glargine, 1-200 Units, Subcutaneous, Nightly - Glucommander  insulin lispro, 1-200 Units,  Subcutaneous, 4x Daily With Meals & Nightly  levothyroxine, 50 mcg, Oral, Q AM  montelukast, 10 mg, Oral, Nightly  pantoprazole, 40 mg, Oral, Daily  PARoxetine, 20 mg, Oral, Daily  rosuvastatin, 20 mg, Oral, Nightly  sodium chloride, 10 mL, Intravenous, Q12H  ticagrelor, 180 mg, Oral, Once  ticagrelor, 90 mg, Oral, BID  topiramate, 25 mg, Oral, BID  triamterene-hydrochlorothiazide, 1 tablet, Oral, Daily             Active Hospital Problems:  Active Hospital Problems    Diagnosis     **Left pontine stroke     UTI (urinary tract infection), bacterial     Transaminitis     Memory impairment     Paroxysmal atrial fibrillation     Class 1 obesity in adult     Diabetes     Diabetic neuropathy     GERD (gastroesophageal reflux disease)     Hypertension     Hypothyroidism     Mitral stenosis     Mixed hyperlipidemia     Anxiety     Depression      Plan:     Stroke/TIA  -Reviewed imaging   -EKG reviewed  -ECHO  -TSH,  Lipid panel, A1C, Vit B12  -Accu-Checks every 6  -Neuro checks q2  -NIH q shift  -MRI brain positive for acute stroke.  -PT/OT evaluate and treat. ? Rehab vs home.  -Continue/start statin  -Continue ASA  -Avoid hydralazine for BP control  -No hypotonic fluids  -Neurology consult ... Advised Asa and Brillinta.    for safe discharge planning.       UTI  -UA reviewed  -Continue Traxam     Transaminitis  -Hepatitis panel  -Iron studies  -US liver  -Repeat LFTs in am     Diabetes mellitus type 2, chronic  -A1C  --Hold home oral diabetes medications for now  -Start SSI  -Accu-checks every 6 hours  -Diabetic educator due to elevated blood sugar on admission     Chronic conditions: Hypertension, hyperlipidemia, hypothyroid, paroxysmal atrial fibrillation  - Continue home medications as appropriate  - Monitor routine labs and vital signs     Medical Intervention Limits: NO intubation (DNI)  Code Status (Patient has no pulse and is not breathing): No CPR (Do Not Attempt to Resuscitate)  Medical Interventions  (Patient has pulse or is breathing): Limited Support        Nutrition:   NPO Diet NPO Type: Strict NPO      DVT prophylaxis:  Mechanical DVT prophylaxis orders are present.       DVT prophylaxis:  Mechanical DVT prophylaxis orders are present.    CODE STATUS:    Medical Intervention Limits: NO intubation (DNI)  Code Status (Patient has no pulse and is not breathing): No CPR (Do Not Attempt to Resuscitate)  Medical Interventions (Patient has pulse or is breathing): Limited Support      Disposition:  I expect patient to be discharged as per clinical course.    This patient has been examined wearing appropriate Personal Protective Equipment and discussed with hospital infection control department. 12/03/23      Electronically signed by Taco Oakes MD, 12/03/23, 13:26 EST.  [facility] Hospitalist Team

## 2023-12-03 NOTE — THERAPY EVALUATION
Patient Name: Lula Burnett  : 1950    MRN: [id number]                              Today's Date: 12/3/2023       Admit Date: 2023    Visit Dx:     ICD-10-CM ICD-9-CM   1. Left pontine stroke  I63.9 434.91   2. Imbalance due to old stroke  I69.398 438.89    R26.89 781.2   3. Acute urinary tract infection  N39.0 599.0   4. Altered mental status, unspecified altered mental status type  R41.82 780.97     Patient Active Problem List   Diagnosis    Diabetes    Diabetic neuropathy    GERD (gastroesophageal reflux disease)    Hypertension    Hypothyroidism    Mitral stenosis    Mixed hyperlipidemia    Anxiety    Depression    Class 1 obesity in adult    Memory impairment    Paroxysmal atrial fibrillation    Left pontine stroke    UTI (urinary tract infection), bacterial    Transaminitis     Past Medical History:   Diagnosis Date    Anxiety     Asthma     COVID-19     Diagnosed with positive test 22.    CVA (cerebral vascular accident)     x3    Depression     Diabetes     Diabetic neuropathy     Former smoker     GERD (gastroesophageal reflux disease)     Hypertension     Hypothyroidism     Mitral stenosis     Mitral valve annular calcification     Peptic ulceration     PONV (postoperative nausea and vomiting)     Psoriasis     Vertigo      Past Surgical History:   Procedure Laterality Date    CARDIAC CATHETERIZATION      CARDIAC CATHETERIZATION N/A 2022    Procedure: Coronary angiography;  Surgeon: Eugene Lundy MD;  Location:  [facility];  Service: Cardiovascular;  Laterality: N/A;    CARDIAC CATHETERIZATION N/A 2022    Procedure: Left heart cath;  Surgeon: Eugene Lundy MD;  Location:  [facility];  Service: Cardiovascular;  Laterality: N/A;    CARDIAC CATHETERIZATION N/A 2022    Procedure: Left ventriculography;  Surgeon: Eugene Lundy MD;  Location:  [facility]ATION;  Service: Cardiovascular;  Laterality: N/A;     CARDIAC ELECTROPHYSIOLOGY PROCEDURE N/A 02/07/2022    Procedure: LOOP INSERTION linq;  Surgeon: Eugene Lundy MD;  Location: [facility] INVASIVE LOCATION;  Service: Cardiovascular;  Laterality: N/A;    CHOLECYSTECTOMY  02/2000    HYSTERECTOMY  1980      General Information       Row Name 12/03/23 [facility]          OT Time and Intention    Document Type evaluation  -MS     Mode of Treatment occupational therapy  -MS       Row Name 12/03/23 [facility]          General Information    Patient Profile Reviewed yes  -MS     Prior Level of Function independent:;ADL's;driving;all household mobility;community mobility  -MS     Existing Precautions/Restrictions no known precautions/restrictions  -MS     Barriers to Rehab --  hx x3 CVA  -MS       Row Name 12/03/23 [facility]          Occupational Profile    Reason for Services/Referral (Occupational Profile) Pt is a 72 y/o F admitted to [facility] 12/1/23 with c/o R sided numbness. Hospital dx L pontine CVA. CXR (-) acute. MRI brain (+) small 4 mm restricted diffusion within dorsal aspect of jose near L cerebellar peduncles likely representing small acute/subacute infarct, encephalomalacia within R occipital lobe corresponding to chronic R PCA territory infarct. US liver (+) 1.5cm hypoechoic liver lesion CTA head indicates no intracranial large vessel occlusion. PMHx significant for x3 CVA with memory loss, DM, anxiety, depression, and A. Fib. At baseline pt resides in , independent with ADLs and no AD for mobility. Pt reports baseline has slight decrease in balance and occasional dizziness.  -MS       Row Name 12/03/23 [facility]          Living Environment    People in Home alone  -MS       Row Name 12/03/23 [facility]          Home Main Entrance    Number of Stairs, Main Entrance two  -MS       Row Name 12/03/23 [facility]          Stairs Within Home, Primary    Stairs, Within Home, Primary 3 steps to bedroom, 2 steps to kitchen  -MS     Number of Stairs, Within Home, Primary two;three  -MS       Row  Name 12/03/23 1353          Cognition    Orientation Status (Cognition) oriented x 4  -MS       Row Name 12/03/23 1353          Safety Issues, Functional Mobility    Impairments Affecting Function (Mobility) balance  -MS               User Key  (r) = Recorded By, (t) = Taken By, (c) = Cosigned By      Initials Name Provider Type    Julieth Christiasnon OT Occupational Therapist                     Mobility/ADL's       Row Name 12/03/23 1355          Bed Mobility    Bed Mobility bed mobility (all) activities  -MS     All Activities, [place] (Bed Mobility) independent  -MS       Row Name 12/03/23 1355          Transfers    Transfers sit-stand transfer  -MS       Row Name 12/03/23 1355          Sit-Stand Transfer    Sit-Stand [place] (Transfers) independent  -MS       Row Name 12/03/23 1355          Activities of Daily Living    BADL Assessment/Intervention lower body dressing  -MS       Row Name 12/03/23 1355          Lower Body Dressing Assessment/Training    [place] Level (Lower Body Dressing) don;socks;independent  -MS     Position (Lower Body Dressing) edge of bed sitting  -MS               User Key  (r) = Recorded By, (t) = Taken By, (c) = Cosigned By      Initials Name Provider Type    Julieth Christianson OT Occupational Therapist                   Obj/Interventions       Row Name 12/03/23 1355          Sensory Assessment (Somatosensory)    Sensory Assessment (Somatosensory) UE sensation intact  -MS       Row Name 12/03/23 1355          Vision Assessment/Intervention    Visual Impairment/Limitations WFL  -MS       Row Name 12/03/23 1355          Range of Motion Comprehensive    General Range of Motion no range of motion deficits identified  -MS       Row Name 12/03/23 1355          Strength Comprehensive (MMT)    General Manual Muscle Testing (MMT) Assessment no strength deficits identified  -MS       Row Name 12/03/23 1355          Balance    Balance Assessment sitting static balance;sitting dynamic  balance;standing static balance;standing dynamic balance  -MS     Static Sitting Balance independent  -MS     Dynamic Sitting Balance independent  -MS     Position, Sitting Balance unsupported  -MS     Static Standing Balance independent  -MS     Dynamic Standing Balance independent  -MS     Position/Device Used, Standing Balance unsupported  -MS               User Key  (r) = Recorded By, (t) = Taken By, (c) = Cosigned By      Initials Name Provider Type    Julieth Christianson, OT Occupational Therapist                   Goals/Plan    No documentation.                  Clinical Impression       Row Name 12/03/23 1355          Pain Assessment    Pretreatment Pain Rating 0/10 - no pain  -MS     Posttreatment Pain Rating 0/10 - no pain  -MS       Row Name 12/03/23 1357          Plan of Care Review    Plan of Care Reviewed With patient  -MS     Progress no change  -MS     Outcome Evaluation Pt is a 74 y/o F admitted to [facility] 12/1/23 with c/o R sided numbness. Hospital dx L pontine CVA. CXR (-) acute. MRI brain (+) small 4 mm restricted diffusion within dorsal aspect of jose near L cerebellar peduncles likely representing small acute/subacute infarct, encephalomalacia within R occipital lobe corresponding to chronic R PCA territory infarct. US liver (+) 1.5cm hypoechoic liver lesion CTA head indicates no intracranial large vessel occlusion. PMHx significant for x3 CVA with memory loss, DM, anxiety, depression, and A. Fib. At baseline pt resides in RV, independent with ADLs and no AD for mobility. Pt reports baseline has slight decrease in balance and occasional dizziness. This date, pt A&Ox4 on RA and in supine upon arrival. Pt reports all complaints have resolved, no numbness/tinging/visual disturbances. Pt completes bed mobility, functional transfers and functional mobility (I). Pt demo slight decrease in balance with dynamic standing, however able to self correct, reports this is at baseline. Pt does not demo deficits  that warrant further skilled OT intervention and likely safe to dc home. OT will complete orders at this time.  -MS       Row Name 12/03/23 1359          Therapy Assessment/Plan (OT)    Criteria for Skilled Therapeutic Interventions Met (OT) no;no problems identified which require skilled intervention  -MS     Therapy Frequency (OT) evaluation only  -MS       Row Name 12/03/23 1350          Therapy Plan Review/Discharge Plan (OT)    Anticipated Discharge Disposition (OT) home  -MS       Row Name 12/03/23 1356          Vital Signs    Intra Systolic BP Rehab 142  -MS     Intra Treatment Diastolic BP 62  -MS     Post Systolic BP Rehab 130  -MS     Post Treatment Diastolic BP 60  -MS     Pretreatment Heart Rate (beats/min) 74  -MS     Posttreatment Heart Rate (beats/min) 97  -MS     Pre SpO2 (%) 96  -MS     O2 Delivery Pre Treatment room air  -MS     O2 Delivery Intra Treatment room air  -MS     Post SpO2 (%) 97  -MS     O2 Delivery Post Treatment room air  -MS     Pre Patient Position Supine  -MS     Intra Patient Position Standing  -MS     Post Patient Position Supine  -MS     Recovery Time VSS  -MS       Row Name 12/03/23 2785          Positioning and Restraints    Pre-Treatment Position in bed  -MS     Post Treatment Position bed  -MS     In Bed supine;notified nsg;call light within reach;encouraged to call for assist  -MS               User Key  (r) = Recorded By, (t) = Taken By, (c) = Cosigned By      Initials Name Provider Type    Julieth Christianson, OT Occupational Therapist                   Outcome Measures       Row Name 12/03/23 0890          How much help from another is currently needed...    Putting on and taking off regular lower body clothing? 4  -MS     Bathing (including washing, rinsing, and drying) 4  -MS     Toileting (which includes using toilet bed pan or urinal) 4  -MS     Putting on and taking off regular upper body clothing 4  -MS     Taking care of personal grooming (such as brushing teeth) 4   -MS     Eating meals 4  -MS     AM-PAC 6 Clicks Score (OT) 24  -MS       Row Name 12/03/23 1358          Functional Assessment    Outcome Measure Options AM-PAC 6 Clicks Daily Activity (OT)  -MS               User Key  (r) = Recorded By, (t) = Taken By, (c) = Cosigned By      Initials Name Provider Type    MS Julieth Carrillo OT Occupational Therapist                    Occupational Therapy Education       Title: PT OT SLP Therapies (Done)       Topic: Occupational Therapy (Done)       Point: Precautions (Done)       Description:   Instruct learner(s) on prescribed precautions during self-care and functional transfers.                  Learning Progress Summary             Patient Acceptance, E,TB, VU by MS at 12/3/2023 1359                         Point: Body mechanics (Done)       Description:   Instruct learner(s) on proper positioning and spine alignment during self-care, functional mobility activities and/or exercises.                  Learning Progress Summary             Patient Acceptance, E,TB, VU by MS at 12/3/2023 1359                                         User Key       Initials Effective Dates Name Provider Type Discipline    MS 07/13/22 -  Julieth Carrillo OT Occupational Therapist OT                  OT Recommendation and Plan  Therapy Frequency (OT): evaluation only  Plan of Care Review  Plan of Care Reviewed With: patient  Progress: no change  Outcome Evaluation: Pt is a 72 y/o F admitted to [facility] 12/1/23 with c/o R sided numbness. Hospital dx L pontine CVA. CXR (-) acute. MRI brain (+) small 4 mm restricted diffusion within dorsal aspect of jose near L cerebellar peduncles likely representing small acute/subacute infarct, encephalomalacia within R occipital lobe corresponding to chronic R PCA territory infarct. US liver (+) 1.5cm hypoechoic liver lesion CTA head indicates no intracranial large vessel occlusion. PMHx significant for x3 CVA with memory loss, DM, anxiety, depression, and A. Fib. At  baseline pt resides in RV, independent with ADLs and no AD for mobility. Pt reports baseline has slight decrease in balance and occasional dizziness. This date, pt A&Ox4 on RA and in supine upon arrival. Pt reports all complaints have resolved, no numbness/tinging/visual disturbances. Pt completes bed mobility, functional transfers and functional mobility (I). Pt demo slight decrease in balance with dynamic standing, however able to self correct, reports this is at baseline. Pt does not demo deficits that warrant further skilled OT intervention and likely safe to dc home. OT will complete orders at this time.     Time Calculation:                   Julieth Carrillo OT  12/3/2023

## 2023-12-03 NOTE — PLAN OF CARE
Problem: Adult Inpatient Plan of Care  Goal: Plan of Care Review  Outcome: Ongoing, Progressing  Goal: Patient-Specific Goal (Individualized)  Outcome: Ongoing, Progressing  Goal: Absence of Hospital-Acquired Illness or Injury  Outcome: Ongoing, Progressing  Intervention: Identify and Manage Fall Risk  Recent Flowsheet Documentation  Taken 12/3/2023 1600 by Sylvie Laguna LPN  Safety Promotion/Fall Prevention:   activity supervised   assistive device/personal items within reach  Taken 12/3/2023 1400 by Sylvie Laguna LPN  Safety Promotion/Fall Prevention:   activity supervised   assistive device/personal items within reach  Taken 12/3/2023 1200 by Sylvie Laguna LPN  Safety Promotion/Fall Prevention:   activity supervised   assistive device/personal items within reach  Taken 12/3/2023 1000 by Sylvie Laguna LPN  Safety Promotion/Fall Prevention:   activity supervised   assistive device/personal items within reach  Taken 12/3/2023 0800 by Sylvie Laguna LPN  Safety Promotion/Fall Prevention:   activity supervised   assistive device/personal items within reach  Intervention: Prevent Skin Injury  Recent Flowsheet Documentation  Taken 12/3/2023 0800 by Sylvie Laguna LPN  Body Position: position changed independently  Intervention: Prevent and Manage VTE (Venous Thromboembolism) Risk  Recent Flowsheet Documentation  Taken 12/3/2023 1600 by Sylvie Laguna LPN  Activity Management: up ad kacey  Range of Motion: active ROM (range of motion) encouraged  Taken 12/3/2023 1200 by Sylvie Laguna LPN  VTE Prevention/Management:   bilateral   sequential compression devices off   patient refused intervention  Range of Motion: active ROM (range of motion) encouraged  Taken 12/3/2023 0800 by Sylvie Laguna LPN  Activity Management: up ad kacey  VTE Prevention/Management:   bilateral   sequential compression devices off   patient refused intervention  Range of Motion: active ROM (range of motion)  encouraged  Intervention: Prevent Infection  Recent Flowsheet Documentation  Taken 12/3/2023 1600 by Sylvie Laguna LPN  Infection Prevention:   environmental surveillance performed   equipment surfaces disinfected  Taken 12/3/2023 1400 by Sylvie Laguna LPN  Infection Prevention:   equipment surfaces disinfected   environmental surveillance performed  Taken 12/3/2023 1200 by Sylvie Laguna LPN  Infection Prevention:   environmental surveillance performed   equipment surfaces disinfected  Taken 12/3/2023 1000 by Sylvie Laguna LPN  Infection Prevention:   environmental surveillance performed   equipment surfaces disinfected  Taken 12/3/2023 0800 by Sylvie Laguna LPN  Infection Prevention:   environmental surveillance performed   equipment surfaces disinfected  Goal: Optimal Comfort and Wellbeing  Outcome: Ongoing, Progressing  Goal: Readiness for Transition of Care  Outcome: Ongoing, Progressing     Problem: Asthma Comorbidity  Goal: Maintenance of Asthma Control  Outcome: Ongoing, Progressing  Intervention: Maintain Asthma Symptom Control  Recent Flowsheet Documentation  Taken 12/3/2023 1600 by Sylvie Laguna LPN  Medication Review/Management: medications reviewed  Taken 12/3/2023 1400 by Sylvie Laguna LPN  Medication Review/Management: medications reviewed  Taken 12/3/2023 1200 by Sylvie Laguna LPN  Medication Review/Management: medications reviewed  Taken 12/3/2023 1000 by Sylvie Laguna LPN  Medication Review/Management: medications reviewed  Taken 12/3/2023 0800 by Sylvie Laguna LPN  Medication Review/Management: medications reviewed     Problem: Behavioral Health Comorbidity  Goal: Maintenance of Behavioral Health Symptom Control  Outcome: Ongoing, Progressing  Intervention: Maintain Behavioral Health Symptom Control  Recent Flowsheet Documentation  Taken 12/3/2023 1600 by Sylvie Laguna LPN  Medication Review/Management: medications reviewed  Taken 12/3/2023 1400 by Sylvie Laguna  LPN  Medication Review/Management: medications reviewed  Taken 12/3/2023 1200 by Sylvie Laguna LPN  Medication Review/Management: medications reviewed  Taken 12/3/2023 1000 by Sylvie Laguna LPN  Medication Review/Management: medications reviewed  Taken 12/3/2023 0800 by Sylvie Laguna LPN  Medication Review/Management: medications reviewed     Problem: Diabetes Comorbidity  Goal: Blood Glucose Level Within Targeted Range  Outcome: Ongoing, Progressing     Problem: Hypertension Comorbidity  Goal: Blood Pressure in Desired Range  Outcome: Ongoing, Progressing  Intervention: Maintain Blood Pressure Management  Recent Flowsheet Documentation  Taken 12/3/2023 1600 by Sylvie Laguna LPN  Medication Review/Management: medications reviewed  Taken 12/3/2023 1400 by Sylvie Laguna LPN  Medication Review/Management: medications reviewed  Taken 12/3/2023 1200 by Sylvie Laguna LPN  Medication Review/Management: medications reviewed  Taken 12/3/2023 1000 by Sylvie Laguna LPN  Medication Review/Management: medications reviewed  Taken 12/3/2023 0800 by Sylvie Laguna LPN  Medication Review/Management: medications reviewed   Goal Outcome Evaluation:

## 2023-12-03 NOTE — PROGRESS NOTES
Patient doing very well    No new neurologic symptoms    Her platelet function analysis for Plavix was abnormal, elevated sodium means not working    I talked to admitting I talked to his son over the phone        One of the advisory is to switch her to Brilinta and add baby aspirin.  The patient previously has said that she has bruising, I gave her aspirin here and nothing happens so I am hoping she is going to be fine but I explained to her son who is also the power of , that anytime you combine these kind of medication there is a propensity for bruising and even bleeding with consequences.    She does not meet the criteria for anticoagulation right now, her stroke was small vessel type.    Along with that modification of stroke factors apply and she needs to take care of her diet and diabetes and cholesterol etc. etc.    Modification of stroke risk factors:   - Blood pressure should be less than 130/80 outpatient, HbA1c less than 6.5, LDL less than 70; b12>500 and smoking cessation if applicable. We would be grateful if the primary team / primary care physician keep a close watch on this above targets.  - Stroke education  - Follow up with neurologist of choice

## 2023-12-03 NOTE — PLAN OF CARE
Goal Outcome Evaluation:  Plan of Care Reviewed With: patient        Progress: no change  Outcome Evaluation: Pt is a 72 y/o F admitted to [facility] 12/1/23 with c/o R sided numbness. Hospital dx L pontine CVA. CXR (-) acute. MRI brain (+) small 4 mm restricted diffusion within dorsal aspect of jose near L cerebellar peduncles likely representing small acute/subacute infarct, encephalomalacia within R occipital lobe corresponding to chronic R PCA territory infarct. US liver (+) 1.5cm hypoechoic liver lesion CTA head indicates no intracranial large vessel occlusion. PMHx significant for x3 CVA with memory loss, DM, anxiety, depression, and A. Fib. At baseline pt resides in RV, independent with ADLs and no AD for mobility. Pt reports baseline has slight decrease in balance and occasional dizziness. This date, pt A&Ox4 on RA and in supine upon arrival. Pt reports all complaints have resolved, no numbness/tinging/visual disturbances. Pt completes bed mobility, functional transfers and functional mobility (I). Pt demo slight decrease in balance with dynamic standing, however able to self correct, reports this is at baseline. Pt does not demo deficits that warrant further skilled OT intervention and likely safe to dc home. OT will complete orders at this time.      Anticipated Discharge Disposition (OT): home

## 2023-12-03 NOTE — PLAN OF CARE
Goal Outcome Evaluation:  Plan of Care Reviewed With: patient      Discussed the importance of maintaining good blood pressure control by monitoring her blood pressure at home and taking her medication as prescribed by the dr.  Also monitoring her blood glucose level by watching how many carbs she is eating, and following her endocrinologist recommendation on insulin.

## 2023-12-03 NOTE — THERAPY EVALUATION
Patient Name: Lula Burnett  : 1950    MRN: [id number]                              Today's Date: 12/3/2023       Admit Date: 2023    Visit Dx:     ICD-10-CM ICD-9-CM   1. Left pontine stroke  I63.9 434.91   2. Imbalance due to old stroke  I69.398 438.89    R26.89 781.2   3. Acute urinary tract infection  N39.0 599.0   4. Altered mental status, unspecified altered mental status type  R41.82 780.97     Patient Active Problem List   Diagnosis    Diabetes    Diabetic neuropathy    GERD (gastroesophageal reflux disease)    Hypertension    Hypothyroidism    Mitral stenosis    Mixed hyperlipidemia    Anxiety    Depression    Class 1 obesity in adult    Memory impairment    Paroxysmal atrial fibrillation    Left pontine stroke    UTI (urinary tract infection), bacterial    Transaminitis     Past Medical History:   Diagnosis Date    Anxiety     Asthma     COVID-19     Diagnosed with positive test 22.    CVA (cerebral vascular accident)     x3    Depression     Diabetes     Diabetic neuropathy     Former smoker     GERD (gastroesophageal reflux disease)     Hypertension     Hypothyroidism     Mitral stenosis     Mitral valve annular calcification     Peptic ulceration     PONV (postoperative nausea and vomiting)     Psoriasis     Vertigo      Past Surgical History:   Procedure Laterality Date    CARDIAC CATHETERIZATION      CARDIAC CATHETERIZATION N/A 2022    Procedure: Coronary angiography;  Surgeon: Eugene Lundy MD;  Location:  [facility];  Service: Cardiovascular;  Laterality: N/A;    CARDIAC CATHETERIZATION N/A 2022    Procedure: Left heart cath;  Surgeon: Eugene Lundy MD;  Location:  [facility];  Service: Cardiovascular;  Laterality: N/A;    CARDIAC CATHETERIZATION N/A 2022    Procedure: Left ventriculography;  Surgeon: Eugene Lundy MD;  Location:  [facility];  Service: Cardiovascular;  Laterality: N/A;     CARDIAC ELECTROPHYSIOLOGY PROCEDURE N/A 02/07/2022    Procedure: LOOP INSERTION linq;  Surgeon: Eugene Lundy MD;  Location:  INVASIVE LOCATION;  Service: Cardiovascular;  Laterality: N/A;    CHOLECYSTECTOMY  02/2000    HYSTERECTOMY  1980      General Information       Row Name 12/03/23 1229          Physical Therapy Time and Intention    Document Type evaluation  -     Mode of Treatment physical therapy  -       Row Name 12/03/23 1229          General Information    Patient Profile Reviewed yes  -     Prior Level of Function independent:  lives in RV  -       Row Name 12/03/23 1229          Living Environment    People in Home alone  -       Row Name 12/03/23 1229          Home Main Entrance    Number of Stairs, Main Entrance two  -SS       Row Name 12/03/23 1229          Stairs Within Home, Primary    Number of Stairs, Within Home, Primary two  -SS       Row Name 12/03/23 1229          Cognition    Orientation Status (Cognition) oriented x 4  -       Row Name 12/03/23 1229          Safety Issues, Functional Mobility    Impairments Affecting Function (Mobility) balance  -               User Key  (r) = Recorded By, (t) = Taken By, (c) = Cosigned By      Initials Name Provider Type     Sylvie Iglesias, PT Physical Therapist                   Mobility       Row Name 12/03/23 1230          Bed Mobility    Bed Mobility bed mobility (all) activities  -     All Activities, [place] (Bed Mobility) independent  -       Row Name 12/03/23 1230          Sit-Stand Transfer    Sit-Stand [place] (Transfers) independent  -       Row Name 12/03/23 1230          Gait/Stairs (Locomotion)    [place] Level (Gait) independent  -     Distance in Feet (Gait) 400'  -     Comment, (Gait/Stairs) mild postural instability- able to correct without assistance  -               User Key  (r) = Recorded By, (t) = Taken By, (c) = Cosigned By      Initials Name Provider Type      Sylvie Iglesias PT Physical Therapist                   Obj/Interventions       Row Name 12/03/23 1230          Range of Motion Comprehensive    General Range of Motion no range of motion deficits identified  -SS       Row Name 12/03/23 1230          Strength Comprehensive (MMT)    General Manual Muscle Testing (MMT) Assessment no strength deficits identified  -SS       Row Name 12/03/23 1230          Balance    Balance Assessment standing dynamic balance  -SS     Dynamic Standing Balance independent  -SS       Row Name 12/03/23 1230          Sensory Assessment (Somatosensory)    Sensory Assessment (Somatosensory) sensation intact  -SS               User Key  (r) = Recorded By, (t) = Taken By, (c) = Cosigned By      Initials Name Provider Type    SS Sylvie Iglesias, PT Physical Therapist                   Goals/Plan    No documentation.                  Clinical Impression       Row Name 12/03/23 1231          Pain    Pretreatment Pain Rating 0/10 - no pain  -SS     Posttreatment Pain Rating 0/10 - no pain  -SS       Row Name 12/03/23 1616 12/03/23 1231       Plan of Care Review    Plan of Care Reviewed With -- patient  -SS    Outcome Evaluation 74 y/o F who presented with c/o R sided numbness. Hospital dx L pontine CVA. CXR (-) acute. MRI brain (+) small 4 mm restricted diffusion within dorsal aspect of jose near L cerebellar peduncles likely representing small acute/subacute infarct, encephalomalacia within R occipital lobe corresponding to chronic R PCA territory infarct. US liver (+) 1.5cm hypoechoic liver lesion CTA head indicates no intracranial large vessel occlusion. PMHx significant for x3 CVA with memory loss, DM, anxiety, depression, and A. Fib. At baseline pt resides in , independent with ADLs and no AD for mobility. Pt reports baseline has slight decrease in balance and occasional dizziness. This date, pt A&Ox4 on RA and in supine upon arrival. Pt reports all complaints have resolved, no  numbness/tinging/visual disturbances. Pt completes bed mobility, functional transfers and functional mobility (I). Pt demo slight decrease in balance with dynamic standing, however able to self correct, reports this is at baseline. RN reports pt has been hypertensive however BP was WNL throughout and following mobility. No additional PT needs, safe to d/c home from PT standpoint.  - --      Row Name 12/03/23 1612 12/03/23 1231       Therapy Assessment/Plan (PT)    Criteria for Skilled Interventions Met (PT) no  -SS no  -SS    Therapy Frequency (PT) evaluation only  -SS evaluation only  -SS      Row Name 12/03/23 1612          Vital Signs    Post Systolic BP Rehab 130  -SS     Post Treatment Diastolic BP 60  -       Row Name 12/03/23 1612          Positioning and Restraints    Pre-Treatment Position in bed  -SS     Post Treatment Position bed  -SS               User Key  (r) = Recorded By, (t) = Taken By, (c) = Cosigned By      Initials Name Provider Type    Sylvie Pederson PT Physical Therapist                   Outcome Measures       Row Name 12/03/23 1613          Modified [place] Scale    Pre-Stroke Modified [place] Scale 1 - No significant disability despite symptoms.  Able to carry out all usual duties and activities.  -SS     Modified [place] Scale 1 - No significant disability despite symptoms.  Able to carry out all usual duties and activities.  -       Row Name 12/03/23 1613 12/03/23 1358       Functional Assessment    Outcome Measure Options Modified [place]  -SS AM-PAC 6 Clicks Daily Activity (OT)  -MS              User Key  (r) = Recorded By, (t) = Taken By, (c) = Cosigned By      Initials Name Provider Type    Sylvie Pederson, PT Physical Therapist    Julieth Christianson, OT Occupational Therapist                                 Physical Therapy Education       Title: PT OT SLP Therapies (Done)       Topic: Physical Therapy (Done)       Point: Mobility training (Done)       Learning  Progress Summary             Patient Acceptance, E, VU by  at 12/3/2023 1614                                         User Key       Initials Effective Dates Name Provider Type Discipline     06/16/21 -  Sylvie Iglesias, PT Physical Therapist PT                  PT Recommendation and Plan     Plan of Care Reviewed With: patient  Outcome Evaluation: 72 y/o F who presented with c/o R sided numbness. Hospital dx L pontine CVA. CXR (-) acute. MRI brain (+) small 4 mm restricted diffusion within dorsal aspect of jose near L cerebellar peduncles likely representing small acute/subacute infarct, encephalomalacia within R occipital lobe corresponding to chronic R PCA territory infarct. US liver (+) 1.5cm hypoechoic liver lesion CTA head indicates no intracranial large vessel occlusion. PMHx significant for x3 CVA with memory loss, DM, anxiety, depression, and A. Fib. At baseline pt resides in RV, independent with ADLs and no AD for mobility. Pt reports baseline has slight decrease in balance and occasional dizziness. This date, pt A&Ox4 on RA and in supine upon arrival. Pt reports all complaints have resolved, no numbness/tinging/visual disturbances. Pt completes bed mobility, functional transfers and functional mobility (I). Pt demo slight decrease in balance with dynamic standing, however able to self correct, reports this is at baseline. RN reports pt has been hypertensive however BP was WNL throughout and following mobility. No additional PT needs, safe to d/c home from PT standpoint.     Time Calculation:   PT Evaluation Complexity  History, PT Evaluation Complexity: 3 or more personal factors and/or comorbidities  Examination of Body Systems (PT Eval Complexity): total of 3 or more elements  Clinical Presentation (PT Evaluation Complexity): evolving  Clinical Decision Making (PT Evaluation Complexity): moderate complexity  Overall Complexity (PT Evaluation Complexity): moderate complexity     PT Charges        Row Name 12/03/23 1614             Time Calculation    Start Time 1224  -SS      Stop Time 1243  -SS      Time Calculation (min) 19 min  -SS      PT Received On 12/03/23  -         Time Calculation- PT    Total Timed Code Minutes- PT 0 minute(s)  -                User Key  (r) = Recorded By, (t) = Taken By, (c) = Cosigned By      Initials Name Provider Type     Sylvie Iglesias, PT Physical Therapist                  Therapy Charges for Today       Code Description Service Date Service Provider Modifiers Qty    15933065866 HC PT EVAL MOD COMPLEXITY 3 12/3/2023 Sylvie Iglesias, PT GP 1            PT G-Codes  Outcome Measure Options: Modified [place]  AM-PAC 6 Clicks Score (PT): 24  AM-PAC 6 Clicks Score (OT): 24  Modified [place] Scale: 1 - No significant disability despite symptoms.  Able to carry out all usual duties and activities.  PT Discharge Summary  Anticipated Discharge Disposition (PT): home    Sylvie Iglesias PT  12/3/2023

## 2023-12-03 NOTE — PLAN OF CARE
Goal Outcome Evaluation:  Plan of Care Reviewed With: patient            72 y/o F who presented with c/o R sided numbness. Hospital dx L pontine CVA. CXR (-) acute. MRI brain (+) small 4 mm restricted diffusion within dorsal aspect of jose near L cerebellar peduncles likely representing small acute/subacute infarct, encephalomalacia within R occipital lobe corresponding to chronic R PCA territory infarct. US liver (+) 1.5cm hypoechoic liver lesion CTA head indicates no intracranial large vessel occlusion. PMHx significant for x3 CVA with memory loss, DM, anxiety, depression, and A. Fib. At baseline pt resides in RV, independent with ADLs and no AD for mobility. Pt reports baseline has slight decrease in balance and occasional dizziness. This date, pt A&Ox4 on RA and in supine upon arrival. Pt reports all complaints have resolved, no numbness/tinging/visual disturbances. Pt completes bed mobility, functional transfers and functional mobility (I). Pt demo slight decrease in balance with dynamic standing, however able to self correct, reports this is at baseline. RN reports pt has been hypertensive however BP was WNL throughout and following mobility. No additional PT needs, safe to d/c home from PT standpoint.       Anticipated Discharge Disposition (PT): home

## 2023-12-04 ENCOUNTER — READMISSION MANAGEMENT (OUTPATIENT)
Dept: CALL CENTER | Facility: HOSPITAL | Age: 73
End: 2023-12-04
Payer: MEDICARE

## 2023-12-04 VITALS
TEMPERATURE: 97.5 F | WEIGHT: 163.8 LBS | OXYGEN SATURATION: 90 % | HEIGHT: 62 IN | DIASTOLIC BLOOD PRESSURE: 71 MMHG | SYSTOLIC BLOOD PRESSURE: 147 MMHG | BODY MASS INDEX: 30.14 KG/M2 | HEART RATE: 78 BPM | RESPIRATION RATE: 13 BRPM

## 2023-12-04 LAB
ALBUMIN SERPL-MCNC: 4 G/DL (ref 3.5–5.2)
ALBUMIN/GLOB SERPL: 1.4 G/DL
ALP SERPL-CCNC: 110 U/L (ref 39–117)
ALT SERPL W P-5'-P-CCNC: 76 U/L (ref 1–33)
ANION GAP SERPL CALCULATED.3IONS-SCNC: 13 MMOL/L (ref 5–15)
AST SERPL-CCNC: 69 U/L (ref 1–32)
BILIRUB SERPL-MCNC: <0.2 MG/DL (ref 0–1.2)
BUN SERPL-MCNC: 31 MG/DL (ref 8–23)
BUN/CREAT SERPL: 27.2 (ref 7–25)
CALCIUM SPEC-SCNC: 10 MG/DL (ref 8.6–10.5)
CHLORIDE SERPL-SCNC: 96 MMOL/L (ref 98–107)
CO2 SERPL-SCNC: 27 MMOL/L (ref 22–29)
CREAT SERPL-MCNC: 1.14 MG/DL (ref 0.57–1)
DEPRECATED RDW RBC AUTO: 53.8 FL (ref 37–54)
EGFRCR SERPLBLD CKD-EPI 2021: 50.3 ML/MIN/1.73
ERYTHROCYTE [DISTWIDTH] IN BLOOD BY AUTOMATED COUNT: 17.1 % (ref 12.3–15.4)
GLOBULIN UR ELPH-MCNC: 2.8 GM/DL
GLUCOSE BLDC GLUCOMTR-MCNC: 189 MG/DL (ref 70–105)
GLUCOSE BLDC GLUCOMTR-MCNC: 207 MG/DL (ref 70–105)
GLUCOSE SERPL-MCNC: 218 MG/DL (ref 65–99)
HCT VFR BLD AUTO: 40 % (ref 34–46.6)
HGB BLD-MCNC: 13.2 G/DL (ref 12–15.9)
MAGNESIUM SERPL-MCNC: 2 MG/DL (ref 1.6–2.4)
MCH RBC QN AUTO: 28.4 PG (ref 26.6–33)
MCHC RBC AUTO-ENTMCNC: 33 G/DL (ref 31.5–35.7)
MCV RBC AUTO: 86 FL (ref 79–97)
PLATELET # BLD AUTO: 280 10*3/MM3 (ref 140–450)
PMV BLD AUTO: 7.9 FL (ref 6–12)
POTASSIUM SERPL-SCNC: 2.9 MMOL/L (ref 3.5–5.2)
PROT SERPL-MCNC: 6.8 G/DL (ref 6–8.5)
RBC # BLD AUTO: 4.65 10*6/MM3 (ref 3.77–5.28)
SODIUM SERPL-SCNC: 136 MMOL/L (ref 136–145)
WBC NRBC COR # BLD AUTO: 8.3 10*3/MM3 (ref 3.4–10.8)

## 2023-12-04 PROCEDURE — G0108 DIAB MANAGE TRN  PER INDIV: HCPCS

## 2023-12-04 PROCEDURE — 83735 ASSAY OF MAGNESIUM: CPT | Performed by: INTERNAL MEDICINE

## 2023-12-04 PROCEDURE — 85027 COMPLETE CBC AUTOMATED: CPT | Performed by: STUDENT IN AN ORGANIZED HEALTH CARE EDUCATION/TRAINING PROGRAM

## 2023-12-04 PROCEDURE — 63710000001 INSULIN LISPRO (HUMAN) PER 5 UNITS: Performed by: HOSPITALIST

## 2023-12-04 PROCEDURE — 82948 REAGENT STRIP/BLOOD GLUCOSE: CPT

## 2023-12-04 PROCEDURE — 80053 COMPREHEN METABOLIC PANEL: CPT | Performed by: STUDENT IN AN ORGANIZED HEALTH CARE EDUCATION/TRAINING PROGRAM

## 2023-12-04 PROCEDURE — 36415 COLL VENOUS BLD VENIPUNCTURE: CPT | Performed by: STUDENT IN AN ORGANIZED HEALTH CARE EDUCATION/TRAINING PROGRAM

## 2023-12-04 RX ORDER — INSULIN GLARGINE 100 [IU]/ML
30 INJECTION, SOLUTION SUBCUTANEOUS NIGHTLY
Start: 2023-12-04

## 2023-12-04 RX ORDER — PEN NEEDLE, DIABETIC 30 GX3/16"
1 NEEDLE, DISPOSABLE MISCELLANEOUS
Qty: 600 EACH | Refills: 2 | Status: SHIPPED | OUTPATIENT
Start: 2023-12-04

## 2023-12-04 RX ORDER — DIGOXIN 250 MCG
0.25 TABLET ORAL
Qty: 90 TABLET | Refills: 1 | Status: SHIPPED | OUTPATIENT
Start: 2023-12-04

## 2023-12-04 RX ORDER — ASPIRIN 81 MG/1
81 TABLET, CHEWABLE ORAL DAILY
Start: 2023-12-05

## 2023-12-04 RX ORDER — INSULIN LISPRO 100 [IU]/ML
10 INJECTION, SOLUTION INTRAVENOUS; SUBCUTANEOUS
Qty: 15 ML | Refills: 3 | Status: SHIPPED | OUTPATIENT
Start: 2023-12-04

## 2023-12-04 RX ORDER — INSULIN LISPRO 100 [IU]/ML
10 INJECTION, SOLUTION INTRAVENOUS; SUBCUTANEOUS
Qty: 27 EACH | Refills: 2 | Status: SHIPPED | OUTPATIENT
Start: 2023-12-04 | End: 2023-12-05 | Stop reason: SDUPTHER

## 2023-12-04 RX ORDER — POTASSIUM CHLORIDE 20 MEQ/1
40 TABLET, EXTENDED RELEASE ORAL EVERY 4 HOURS
Status: COMPLETED | OUTPATIENT
Start: 2023-12-04 | End: 2023-12-04

## 2023-12-04 RX ADMIN — TOPIRAMATE 25 MG: 25 TABLET, FILM COATED ORAL at 08:56

## 2023-12-04 RX ADMIN — TICAGRELOR 90 MG: 90 TABLET ORAL at 08:56

## 2023-12-04 RX ADMIN — POTASSIUM CHLORIDE 40 MEQ: 1500 TABLET, EXTENDED RELEASE ORAL at 13:10

## 2023-12-04 RX ADMIN — CARVEDILOL 6.25 MG: 6.25 TABLET, FILM COATED ORAL at 08:56

## 2023-12-04 RX ADMIN — MECLIZINE HYDROCHLORIDE 25 MG: 25 TABLET ORAL at 11:30

## 2023-12-04 RX ADMIN — TRIAMTERENE AND HYDROCHLOROTHIAZIDE 1 TABLET: 50; 75 TABLET ORAL at 08:56

## 2023-12-04 RX ADMIN — PANTOPRAZOLE SODIUM 40 MG: 40 TABLET, DELAYED RELEASE ORAL at 08:56

## 2023-12-04 RX ADMIN — LEVOTHYROXINE SODIUM 50 MCG: 0.05 TABLET ORAL at 05:45

## 2023-12-04 RX ADMIN — POTASSIUM CHLORIDE 40 MEQ: 1500 TABLET, EXTENDED RELEASE ORAL at 08:56

## 2023-12-04 RX ADMIN — ASPIRIN 81 MG CHEWABLE TABLET 81 MG: 81 TABLET CHEWABLE at 08:56

## 2023-12-04 RX ADMIN — Medication 10 ML: at 08:56

## 2023-12-04 RX ADMIN — ALPRAZOLAM 0.12 MG: 0.25 TABLET ORAL at 08:56

## 2023-12-04 RX ADMIN — PAROXETINE HYDROCHLORIDE 20 MG: 20 TABLET, FILM COATED ORAL at 08:56

## 2023-12-04 RX ADMIN — INSULIN LISPRO 9 UNITS: 100 INJECTION, SOLUTION INTRAVENOUS; SUBCUTANEOUS at 09:37

## 2023-12-04 RX ADMIN — INSULIN LISPRO 11 UNITS: 100 INJECTION, SOLUTION INTRAVENOUS; SUBCUTANEOUS at 13:12

## 2023-12-04 NOTE — CONSULTS
"Diabetes Education  Assessment/Teaching    Patient Name:  Lula Burnett  YOB: 1950  MRN: [id number]  Admit Date:  12/1/2023      Assessment Date:  12/4/2023  Flowsheet Row Most Recent Value   General Information     Referral From: MD order  [Consult received due to bs >200. Adm bs 357. A1c this adm 12.2%.]   Height 157.5 cm (62\")   Weight 74.3 kg (163 lb 12.8 oz)   Weight Method Bed scale   Pregnancy Assessment    Diabetes History    What type of diabetes do you have? Type 2   Length of Diabetes Diagnosis --  [Dx 15 years ago.]   Do you test your blood sugar at home? yes   Frequency of checks daily in am   Meter type unsure of name, about 3 years old. She states has current test strips   Who performs the test? self   Typical readings 200s   Have you had low blood sugar? (<70mg/dl) no   Education Preferences    Nutrition Information    Assessment Topics    DM Goals             Flowsheet Row Most Recent Value   DM Education Needs    Meter Has own   Frequency of Testing 3 times a day  [Discussed importance of checking bs 3 times/day and  recording readings. Discussed reporting readings to PCP if bs running outside healthy range.]   Blood Glucose Target Range Discussed A1c result of 12.2%. Reviewed healthy bs range and healthy A1c target. Discussed importance of bs control.   Medication Insulin  [Pt has been taking  Lantus 13 units hs. She was supposed to be taking Synjardy but has not been taking. Pt states has never taken mealtime insulin.]   Problem Solving Hypoglycemia, Hyperglycemia, Signs, Symptoms, Treatment  [Discussed importance of always carrying low bs tx.]   Reducing Risks A1C testing  [Gave A1c info sheet.]   Healthy Eating --  [Pt states she usually eats 3 meals/day.]   Motivation Engaged   Teaching Method Explanation, Discussion, Demonstration, Handouts   Patient Response Verbalized understanding              Other Comments:  Met with pt at bedside. Pt states bs has been in the  200s " and not sure why they have been higher. She states she has been taking insulin as prescribed. Reviewed storage guidelines, injection sites and importance of rotating injection sites. Pt states she does have some hypertrophied areas. Discussed avoiding these areas until healed. Pt has been taking insulin pens in and out of the refrigerator. Discussed keeping pens in use at room temp and unopened pens in refrigerator.     Discussed basal/bolus insulin therapy. Pt states she would be agreeable to taking long-acting and mealtime insulin. Reviewed when to take mealtime insulin and importance of not taking this insulin if not eating a meal.     Pt states she does have PCP and sees about every 3 months. Pt states in the past A1c has been in the 7% range.     Educator sent secure chat to MD recommending pt be discharged on basal/bolus insulin therapy. MD had instructed pt to take Lantus 30 units hs. MD agreed with adding Lispro 10 units ac and has ordered for discharge.     Pt states has lantus pens, pen needles and monitoring supplies at home. Gave First Steps booklet, Planning Healthy Meals packet,  Insulin Pen Instruction sheet and low bs handout.        Electronically signed by:  Sveta Desouza RN  12/04/23 14:27 EST

## 2023-12-04 NOTE — PLAN OF CARE
Goal Outcome Evaluation:  Plan of Care Reviewed With: patient        Progress: improving  Outcome Evaluation: Patient reported SOA once during shift. Upon assessment noted no s/s resp. distress but patient appeared anxious. Discussion with patient re: anxiety and ways to decrease anxiety short and long term. After discussion and practice of deep breathing, relaxing etc. patient reported she felt much better and was able to decrease level of anxiety. Patient in agreement that she is having episodes of increased anxiety leon. with many recent major life changes and is most challenged when she is home alone at night.

## 2023-12-04 NOTE — PLAN OF CARE
Problem: Adult Inpatient Plan of Care  Goal: Plan of Care Review  Outcome: Ongoing, Progressing  Goal: Patient-Specific Goal (Individualized)  Outcome: Ongoing, Progressing  Goal: Absence of Hospital-Acquired Illness or Injury  Outcome: Ongoing, Progressing  Intervention: Identify and Manage Fall Risk  Recent Flowsheet Documentation  Taken 12/4/2023 1200 by Celsa Sen RN  Safety Promotion/Fall Prevention:   assistive device/personal items within reach   clutter free environment maintained   fall prevention program maintained   nonskid shoes/slippers when out of bed   room organization consistent   safety round/check completed  Taken 12/4/2023 1000 by Celsa Sen RN  Safety Promotion/Fall Prevention: safety round/check completed  Taken 12/4/2023 0830 by Celsa Sen RN  Safety Promotion/Fall Prevention:   assistive device/personal items within reach   clutter free environment maintained   fall prevention program maintained   nonskid shoes/slippers when out of bed   room organization consistent   safety round/check completed  Taken 12/4/2023 0800 by Celsa Sen RN  Safety Promotion/Fall Prevention: safety round/check completed  Intervention: Prevent Skin Injury  Recent Flowsheet Documentation  Taken 12/4/2023 1200 by Celsa Sen RN  Body Position: position changed independently  Taken 12/4/2023 0830 by Celsa Sen RN  Body Position: position changed independently  Intervention: Prevent and Manage VTE (Venous Thromboembolism) Risk  Recent Flowsheet Documentation  Taken 12/4/2023 1200 by Celsa Sen RN  Activity Management: activity encouraged  Taken 12/4/2023 0830 by Celsa Sen RN  Activity Management: activity encouraged  VTE Prevention/Management: sequential compression devices on  Intervention: Prevent Infection  Recent Flowsheet Documentation  Taken 12/4/2023 1200 by Celsa Sen RN  Infection Prevention: hand hygiene promoted  Taken 12/4/2023 0830 by  Celsa Sen RN  Infection Prevention: hand hygiene promoted  Goal: Optimal Comfort and Wellbeing  Outcome: Ongoing, Progressing  Goal: Readiness for Transition of Care  Outcome: Ongoing, Progressing   Goal Outcome Evaluation:      Patient did not have any new tests or procedures. The patient remains on room air and did not have any complaints of pain. The patient is discharging home. Will continue to monitor.

## 2023-12-04 NOTE — DISCHARGE SUMMARY
[facility] Medicine Services  Discharge Summary    Date of Service: 2023  Patient Name: Lula Burnett  : 1950  MRN: [id number]    Date of Admission: 2023  Discharge Diagnosis:   Acute left pontine stroke  Acute UTI ruled out  Elevated LFTs  DM type II, uncontrolled with hyperglycemia  Hypertension  Dyslipidemia  Hypothyroidism  Paroxysmal atrial fibrillation    Date of Discharge: 2023  Primary Care Physician: Val Irvin MD      Presenting Problem:   Acute urinary tract infection [N39.0]  Left pontine stroke [I63.9]  Imbalance due to old stroke [I69.398, R26.89]  Altered mental status, unspecified altered mental status type [R41.82]    Active and Resolved Hospital Problems:  Active Hospital Problems    Diagnosis POA    **Left pontine stroke [I63.9] Yes    UTI (urinary tract infection), bacterial [N39.0, A49.9] Yes    Transaminitis [R74.01] Yes    Memory impairment [R41.3] Yes    Paroxysmal atrial fibrillation [I48.0] Yes    Class 1 obesity in adult [E66.9] Yes    Diabetes [E11.9] Yes    Diabetic neuropathy [E11.40] Yes    GERD (gastroesophageal reflux disease) [K21.9] Yes    Hypertension [I10] Yes    Hypothyroidism [E03.9] Yes    Mitral stenosis [I05.0] Yes    Mixed hyperlipidemia [E78.2] Yes    Anxiety [F41.9] Yes    Depression [F32.A] Yes      Resolved Hospital Problems   No resolved problems to display.         Hospital Course     HPI:  Patient is a 73-year-old female who presented to the hospital with complaints of right-sided weakness and numbness.  Please see H&P for details.    Hospital Course:  The patient was admitted to the hospital with further workup revealing acute left pontine stroke on MRI.  Her symptoms resolved over the course of her hospital stay.  She was not a candidate for any intervention with TNK or thrombectomy.  The patient has known underlying hypertension and diabetes which were both poorly controlled prior to admission.  Blood  pressure was controlled during her hospital stay and insulin therapy will be adjusted for discharge for improved glycemic control.  Patient's A1c of 12 indicates very poor glycemic control prior to admission.  CT angiogram of the head neck did not show any acute vascular pathology.  Echocardiogram was also normal.  However the patient states that she has a previous diagnosis of atrial fibrillation but does not recall being on any anticoagulation prior to this admission.  She has had several strokes prior to this admission as well.  I discussed with her in depth regarding initiation of anticoagulation, however she states that she would like to follow-up with her primary cardiologist Dr. Benjamin Hancock prior to making this decision.  I advised her to follow-up with Dr. Hancock as soon as possible.  If she is to be started on anticoagulation, she can stop her aspirin and continue antiplatelet therapy with Brilinta, which was started on this admission.  She was previously on Plavix however her P2 Y12 platelet inhibition study indicates that she is a nonresponder to Plavix therapy.  However this may also be due to the patient being noncompliant with her therapy prior to admission.  PT/OT/SLP evaluate the patient and they do not feel that the patient requires any further outpatient therapy needs.  She is medically stable for discharge.        DISCHARGE Follow Up Recommendations for labs and diagnostics: Follow-up with stroke neurology clinic in 1 month and follow-up with your primary cardiologist within the next 2 weeks.      Reasons For Change In Medications and Indications for New Medications:      Day of Discharge     Vital Signs:  Temp:  [97.5 °F (36.4 °C)-98.2 °F (36.8 °C)] 97.5 °F (36.4 °C)  Heart Rate:  [60-78] 78  Resp:  [13-15] 13  BP: (140-155)/(60-71) 147/71    Physical Exam:  Physical Exam   General Appearance:  Alert, cooperative, no distress, appears stated age  Head:  Normocephalic, without obvious  abnormality, atraumatic  Eyes:  PERRL, conjunctiva/corneas clear, EOM's intact, fundi benign, both eyes  Ears:  Normal TM's and external ear canals, both ears  Nose: Nares normal, septum midline, mucosa normal, no drainage or sinus tenderness  Throat: Lips, mucosa, and tongue normal; teeth and gums normal  Neck: Supple, symmetrical, trachea midline, no adenopathy, thyroid: not enlarged, symmetric, no tenderness/mass/nodules, no carotid bruit or JVD  Lungs:   Clear to auscultation bilaterally, respirations unlabored  Heart:  Regular rate and rhythm, S1, S2 normal, no murmur, rub or gallop  Abdomen:  Soft, non-tender, bowel sounds active all four quadrants,  no masses, no organomegaly  Extremities: Extremities normal, atraumatic, no cyanosis or edema  Pulses: 2+ and symmetric  Skin: Skin color, texture, turgor normal, no rashes or lesions  Neurologic: CN II through XII intact, muscle strength 5/5 in all 4 extremities, no focal deficits        Pertinent  and/or Most Recent Results     LAB RESULTS:      Lab 12/04/23 0229 12/03/23 0040 12/02/23  0037 12/01/23  2246 12/01/23  1923 12/01/23  1818   WBC 8.30 8.00 8.90  --   --  10.60   HEMOGLOBIN 13.2 13.6 13.8  --   --  14.9   HEMATOCRIT 40.0 42.1 42.0  --   --  44.4   PLATELETS 280 315 307  --   --  353   NEUTROS ABS  --   --   --   --   --  4.90   LYMPHS ABS  --   --   --   --   --  4.90*   MONOS ABS  --   --   --   --   --  0.60   EOS ABS  --   --   --   --   --  0.10   MCV 86.0 88.0 85.5  --   --  86.4   LACTATE  --   --   --  2.2* 3.0*  --          Lab 12/04/23 0229 12/03/23 0040 12/02/23  0037 12/01/23  1818   SODIUM 136 137 137 131*   POTASSIUM 2.9* 3.7 3.5 4.1   CHLORIDE 96* 95* 97* 92*   CO2 27.0 28.0 25.0 18.0*   ANION GAP 13.0 14.0 15.0 21.0*   BUN 31* 28* 26* 29*   CREATININE 1.14* 1.28* 1.37* 1.32*   EGFR 50.3* 43.8* 40.3* 42.2*   GLUCOSE 218* 226* 206* 357*   CALCIUM 10.0 9.9 9.6 10.3   MAGNESIUM 2.0  --   --   --    HEMOGLOBIN A1C  --   --  12.20*  --     TSH  --   --  4.300*  --          Lab 12/04/23  0229 12/03/23  0040 12/02/23  0037 12/01/23  1818   TOTAL PROTEIN 6.8 7.2 7.2 7.7   ALBUMIN 4.0 4.0 4.0 4.5   GLOBULIN 2.8 3.2 3.2 3.2   ALT (SGPT) 76* 96* 89* 110*   AST (SGOT) 69* 100* 101* 145*   BILIRUBIN <0.2 0.2 0.2 0.3   ALK PHOS 110 119* 113 129*         Lab 12/01/23  1818   PROBNP 251.4   HSTROP T 29*         Lab 12/02/23  0037   CHOLESTEROL 271*   LDL CHOL 126*   HDL CHOL 39*   TRIGLYCERIDES 581*         Lab 12/02/23  1731 12/02/23 0037   IRON  --  43   IRON SATURATION (TSAT)  --  12*   TIBC  --  352   TRANSFERRIN  --  236   FOLATE >20.00  --    VITAMIN B 12  --  1,010*         Brief Urine Lab Results  (Last result in the past 365 days)        Color   Clarity   Blood   Leuk Est   Nitrite   Protein   CREAT   Urine HCG        12/01/23 2111 Yellow   Cloudy   Small (1+)   Moderate (2+)   Negative   100 mg/dL (2+)                 Microbiology Results (last 10 days)       Procedure Component Value - Date/Time    Urine Culture - Urine, Urine, Clean Catch [[id number]] Collected: 12/01/23 2111    Lab Status: Final result Specimen: Urine, Clean Catch Updated: 12/03/23 1435     Urine Culture 50,000 CFU/mL Mixed Caryn Isolated    Narrative:      Specimen contains mixed organisms of questionable pathogenicity suggestive of contamination. If symptoms persist, suggest recollection.  Colonization of the urinary tract without infection is common. Treatment is discouraged unless the patient is symptomatic, pregnant, or undergoing an invasive urologic procedure.    Blood Culture - Blood, Arm, Right [[id number]]  (Normal) Collected: 12/01/23 1917    Lab Status: Preliminary result Specimen: Blood from Arm, Right Updated: 12/03/23 1931     Blood Culture No growth at 2 days    Blood Culture - Blood, Arm, Left [[id number]]  (Normal) Collected: 12/01/23 1917    Lab Status: Preliminary result Specimen: Blood from Arm, Left Updated: 12/03/23 1931     Blood Culture No growth at 2 days     COVID-19 and FLU A/B PCR, 1 HR TAT - Swab, Nasopharynx [[id number]]  (Normal) Collected: 12/01/23 1906    Lab Status: Final result Specimen: Swab from Nasopharynx Updated: 12/01/23 1945     COVID19 Not Detected     Influenza A PCR Not Detected     Influenza B PCR Not Detected    Narrative:      Fact sheet for providers: https://www.fda.gov/media/376119/download    Fact sheet for patients: https://www.fda.gov/media/200098/download    Test performed by PCR.            CT Angiogram Head    Result Date: 12/2/2023  Impression: 1.No intracranial large vessel occlusion is identified. 2.Irregularity of the bilateral anterior cerebral arteries with apparent underlying stenoses (see series 5, images 600-700). Underlying vasculitis may be considered. 3.Right posterior cerebral artery appears diminutive. Probable remote right PCA territory infarct. 4.No significant stenosis of the bilateral internal carotid arteries. 5.Please see above for additional details. Electronically Signed: Ac Fiore MD  12/2/2023 7:34 PM EST  Workstation ID: [id number]    CT Angiogram Carotids    Result Date: 12/2/2023  Impression: 1.No intracranial large vessel occlusion is identified. 2.Irregularity of the bilateral anterior cerebral arteries with apparent underlying stenoses (see series 5, images 600-700). Underlying vasculitis may be considered. 3.Right posterior cerebral artery appears diminutive. Probable remote right PCA territory infarct. 4.No significant stenosis of the bilateral internal carotid arteries. 5.Please see above for additional details. Electronically Signed: Ac Fiore MD  12/2/2023 7:34 PM EST  Workstation ID: [id number]    US Liver    Result Date: 12/2/2023  Impression: 1.Echogenic liver, compatible with hepatic steatosis versus fibrosis. 2.1.5 cm hypoechoic liver lesion, incompletely characterized. Further evaluation with three-phase liver CT may be beneficial. 3.The gallbladder was not imaged. Electronically Signed:  Ac Fiore MD  12/2/2023 8:58 AM EST  Workstation ID: [id number]    MRI Brain With & Without Contrast    Result Date: 12/1/2023  Impression: Impression: 1. Small 4 mm focus of restricted diffusion within the dorsal aspect of the jose near the left cerebellar peduncles likely representing a small acute or subacute infarct given patient's symptoms. 2. Encephalomalacia within the right occipital lobe corresponding to an old chronic right PCA territory infarct. 3. Mild periventricular white matter T2/FLAIR hyperintense signal changes likely representing sequelae of chronic small vessel ischemic disease. Findings called to Dr. Person at 9:23 p.m. on 12/1/2023. Electronically Signed: Davis Valadezelor  12/1/2023 9:23 PM EST  Workstation ID: [id number] [facility]    XR Chest 1 View    Result Date: 12/1/2023  Impression: Impression: No acute process identified Electronically Signed: Dashawn Todd MD  12/1/2023 6:04 PM CST  Workstation ID: [id number]             Results for orders placed during the hospital encounter of 12/01/23    Adult Transthoracic Echo Complete W/ Cont if Necessary Per Protocol (With Agitated Saline)    Interpretation Summary    Left ventricular ejection fraction appears to be 56 - 60%.    Saline test results are negative.    There is calcification of the aortic valve.      Labs Pending at Discharge:  Pending Labs       Order Current Status    Blood Culture - Blood, Arm, Left Preliminary result    Blood Culture - Blood, Arm, Right Preliminary result            Procedures Performed           Consults:   Consults       Date and Time Order Name Status Description    12/2/2023  3:01 PM Inpatient Neurology Consult Stroke Completed     12/1/2023 11:31 PM Inpatient Neurology Consult Stroke                Discharge Details        Discharge Medications        New Medications        Instructions Start Date   aspirin 81 MG chewable tablet   81 mg, Oral, Daily   Start Date: December 5, 2023     Insulin Lispro (1 Unit  Dial) 100 UNIT/ML solution pen-injector  Commonly known as: HumaLOG KwikPen   10 Units, Subcutaneous, 3 Times Daily Before Meals      HumaLOG KwikPen 100 UNIT/ML solution pen-injector  Generic drug: Insulin Lispro (1 Unit Dial)   10 Units, Subcutaneous, Daily With Breakfast, Lunch & Dinner      ticagrelor 90 MG tablet tablet  Commonly known as: BRILINTA   90 mg, Oral, 2 Times Daily             Changes to Medications        Instructions Start Date   B-D UF III MINI PEN NEEDLES 31G X 5 MM misc  Generic drug: Insulin Pen Needle  What changed: Another medication with the same name was added. Make sure you understand how and when to take each.   1 Piece, Does not apply, Daily      Pen Needles 32G X 4 MM misc  What changed: You were already taking a medication with the same name, and this prescription was added. Make sure you understand how and when to take each.   1 each, Does not apply, 4 Times Daily Before Meals & Nightly      digoxin 250 MCG tablet  Commonly known as: LANOXIN  What changed:   medication strength  how much to take  how to take this  when to take this  additional instructions   0.25 mg, Oral, Daily Digoxin, 1 tablet daily      Lantus SoloStar 100 UNIT/ML injection pen  Generic drug: Insulin Glargine  What changed:   how much to take  how to take this  when to take this  additional instructions   30 Units, Subcutaneous, Nightly             Continue These Medications        Instructions Start Date   ALPRAZolam 0.25 MG tablet  Commonly known as: XANAX   0.125 mg, Oral, 2 Times Daily      carvedilol 6.25 MG tablet  Commonly known as: COREG   6.25 mg, Oral, 2 Times Daily With Meals      CENTRUM FRESH/FRUITY 50+ PO   Oral      cetirizine 10 MG tablet  Commonly known as: zyrTEC   TAKE 1 TABLET DAILY      cyclobenzaprine 10 MG tablet  Commonly known as: FLEXERIL   10 mg, Oral, 3 Times Daily PRN      estrogens (conjugated) 1.25 MG tablet  Commonly known as: PREMARIN   1.25 mg, Oral, Daily       Fluticasone-Salmeterol 250-50 MCG/ACT DISKUS  Commonly known as: ADVAIR/WIXELA   1 puff, Inhalation, 2 Times Daily - RT      Humira Pen 40 MG/0.8ML Pen-injector Kit  Generic drug: Adalimumab  Notes to patient: Take as directed.   40 mg, Subcutaneous, Every 14 Days      levothyroxine 50 MCG tablet  Commonly known as: SYNTHROID, LEVOTHROID   50 mcg, Oral, Daily      meclizine 25 MG tablet  Commonly known as: ANTIVERT   TAKE 1 TABLET THREE TIMES A DAY AS NEEDED FOR DIZZINESS      montelukast 10 MG tablet  Commonly known as: SINGULAIR   10 mg, Oral, Nightly      nitroglycerin 0.4 MG SL tablet  Commonly known as: NITROSTAT   1 under the tongue as needed for angina, may repeat q5mins for up three doses      ondansetron ODT 8 MG disintegrating tablet  Commonly known as: ZOFRAN-ODT   PLACE 1 TABLET ON THE TONGUE EVERY 8 HOURS AS NEEDED FOR NAUSEA OR VOMITING      pantoprazole 40 MG EC tablet  Commonly known as: PROTONIX   40 mg, Oral, Daily      PARoxetine 20 MG tablet  Commonly known as: PAXIL   20 mg, Oral, Daily      potassium chloride 20 MEQ CR tablet  Commonly known as: K-DUR,KLOR-CON   TAKE 1 TABLET DAILY      ProAir  (90 Base) MCG/ACT inhaler  Generic drug: albuterol sulfate HFA   2 puffs, Inhalation, Every 4 Hours PRN      rosuvastatin 20 MG tablet  Commonly known as: CRESTOR   TAKE 1 TABLET DAILY      Synjardy XR  MG tablet sustained-release 24 hour  Generic drug: Empagliflozin-metFORMIN HCl ER   1 tablet, Oral, Daily      topiramate 25 MG tablet  Commonly known as: TOPAMAX   25 mg, Oral, 2 Times Daily      triamterene-hydrochlorothiazide 75-50 MG per tablet  Commonly known as: MAXZIDE   TAKE 1 TABLET DAILY      Turmeric 500 MG capsule   Oral             Stop These Medications      clopidogrel 75 MG tablet  Commonly known as: PLAVIX     ibuprofen 600 MG tablet  Commonly known as: ADVIL,MOTRIN              Allergies   Allergen Reactions    Codeine GI Intolerance         Discharge Disposition:      Home or Self Care    Diet:  Hospital:  Diet Order   Procedures    Diet: Cardiac Diets, Diabetic Diets; Healthy Heart (2-3 Na+); Consistent Carbohydrate; Texture: Regular Texture (IDDSI 7); Fluid Consistency: Thin (IDDSI 0)         Discharge Activity:         CODE STATUS:  Code Status and Medical Interventions:   Ordered at: 12/01/23 3421     Medical Intervention Limits:    NO intubation (DNI)     Code Status (Patient has no pulse and is not breathing):    No CPR (Do Not Attempt to Resuscitate)     Medical Interventions (Patient has pulse or is breathing):    Limited Support         Future Appointments   Date Time Provider Department Center   1/5/2024  2:45 PM Val Irvin MD [facility]       Additional Instructions for the Follow-ups that You Need to Schedule       Discharge Follow-up with Specialty: Follow-up with your primary cardiologist Dr. Benjamin Hancock in 2 weeks; 2 Weeks   As directed      Specialty: Follow-up with your primary cardiologist Dr. Benjamin Hancock in 2 weeks   Follow Up: 2 Weeks        Discharge Follow-up with Specified Provider: Follow-up with stroke neurology clinic in 1 month; 1 Month   As directed      To: Follow-up with stroke neurology clinic in 1 month   Follow Up: 1 Month                Time spent on Discharge including face to face service:  >30 minutes    Signature: Electronically signed by Hao Christiansen MD, 12/04/23, 14:26 EST.  [facility]ist Team

## 2023-12-05 ENCOUNTER — TELEPHONE (OUTPATIENT)
Dept: FAMILY MEDICINE CLINIC | Facility: CLINIC | Age: 73
End: 2023-12-05
Payer: MEDICARE

## 2023-12-05 ENCOUNTER — TELEPHONE (OUTPATIENT)
Dept: DIABETES SERVICES | Facility: HOSPITAL | Age: 73
End: 2023-12-05
Payer: MEDICARE

## 2023-12-05 ENCOUNTER — TRANSITIONAL CARE MANAGEMENT TELEPHONE ENCOUNTER (OUTPATIENT)
Dept: CALL CENTER | Facility: HOSPITAL | Age: 73
End: 2023-12-05
Payer: MEDICARE

## 2023-12-05 DIAGNOSIS — Z79.4 TYPE 2 DIABETES MELLITUS WITH OTHER SPECIFIED COMPLICATION, WITH LONG-TERM CURRENT USE OF INSULIN: Primary | Chronic | ICD-10-CM

## 2023-12-05 DIAGNOSIS — E11.69 TYPE 2 DIABETES MELLITUS WITH OTHER SPECIFIED COMPLICATION, WITH LONG-TERM CURRENT USE OF INSULIN: Primary | Chronic | ICD-10-CM

## 2023-12-05 RX ORDER — INSULIN LISPRO 100 [IU]/ML
10 INJECTION, SOLUTION INTRAVENOUS; SUBCUTANEOUS
Qty: 15 ML | Refills: 0 | Status: SHIPPED | OUTPATIENT
Start: 2023-12-05

## 2023-12-05 NOTE — CASE MANAGEMENT/SOCIAL WORK
Case Management Discharge Note      Final Note: Routine Home               Transportation Services  Private: Car    Final Discharge Disposition Code: 01 - home or self-care

## 2023-12-05 NOTE — TELEPHONE ENCOUNTER
"  Caller: Lula Burnett \"CHANCE\"    Relationship: Self    Best call back number: [phone] (Mobile)     What medication are you requesting:     Insulin Lispro, 1 Unit Dial, (HumaLOG KwikPen) 100 UNIT/ML solution pen-injector      NATHAN SALINASEMMA       Have you had these symptoms before:    [x] Yes  [] No    Have you been treated for these symptoms before:   [x] Yes  [] No    If a prescription is needed, what is your preferred pharmacy and phone number:      [id number] - [place], [place] - [address] - [phone] PH - [phone]       Additional notes:  WRITTEN FOR HER WHILE IN HOSPITAL AND CANNOT REACH MAIL ORDER TO ORDER IT.     SHE IS OUT AND NEEDS ASAP     "

## 2023-12-05 NOTE — OUTREACH NOTE
Call Center TCM Note      Flowsheet Row Responses   [facility] facility patient discharged from? [facility]   Does the patient have one of the following disease processes/diagnoses(primary or secondary)? Stroke   TCM attempt successful? No   Unsuccessful attempts Attempt 1            Anette Wheat MA    12/5/2023, 10:51 EST

## 2023-12-05 NOTE — TELEPHONE ENCOUNTER
Discussed with pt the rx for Lispro 10 units ac was sent to [facility]. Discussed pt should contact [facility] to see if she could have them send the insulin ASAP. Pt states she was going to contact them and then was also going to contact her PCP to see if MD would send one-time rx to local pharmacy to get her through until she receives the insulin from [facility]. Pt states she did take Lantus 30 units at hs last night. This am bs 191. Pt with no additional questions.

## 2023-12-05 NOTE — OUTREACH NOTE
Prep Survey      Flowsheet Row Responses   [facility] facility patient discharged from? [facility]   Is LACE score < 7 ? No   Eligibility [facility]   [facility]   Date of Admission 12/01/23   Date of Discharge 12/04/23   Discharge Disposition Home or Self Care   Discharge diagnosis Left pontine stroke   Does the patient have one of the following disease processes/diagnoses(primary or secondary)? Stroke   Does the patient have Home health ordered? No   Is there a DME ordered? No   Prep survey completed? Yes            CELENA A - Registered Nurse          
DISPLAY PLAN FREE TEXT

## 2023-12-05 NOTE — OUTREACH NOTE
Call Center TCM Note      Flowsheet Row Responses   [facility] patient discharged from? [facility]   Does the patient have one of the following disease processes/diagnoses(primary or secondary)? Stroke   TCM attempt successful? Yes   Call start time 1457   Call end time 1459   Discharge diagnosis Left pontine stroke   Does the patient have all medications ordered at discharge? Yes   Is the patient taking all medications as directed (includes completed medication regime)? Yes   Does the patient have an appointment with their PCP within 7-14 days of discharge? No   Nursing Interventions Patient declined scheduling/rescheduling appointment at this time   Has home health visited the patient within 72 hours of discharge? N/A   Psychosocial issues? No   Did the patient receive a copy of their discharge instructions? Yes   Nursing interventions Reviewed instructions with patient   What is the patient's perception of their health status since discharge? Improving   Nursing interventions Nurse provided patient education   Is the patient/caregiver able to teach back the risk factors for a stroke? History of TIAs   Is the patient/caregiver able to teach back signs and symptoms related to disease process for when to call PCP? Yes   Is the patient/caregiver able to teach back signs and symptoms related to disease process for when to call 911? Yes   Is the patient/caregiver able to teach back the hierarchy of who to call/visit for symptoms/problems? PCP, Specialist, Home health nurse, Urgent Care, ED, 911 Yes   Is the patient able to teach back FAST for Stroke? B alance: Watch for sudden loss of balance, A rm: Check if one arm is weak, T royce: Call 9-1-1 right away, S peech: Listen for slurred speech, F ace: Look for an uneven smile, E yes: Check for vision loss   TCM call completed? Yes   Wrap up additional comments Doing well, no questions, states she is working with PCP office to get her medicine straightened out, declined to  make a hospital f/u appt and states she will talk to PCP office.   Call end time [id number]   Would this patient benefit from a Referral to [facility] Social Work? No   Is the patient interested in additional calls from an ambulatory ? No            Mago Isaac RN    12/5/2023, 14:59 CST

## 2023-12-05 NOTE — TELEPHONE ENCOUNTER
I have sent prescription for insulin lispro without refills.  Patient refused transition of care appointment.   I am not recanting dismissal and will see patient for emergencies only for 30 days from receipt of dismissal letter

## 2023-12-06 LAB
BACTERIA SPEC AEROBE CULT: NORMAL
BACTERIA SPEC AEROBE CULT: NORMAL

## 2023-12-13 ENCOUNTER — READMISSION MANAGEMENT (OUTPATIENT)
Dept: CALL CENTER | Facility: HOSPITAL | Age: 73
End: 2023-12-13
Payer: MEDICARE

## 2023-12-13 NOTE — OUTREACH NOTE
Stroke Week 2 Survey      Flowsheet Row Responses   [facility], [place], operated by [facility] patient discharged from? [facility]   Does the patient have one of the following disease processes/diagnoses(primary or secondary)? Stroke   Week 2 attempt successful? Yes   Call start time 1717   Call end time 1725   Discharge diagnosis Left pontine stroke   Person spoke with today (if not patient) and relationship Patient   Meds reviewed with patient/caregiver? Yes   Does the patient have all medications ordered at discharge? No   What is keeping the patient from filling the prescriptions? --  [Patient reports that she is waiting for the lispro to be delivered from [facility].]   Nursing Interventions No intervention needed  [Patient reports that should be delivered soon, denies needing any help with getting med.]   Is the patient taking all medications as directed (includes completed medication regime)? No   What is preventing the patient from taking all medications as directed? Other  [see above]   Medication comments Patient reports that her blood sugar is in good range, reports this am 108.   Comments regarding appointments Follow-up with stroke neurology clinic in 1 month   Does the patient have a primary care provider?  Yes   Does the patient have an appointment with their PCP within 7 days of discharge? Greater than 7 days   Comments regarding PCP Office Visit with Val Irvin Friday Jan 5, 2024 2:45 PM   Nursing Interventions Verified appointment date/time/provider   Has the patient kept scheduled appointments due by today? N/A   Comments Follow-up with your primary cardiologist Dr. Benjamin Hancock in 2 weeks   Has home health visited the patient within 72 hours of discharge? N/A   Psychosocial issues? No   Does the patient require any assistance with activities of daily living such as eating, bathing, dressing, walking, etc.? No   Does the patient have any residual symptoms from stroke/TIA? Yes   Residual symptoms comments Patient  reports that she still has balance issues.   Did the patient receive a copy of their discharge instructions? Yes   Nursing interventions Reviewed instructions with patient   What is the patient's perception of their health status since discharge? Improving   Nursing interventions Nurse provided patient education   Is the patient able to teach back FAST for Stroke? B alance: Watch for sudden loss of balance   Is the patient/caregiver able to teach back the risk factors for a stroke? Diabetes, High blood pressure-goal below 120/80   Is the patient/caregiver able to teach back signs and symptoms related to disease process for when to call PCP? Yes   Is the patient/caregiver able to teach back signs and symptoms related to disease process for when to call 911? Yes   If the patient is a current smoker, are they able to teach back resources for cessation? Not a smoker   Is the patient/caregiver able to teach back the hierarchy of who to call/visit for symptoms/problems? PCP, Specialist, Home health nurse, Urgent Care, ED, 911 Yes   Week 2 call completed? Yes   Is the patient interested in additional calls from an ambulatory ? No   Would this patient benefit from a Referral to [facility] Social Work? No   Call end time 2411            DENTON MOJICA - Registered Nurse

## 2023-12-16 ENCOUNTER — READMISSION MANAGEMENT (OUTPATIENT)
Dept: CALL CENTER | Facility: HOSPITAL | Age: 73
End: 2023-12-16
Payer: MEDICARE

## 2023-12-16 ENCOUNTER — APPOINTMENT (OUTPATIENT)
Dept: GENERAL RADIOLOGY | Facility: HOSPITAL | Age: 73
End: 2023-12-16
Payer: MEDICARE

## 2023-12-16 ENCOUNTER — APPOINTMENT (OUTPATIENT)
Dept: CT IMAGING | Facility: HOSPITAL | Age: 73
End: 2023-12-16
Payer: MEDICARE

## 2023-12-16 ENCOUNTER — HOSPITAL ENCOUNTER (OUTPATIENT)
Facility: HOSPITAL | Age: 73
Setting detail: OBSERVATION
Discharge: HOME | End: 2023-12-18
Attending: EMERGENCY MEDICINE | Admitting: STUDENT IN AN ORGANIZED HEALTH CARE EDUCATION/TRAINING PROGRAM
Payer: MEDICARE

## 2023-12-16 DIAGNOSIS — R42 DIZZINESS: ICD-10-CM

## 2023-12-16 DIAGNOSIS — R20.0 FACIAL NUMBNESS: ICD-10-CM

## 2023-12-16 DIAGNOSIS — I63.9 ACUTE STROKE DUE TO ISCHEMIA: Primary | ICD-10-CM

## 2023-12-16 LAB
ALBUMIN SERPL-MCNC: 4.6 G/DL (ref 3.5–5.2)
ALBUMIN/GLOB SERPL: 1.4 G/DL
ALP SERPL-CCNC: 99 U/L (ref 39–117)
ALT SERPL W P-5'-P-CCNC: 89 U/L (ref 1–33)
ANION GAP SERPL CALCULATED.3IONS-SCNC: 21 MMOL/L (ref 5–15)
APTT PPP: 26.1 SECONDS (ref 61–76.5)
AST SERPL-CCNC: 58 U/L (ref 1–32)
BASOPHILS # BLD AUTO: 0 10*3/MM3 (ref 0–0.2)
BASOPHILS NFR BLD AUTO: 0.3 % (ref 0–1.5)
BILIRUB SERPL-MCNC: 0.5 MG/DL (ref 0–1.2)
BUN SERPL-MCNC: 29 MG/DL (ref 8–23)
BUN/CREAT SERPL: 22.7 (ref 7–25)
CALCIUM SPEC-SCNC: 10.4 MG/DL (ref 8.6–10.5)
CHLORIDE SERPL-SCNC: 97 MMOL/L (ref 98–107)
CO2 SERPL-SCNC: 21 MMOL/L (ref 22–29)
CREAT SERPL-MCNC: 1.28 MG/DL (ref 0.57–1)
DEPRECATED RDW RBC AUTO: 53.4 FL (ref 37–54)
DIGOXIN SERPL-MCNC: 1.5 NG/ML (ref 0.6–1.2)
EGFRCR SERPLBLD CKD-EPI 2021: 43.8 ML/MIN/1.73
EOSINOPHIL # BLD AUTO: 0 10*3/MM3 (ref 0–0.4)
EOSINOPHIL NFR BLD AUTO: 0.1 % (ref 0.3–6.2)
ERYTHROCYTE [DISTWIDTH] IN BLOOD BY AUTOMATED COUNT: 16.8 % (ref 12.3–15.4)
GLOBULIN UR ELPH-MCNC: 3.4 GM/DL
GLUCOSE BLDC GLUCOMTR-MCNC: 236 MG/DL (ref 70–105)
GLUCOSE SERPL-MCNC: 224 MG/DL (ref 65–99)
HCT VFR BLD AUTO: 45.5 % (ref 34–46.6)
HGB BLD-MCNC: 15 G/DL (ref 12–15.9)
HOLD SPECIMEN: NORMAL
HOLD SPECIMEN: NORMAL
INR PPP: 0.94 (ref 0.93–1.1)
LYMPHOCYTES # BLD AUTO: 2.4 10*3/MM3 (ref 0.7–3.1)
LYMPHOCYTES NFR BLD AUTO: 26.6 % (ref 19.6–45.3)
MCH RBC QN AUTO: 28.7 PG (ref 26.6–33)
MCHC RBC AUTO-ENTMCNC: 32.9 G/DL (ref 31.5–35.7)
MCV RBC AUTO: 87.4 FL (ref 79–97)
MONOCYTES # BLD AUTO: 0.2 10*3/MM3 (ref 0.1–0.9)
MONOCYTES NFR BLD AUTO: 2.8 % (ref 5–12)
NEUTROPHILS NFR BLD AUTO: 6.3 10*3/MM3 (ref 1.7–7)
NEUTROPHILS NFR BLD AUTO: 70.2 % (ref 42.7–76)
NRBC BLD AUTO-RTO: 0.1 /100 WBC (ref 0–0.2)
PLATELET # BLD AUTO: 376 10*3/MM3 (ref 140–450)
PMV BLD AUTO: 8.2 FL (ref 6–12)
POTASSIUM SERPL-SCNC: 3.6 MMOL/L (ref 3.5–5.2)
PROT SERPL-MCNC: 8 G/DL (ref 6–8.5)
PROTHROMBIN TIME: 10.3 SECONDS (ref 9.6–11.7)
RBC # BLD AUTO: 5.21 10*6/MM3 (ref 3.77–5.28)
SODIUM SERPL-SCNC: 139 MMOL/L (ref 136–145)
WBC NRBC COR # BLD AUTO: 8.9 10*3/MM3 (ref 3.4–10.8)
WHOLE BLOOD HOLD COAG: NORMAL
WHOLE BLOOD HOLD SPECIMEN: NORMAL

## 2023-12-16 PROCEDURE — 85730 THROMBOPLASTIN TIME PARTIAL: CPT | Performed by: NURSE PRACTITIONER

## 2023-12-16 PROCEDURE — 70450 CT HEAD/BRAIN W/O DYE: CPT

## 2023-12-16 PROCEDURE — 0042T HC CT CEREBRAL PERFUSION W/WO CONTRAST: CPT

## 2023-12-16 PROCEDURE — 99285 EMERGENCY DEPT VISIT HI MDM: CPT

## 2023-12-16 PROCEDURE — 99214 OFFICE O/P EST MOD 30 MIN: CPT | Performed by: PSYCHIATRY & NEUROLOGY

## 2023-12-16 PROCEDURE — G0378 HOSPITAL OBSERVATION PER HR: HCPCS

## 2023-12-16 PROCEDURE — 25510000001 IOPAMIDOL PER 1 ML: Performed by: EMERGENCY MEDICINE

## 2023-12-16 PROCEDURE — 80053 COMPREHEN METABOLIC PANEL: CPT | Performed by: NURSE PRACTITIONER

## 2023-12-16 PROCEDURE — 93005 ELECTROCARDIOGRAM TRACING: CPT

## 2023-12-16 PROCEDURE — 85025 COMPLETE CBC W/AUTO DIFF WBC: CPT | Performed by: NURSE PRACTITIONER

## 2023-12-16 PROCEDURE — 71045 X-RAY EXAM CHEST 1 VIEW: CPT

## 2023-12-16 PROCEDURE — 80162 ASSAY OF DIGOXIN TOTAL: CPT | Performed by: NURSE PRACTITIONER

## 2023-12-16 PROCEDURE — 82948 REAGENT STRIP/BLOOD GLUCOSE: CPT

## 2023-12-16 PROCEDURE — 85610 PROTHROMBIN TIME: CPT | Performed by: NURSE PRACTITIONER

## 2023-12-16 PROCEDURE — 70498 CT ANGIOGRAPHY NECK: CPT

## 2023-12-16 PROCEDURE — 70496 CT ANGIOGRAPHY HEAD: CPT

## 2023-12-16 PROCEDURE — 25010000002 ONDANSETRON PER 1 MG

## 2023-12-16 PROCEDURE — 96374 THER/PROPH/DIAG INJ IV PUSH: CPT

## 2023-12-16 RX ORDER — ONDANSETRON 2 MG/ML
4 INJECTION INTRAMUSCULAR; INTRAVENOUS ONCE
Status: COMPLETED | OUTPATIENT
Start: 2023-12-16 | End: 2023-12-16

## 2023-12-16 RX ORDER — SODIUM CHLORIDE 0.9 % (FLUSH) 0.9 %
10 SYRINGE (ML) INJECTION AS NEEDED
Status: DISCONTINUED | OUTPATIENT
Start: 2023-12-16 | End: 2023-12-18 | Stop reason: HOSPADM

## 2023-12-16 RX ORDER — SODIUM CHLORIDE 0.9 % (FLUSH) 0.9 %
10 SYRINGE (ML) INJECTION EVERY 12 HOURS SCHEDULED
Status: DISCONTINUED | OUTPATIENT
Start: 2023-12-17 | End: 2023-12-18 | Stop reason: HOSPADM

## 2023-12-16 RX ORDER — SODIUM CHLORIDE 9 MG/ML
40 INJECTION, SOLUTION INTRAVENOUS AS NEEDED
Status: DISCONTINUED | OUTPATIENT
Start: 2023-12-16 | End: 2023-12-18 | Stop reason: HOSPADM

## 2023-12-16 RX ORDER — IBUPROFEN 600 MG/1
1 TABLET ORAL
Status: DISCONTINUED | OUTPATIENT
Start: 2023-12-16 | End: 2023-12-18 | Stop reason: HOSPADM

## 2023-12-16 RX ORDER — INSULIN LISPRO 100 [IU]/ML
1-200 INJECTION, SOLUTION INTRAVENOUS; SUBCUTANEOUS
Status: DISCONTINUED | OUTPATIENT
Start: 2023-12-17 | End: 2023-12-18 | Stop reason: HOSPADM

## 2023-12-16 RX ORDER — NICOTINE POLACRILEX 4 MG
15 LOZENGE BUCCAL
Status: DISCONTINUED | OUTPATIENT
Start: 2023-12-16 | End: 2023-12-18 | Stop reason: HOSPADM

## 2023-12-16 RX ORDER — DEXTROSE MONOHYDRATE 25 G/50ML
10-50 INJECTION, SOLUTION INTRAVENOUS
Status: DISCONTINUED | OUTPATIENT
Start: 2023-12-16 | End: 2023-12-18 | Stop reason: HOSPADM

## 2023-12-16 RX ORDER — ASPIRIN 300 MG/1
300 SUPPOSITORY RECTAL ONCE
Status: COMPLETED | OUTPATIENT
Start: 2023-12-16 | End: 2023-12-16

## 2023-12-16 RX ORDER — INSULIN LISPRO 100 [IU]/ML
1-200 INJECTION, SOLUTION INTRAVENOUS; SUBCUTANEOUS AS NEEDED
Status: DISCONTINUED | OUTPATIENT
Start: 2023-12-16 | End: 2023-12-18 | Stop reason: HOSPADM

## 2023-12-16 RX ORDER — ONDANSETRON 2 MG/ML
INJECTION INTRAMUSCULAR; INTRAVENOUS
Status: COMPLETED
Start: 2023-12-16 | End: 2023-12-16

## 2023-12-16 RX ADMIN — IOPAMIDOL 150 ML: 755 INJECTION, SOLUTION INTRAVENOUS at 20:01

## 2023-12-16 RX ADMIN — ASPIRIN 300 MG: 300 SUPPOSITORY RECTAL at 20:36

## 2023-12-16 RX ADMIN — ONDANSETRON 4 MG: 2 INJECTION INTRAMUSCULAR; INTRAVENOUS at 19:49

## 2023-12-16 NOTE — Clinical Note
Level of Care: Telemetry [5]   Admitting Physician: CANDIE JOHNSON [[id number]]   Attending Physician: CANDIE JOHNSON [[id number]]   Bed Request Comments: raza

## 2023-12-17 ENCOUNTER — APPOINTMENT (OUTPATIENT)
Dept: MRI IMAGING | Facility: HOSPITAL | Age: 73
End: 2023-12-17
Payer: MEDICARE

## 2023-12-17 LAB
GLUCOSE BLDC GLUCOMTR-MCNC: 174 MG/DL (ref 70–105)
GLUCOSE BLDC GLUCOMTR-MCNC: 183 MG/DL (ref 70–105)
GLUCOSE BLDC GLUCOMTR-MCNC: 190 MG/DL (ref 70–105)
GLUCOSE BLDC GLUCOMTR-MCNC: 194 MG/DL (ref 70–105)
GLUCOSE BLDC GLUCOMTR-MCNC: 264 MG/DL (ref 70–105)
QT INTERVAL: 394 MS
QTC INTERVAL: 439 MS

## 2023-12-17 PROCEDURE — G0378 HOSPITAL OBSERVATION PER HR: HCPCS

## 2023-12-17 PROCEDURE — 96375 TX/PRO/DX INJ NEW DRUG ADDON: CPT

## 2023-12-17 PROCEDURE — 25010000002 LORAZEPAM PER 2 MG: Performed by: STUDENT IN AN ORGANIZED HEALTH CARE EDUCATION/TRAINING PROGRAM

## 2023-12-17 PROCEDURE — 70551 MRI BRAIN STEM W/O DYE: CPT

## 2023-12-17 PROCEDURE — 25010000002 ONDANSETRON PER 1 MG: Performed by: NURSE PRACTITIONER

## 2023-12-17 PROCEDURE — 82948 REAGENT STRIP/BLOOD GLUCOSE: CPT

## 2023-12-17 PROCEDURE — 63710000001 INSULIN LISPRO (HUMAN) PER 5 UNITS: Performed by: NURSE PRACTITIONER

## 2023-12-17 PROCEDURE — 99214 OFFICE O/P EST MOD 30 MIN: CPT | Performed by: PSYCHIATRY & NEUROLOGY

## 2023-12-17 PROCEDURE — 63710000001 INSULIN GLARGINE PER 5 UNITS: Performed by: NURSE PRACTITIONER

## 2023-12-17 PROCEDURE — 92610 EVALUATE SWALLOWING FUNCTION: CPT

## 2023-12-17 PROCEDURE — 97166 OT EVAL MOD COMPLEX 45 MIN: CPT

## 2023-12-17 RX ORDER — ONDANSETRON 2 MG/ML
4 INJECTION INTRAMUSCULAR; INTRAVENOUS EVERY 6 HOURS PRN
Status: DISCONTINUED | OUTPATIENT
Start: 2023-12-17 | End: 2023-12-18 | Stop reason: HOSPADM

## 2023-12-17 RX ORDER — HYDRALAZINE HYDROCHLORIDE 20 MG/ML
20 INJECTION INTRAMUSCULAR; INTRAVENOUS EVERY 6 HOURS PRN
Status: DISCONTINUED | OUTPATIENT
Start: 2023-12-17 | End: 2023-12-18 | Stop reason: HOSPADM

## 2023-12-17 RX ORDER — LORAZEPAM 2 MG/ML
1 INJECTION INTRAMUSCULAR ONCE
Status: COMPLETED | OUTPATIENT
Start: 2023-12-17 | End: 2023-12-17

## 2023-12-17 RX ADMIN — INSULIN LISPRO 2 UNITS: 100 INJECTION, SOLUTION INTRAVENOUS; SUBCUTANEOUS at 21:20

## 2023-12-17 RX ADMIN — INSULIN GLARGINE 21 UNITS: 100 INJECTION, SOLUTION SUBCUTANEOUS at 21:20

## 2023-12-17 RX ADMIN — ONDANSETRON 4 MG: 2 INJECTION INTRAMUSCULAR; INTRAVENOUS at 02:06

## 2023-12-17 RX ADMIN — Medication 10 ML: at 20:30

## 2023-12-17 RX ADMIN — Medication 10 ML: at 08:47

## 2023-12-17 RX ADMIN — INSULIN LISPRO 1 UNITS: 100 INJECTION, SOLUTION INTRAVENOUS; SUBCUTANEOUS at 08:50

## 2023-12-17 RX ADMIN — INSULIN LISPRO 7 UNITS: 100 INJECTION, SOLUTION INTRAVENOUS; SUBCUTANEOUS at 17:24

## 2023-12-17 RX ADMIN — INSULIN GLARGINE 19 UNITS: 100 INJECTION, SOLUTION SUBCUTANEOUS at 00:49

## 2023-12-17 RX ADMIN — LORAZEPAM 1 MG: 2 INJECTION INTRAMUSCULAR; INTRAVENOUS at 15:20

## 2023-12-17 RX ADMIN — Medication 10 ML: at 00:50

## 2023-12-17 NOTE — ED PROVIDER NOTES
Subjective   Chief Complaint   Patient presents with    Vomiting       History of Present Illness  Patient is a 73-year-old female presents emergency department with a report of headache, dizziness, facial and tongue numbness.  She reports she woke up having dizziness this morning, at 4 PM this afternoon she began having facial numbness, tongue numbness.  Denies any extremity weakness.  No unilateral weakness, speech disturbances or visual disturbances.  No headache.   Review of Systems   Constitutional:  Negative for chills and fever.   Eyes:  Negative for photophobia and visual disturbance.   Respiratory:  Negative for shortness of breath.    Cardiovascular:  Negative for chest pain.   Gastrointestinal:  Positive for vomiting. Negative for abdominal pain, diarrhea and nausea.   Musculoskeletal:  Negative for back pain and neck pain.   Skin:  Negative for color change and rash.   Neurological:  Positive for dizziness and numbness. Negative for tremors, seizures, syncope, facial asymmetry, speech difficulty, weakness, light-headedness and headaches.       Past Medical History:   Diagnosis Date    Anxiety     Asthma     COVID-19     Diagnosed with positive test 8/4/22.    CVA (cerebral vascular accident)     x3    Depression     Diabetes     Diabetic neuropathy     Former smoker     GERD (gastroesophageal reflux disease)     Hypertension     Hypothyroidism     Mitral stenosis     Mitral valve annular calcification     Peptic ulceration     PONV (postoperative nausea and vomiting)     Psoriasis     Vertigo        Allergies   Allergen Reactions    Codeine GI Intolerance       Past Surgical History:   Procedure Laterality Date    CARDIAC CATHETERIZATION      CARDIAC CATHETERIZATION N/A 02/07/2022    Procedure: Coronary angiography;  Surgeon: Eugene Lundy MD;  Location: [facility] INVASIVE LOCATION;  Service: Cardiovascular;  Laterality: N/A;    CARDIAC CATHETERIZATION N/A 02/07/2022    Procedure: Left heart  cath;  Surgeon: Eugene Lundy MD;  Location: [facility] CATH INVASIVE LOCATION;  Service: Cardiovascular;  Laterality: N/A;    CARDIAC CATHETERIZATION N/A 2022    Procedure: Left ventriculography;  Surgeon: Eugene Lundy MD;  Location:  [facility] CATH INVASIVE LOCATION;  Service: Cardiovascular;  Laterality: N/A;    CARDIAC ELECTROPHYSIOLOGY PROCEDURE N/A 2022    Procedure: LOOP INSERTION linq;  Surgeon: Eugene Lundy MD;  Location: [facility] CATH INVASIVE LOCATION;  Service: Cardiovascular;  Laterality: N/A;    CHOLECYSTECTOMY  2000    HYSTERECTOMY         Family History   Problem Relation Age of Onset    Heart failure Mother     Lung cancer Father     Stroke Sister     Breast cancer Neg Hx     Ovarian cancer Neg Hx        Social History     Socioeconomic History    Marital status:    Tobacco Use    Smoking status: Former     Packs/day: 1.00     Years: 20.00     Additional pack years: 0.00     Total pack years: 20.00     Types: Cigarettes     Start date:      Quit date: 2011     Years since quittin.8    Smokeless tobacco: Never    Tobacco comments:     Quit around    Vaping Use    Vaping Use: Never used   Substance and Sexual Activity    Alcohol use: Never    Drug use: Never    Sexual activity: Not Currently           Objective   Physical Exam  Vitals and nursing note reviewed.   Constitutional:       Appearance: Normal appearance. She is not toxic-appearing.   HENT:      Head: Normocephalic and atraumatic.      Nose: Nose normal.      Mouth/Throat:      Mouth: Mucous membranes are moist.      Pharynx: Oropharynx is clear.   Eyes:      Extraocular Movements: Extraocular movements intact.      Conjunctiva/sclera: Conjunctivae normal.      Pupils: Pupils are equal, round, and reactive to light.   Cardiovascular:      Rate and Rhythm: Normal rate and regular rhythm.      Heart sounds: Normal heart sounds. No murmur heard.     No friction rub. No gallop.   Pulmonary:       Effort: Pulmonary effort is normal.      Breath sounds: Normal breath sounds.   Abdominal:      General: Bowel sounds are normal.      Palpations: Abdomen is soft.   Musculoskeletal:      Cervical back: Normal range of motion and neck supple.   Skin:     General: Skin is warm and dry.      Capillary Refill: Capillary refill takes less than 2 seconds.   Neurological:      Mental Status: She is alert and oriented to person, place, and time.      GCS: GCS eye subscore is 4. GCS verbal subscore is 5. GCS motor subscore is 6.      Cranial Nerves: Facial asymmetry present.      Sensory: Sensation is intact.      Motor: Motor function is intact.         Procedures           ED Course  ED Course as of 12/17/23 0149   Sat Dec 16, 2023   1940 Discussed with Dr. Lubin. Code stroke initiated [LB]   1942 Ct head, cta recommendation per [facility] teleneurology. NIH 1.  [LB]   2017 Consult to neurology, no intervention at this time  Rectal aspirin [LB]   2111 Consult with NATALY Chun [LB]      ED Course User Index  [LB] Peggy Larkin APRN                          Total (NIH Stroke Scale): 3                        Medical Decision Making  Problems Addressed:  Acute stroke due to ischemia: complicated acute illness or injury  Dizziness: complicated acute illness or injury  Facial numbness: complicated acute illness or injury    Amount and/or Complexity of Data Reviewed  Labs: ordered.  Radiology: ordered.    Risk  OTC drugs.  Prescription drug management.  Decision regarding hospitalization.    Discussed with Dr. Lubin, ED attending physician  Chart Review: Discharge summary 12/4/2023 for acute left pontine stroke.    Labs: CBC CMP reviewed.  Redoxon 1.5, glucose 236.    Imaging: CT Angiogram Neck    Result Date: 12/16/2023  Impression: Diminutive appearance of the anterior cerebral arteries bilaterally, right greater than left, similar as compared to the previous study. Diminutive appearance of the right PCA  which appears similar as compared to the previous study. No large vessel occlusion identified. No additional evidence of hemodynamically significant stenosis, AVM or aneurysm within the head or neck by NASCET criteria. Electronically Signed: Carly Meredith MD  12/16/2023 8:26 PM EST  Workstation ID: [id number]    CT Angiogram Head w AI Analysis of LVO    Result Date: 12/16/2023  Impression: Diminutive appearance of the anterior cerebral arteries bilaterally, right greater than left, similar as compared to the previous study. Diminutive appearance of the right PCA which appears similar as compared to the previous study. No large vessel occlusion identified. No additional evidence of hemodynamically significant stenosis, AVM or aneurysm within the head or neck by NASCET criteria. Electronically Signed: Carly Meredith MD  12/16/2023 8:26 PM EST  Workstation ID: [id number]    CT CEREBRAL PERFUSION WITH & WITHOUT CONTRAST    Addendum Date: 12/16/2023    ADDENDUM #1 The perfusion abnormality seen within the right parietal and temporal lobes is related to the recent infarct as seen on MRI 12/1/2023. Acute on chronic infarct cannot be excluded particularly within the paramedian right frontal and parietal lobes which may be related to underlying anterior cerebral artery irregularity and diminutive appearance which was present on CT angiogram 12/2/2023. Again MRI evaluation may be warranted to evaluate for new areas of infarction. Electronically Signed: Carly Meredith MD  12/16/2023 8:18 PM EST  Workstation ID: [id number] ORIGINAL REPORT: CT CEREBRAL PERFUSION W WO CONTRAST Date of Exam: 12/16/2023 8:00 PM EST Indication: Neuro deficit, acute, stroke suspected.  Comparison: None available. Technique: Axial CT images of the brain were obtained prior to and after the administration of iodinated contrast. CT Perfusion protocol was utilized. Automated post processing was performed by RAPID software and submitted to PACS for interpretation.  Automated exposure control and iterative reconstruction was utilized. Findings: The cerebral blood flow less than 30% is 19 mL. Tmax greater than 6 seconds is 42 mL. Mismatch volume is 23 mL. Mismatch ratio is 2.2. Perfusion defect present within the right temporal lobe extending to the right parietal lobe. Questionable mild perfusion defect seen within the paramedian right frontal and parietal lobes. Impression: 1.Perfusion defect within the right temporal lobe extending to the right parietal lobe with a mismatch volume of 23 mL. Mismatch ratio is 2.2. Given the degree of hypodensity present, this is age-indeterminate. 2.Questionable mild perfusion defect seen within the paramedian right frontal and parietal lobes. MRI evaluation may be warranted. Electronically Signed: Carly Meredith MD  12/16/2023 8:14 PM EST  Workstation ID: [id number]    Result Date: 12/16/2023  Impression: 1.Perfusion defect within the right temporal lobe extending to the right parietal lobe with a mismatch volume of 23 mL. Mismatch ratio is 2.2. Given the degree of hypodensity present, this is age-indeterminate. 2.Questionable mild perfusion defect seen within the paramedian right frontal and parietal lobes. MRI evaluation may be warranted. Electronically Signed: Carly Meredith MD  12/16/2023 8:14 PM EST  Workstation ID: [id number]    XR Chest 1 View    Result Date: 12/16/2023  Impression: No acute cardiopulmonary process. Electronically Signed: Carly Meredith MD  12/16/2023 8:08 PM EST  Workstation ID: [id number]    CT Head Without Contrast Stroke Protocol    Result Date: 12/16/2023  Impression: No acute intracranial process identified. Electronically Signed: Dashawn Todd MD  12/16/2023 6:57 PM CST  Workstation ID: [id number]   Discussion/Consultation with other providers: Consultation to neurologist via telemedicine, hospitalist for admission, and ED attending physician  Patient is a 73-year-old female, recently discharged from [facility] for  acute stroke, presented back to the emergency department tonight with vomiting, facial numbness, tingling and a headache.  Her facial numbness, tingling headache began around 4 PM.  Upon my evaluating the patient, code stroke was initiated and consultation with the ED attending physician.  Patient was taken to CT, evaluated with stroke neurology.  Please see neurology note.  Patient has previous CVA earlier in this month TNK contraindicated. No intervention at this time for thrombectomy.   Imaging reviewed by neurology, confirmed with radiologist.  Patient will be given rectal aspirin, admitted to the hospitalist service for further evaluation and management.  Please see nursing notes for NIH.  Disposition: Patient will be admitted to hospitalist service for further evaluation and management of acute stroke.  Based on the clinical findings at this time for this patient, anticipate the patient require a 2 midnight stay.  I have personally provided 35 minutes of critical care time exclusive of time spent on separately billable procedures.  Time includes review of laboratory data, radiology results, discussion with specialist, ED attending, monitoring for potential decompensation.  Interventions were performed as documented as above.  Note Disclaimer: At [facility], we believe that sharing information builds trust and better relationships. You are receiving this note because you recently visited [facility]. It is possible you will see health information before a provider has talked with you about it. This kind of information can be easy to misunderstand. To help you fully understand what it means for your health, we urge you to discuss this note with your provider.Note dictated utilizing Dragon Dictation. Appropriate PPE worn during patient interactions.        Final diagnoses:   Acute stroke due to ischemia   Dizziness   Facial numbness       ED Disposition  ED Disposition       ED Disposition   Decision to  Admit    Condition   --    Comment   Level of Care: Telemetry [5]   Diagnosis: Facial numbness [978684]   Admitting Physician: CANDIE JOHNSON [[id number]]   Attending Physician: CANDIE JOHNSON [[id number]]                 No follow-up provider specified.       Medication List      No changes were made to your prescriptions during this visit.            Peggy Larkin, APRN  12/17/23 0206

## 2023-12-17 NOTE — PLAN OF CARE
Problem: Adult Inpatient Plan of Care  Goal: Plan of Care Review  Outcome: Ongoing, Progressing  Flowsheets (Taken 12/17/2023 0600)  Progress: no change  Plan of Care Reviewed With: patient  Goal: Patient-Specific Goal (Individualized)  Outcome: Ongoing, Progressing  Goal: Absence of Hospital-Acquired Illness or Injury  Outcome: Ongoing, Progressing  Intervention: Identify and Manage Fall Risk  Recent Flowsheet Documentation  Taken 12/17/2023 0600 by Amie Emerson RN  Safety Promotion/Fall Prevention: safety round/check completed  Taken 12/17/2023 0350 by Amie Emerson RN  Safety Promotion/Fall Prevention: safety round/check completed  Taken 12/17/2023 0200 by Amie Emerson RN  Safety Promotion/Fall Prevention: safety round/check completed  Taken 12/17/2023 0000 by Amie Emerson RN  Safety Promotion/Fall Prevention:   activity supervised   assistive device/personal items within reach   clutter free environment maintained   fall prevention program maintained   lighting adjusted   nonskid shoes/slippers when out of bed   safety round/check completed  Taken 12/16/2023 2140 by Amie Emerson RN  Safety Promotion/Fall Prevention:   activity supervised   assistive device/personal items within reach   clutter free environment maintained   fall prevention program maintained   lighting adjusted   nonskid shoes/slippers when out of bed   safety round/check completed  Intervention: Prevent Skin Injury  Recent Flowsheet Documentation  Taken 12/17/2023 0350 by Amie Emerson RN  Body Position: weight shifting  Taken 12/17/2023 0200 by Amie Emerson RN  Body Position: position changed independently  Taken 12/17/2023 0000 by Amie Emerson RN  Body Position: position changed independently  Skin Protection:   adhesive use limited   tubing/devices free from skin contact  Taken 12/16/2023 2140 by Amie Emerson RN  Body Position: weight shifting  Skin Protection:   adhesive use limited   incontinence pads  utilized   tubing/devices free from skin contact  Intervention: Prevent and Manage VTE (Venous Thromboembolism) Risk  Recent Flowsheet Documentation  Taken 12/17/2023 0000 by Amie Emerson RN  VTE Prevention/Management:   bilateral   sequential compression devices on  Taken 12/16/2023 2344 by Amie Emerson RN  VTE Prevention/Management:   bilateral   sequential compression devices on  Taken 12/16/2023 2140 by Amie Emerson RN  Activity Management:   dorsiflexion/plantar flexion performed   ambulated to bathroom   back to bed  VTE Prevention/Management:   bilateral   sequential compression devices on  Range of Motion: active ROM (range of motion) encouraged  Intervention: Prevent Infection  Recent Flowsheet Documentation  Taken 12/17/2023 0600 by Amie Emerson RN  Infection Prevention:   equipment surfaces disinfected   hand hygiene promoted   rest/sleep promoted   single patient room provided  Taken 12/17/2023 0350 by Amie Emerson RN  Infection Prevention:   equipment surfaces disinfected   hand hygiene promoted   rest/sleep promoted   single patient room provided  Taken 12/17/2023 0200 by Amie Emerson RN  Infection Prevention:   equipment surfaces disinfected   hand hygiene promoted   rest/sleep promoted   single patient room provided  Taken 12/17/2023 0000 by Amie Emerson RN  Infection Prevention:   equipment surfaces disinfected   hand hygiene promoted   rest/sleep promoted   single patient room provided  Taken 12/16/2023 2140 by Amie Emerson RN  Infection Prevention:   equipment surfaces disinfected   hand hygiene promoted   rest/sleep promoted   single patient room provided  Goal: Optimal Comfort and Wellbeing  Outcome: Ongoing, Progressing  Intervention: Provide Person-Centered Care  Recent Flowsheet Documentation  Taken 12/16/2023 2140 by Amie Emerson RN  Trust Relationship/Rapport:   care explained   thoughts/feelings acknowledged  Goal: Readiness for Transition of  Care  Outcome: Ongoing, Progressing  Intervention: Mutually Develop Transition Plan  Recent Flowsheet Documentation  Taken 12/16/2023 2202 by Amie Emerson RN  Transportation Anticipated: family or friend will provide  Patient/Family Anticipated Services at Transition:   Patient/Family Anticipates Transition to: home  Taken 12/16/2023 2201 by Amie Emerson RN  Equipment Currently Used at Home: grab bar     Problem: Asthma Comorbidity  Goal: Maintenance of Asthma Control  Outcome: Ongoing, Progressing  Intervention: Maintain Asthma Symptom Control  Recent Flowsheet Documentation  Taken 12/17/2023 0000 by Amie Emerson RN  Medication Review/Management: medications reviewed  Taken 12/16/2023 2140 by Amie Emerson RN  Medication Review/Management: medications reviewed     Problem: Diabetes Comorbidity  Goal: Blood Glucose Level Within Targeted Range  Outcome: Ongoing, Progressing  Intervention: Monitor and Manage Glycemia  Recent Flowsheet Documentation  Taken 12/17/2023 0000 by Amie Emerson RN  Glycemic Management: blood glucose monitored  Taken 12/16/2023 2140 by Amie Emerson RN  Glycemic Management: blood glucose monitored     Problem: Adjustment to Illness (Stroke, Ischemic/Transient Ischemic Attack)  Goal: Optimal Coping  Outcome: Ongoing, Progressing  Intervention: Support Psychosocial Response to Stroke  Recent Flowsheet Documentation  Taken 12/16/2023 2140 by Amie Emerson RN  Family/Support System Care:   support provided   self-care encouraged     Problem: Bowel Elimination Impaired (Stroke, Ischemic/Transient Ischemic Attack)  Goal: Effective Bowel Elimination  Outcome: Ongoing, Progressing     Problem: Cerebral Tissue Perfusion (Stroke, Ischemic/Transient Ischemic Attack)  Goal: Optimal Cerebral Tissue Perfusion  Outcome: Ongoing, Progressing     Problem: Cognitive Impairment (Stroke, Ischemic/Transient Ischemic Attack)  Goal: Optimal Cognitive Function  Outcome: Ongoing,  Progressing     Problem: Communication Impairment (Stroke, Ischemic/Transient Ischemic Attack)  Goal: Improved Communication Skills  Outcome: Ongoing, Progressing  Intervention: Optimize Communication Skills  Recent Flowsheet Documentation  Taken 12/17/2023 0350 by Amie Emerson RN  Communication Enhancement Strategies:   call light answered in person   communication board used  Taken 12/17/2023 0000 by Amie Emerson RN  Communication Enhancement Strategies:   call light answered in person   communication board used  Taken 12/16/2023 2140 by Amie Emerson RN  Communication Enhancement Strategies:   call light answered in person   communication board used     Problem: Functional Ability Impaired (Stroke, Ischemic/Transient Ischemic Attack)  Goal: Optimal Functional Ability  Outcome: Ongoing, Progressing  Intervention: Optimize Functional Ability  Recent Flowsheet Documentation  Taken 12/16/2023 2140 by Amie Emerson RN  Activity Management:   dorsiflexion/plantar flexion performed   ambulated to bathroom   back to bed     Problem: Respiratory Compromise (Stroke, Ischemic/Transient Ischemic Attack)  Goal: Effective Oxygenation and Ventilation  Outcome: Ongoing, Progressing  Intervention: Optimize Oxygenation and Ventilation  Recent Flowsheet Documentation  Taken 12/16/2023 2140 by Amie Emerson RN  Head of Bed (HOB) Positioning: HOB at 30-45 degrees     Problem: Sensorimotor Impairment (Stroke, Ischemic/Transient Ischemic Attack)  Goal: Improved Sensorimotor Function  Outcome: Ongoing, Progressing  Intervention: Optimize Range of Motion, Motor Control and Function  Recent Flowsheet Documentation  Taken 12/16/2023 2140 by Amie Emerson RN  Positioning/Transfer Devices:   pillows   in use  Range of Motion: active ROM (range of motion) encouraged     Problem: Swallowing Impairment (Stroke, Ischemic/Transient Ischemic Attack)  Goal: Optimal Eating and Swallowing without Aspiration  Outcome: Ongoing,  Progressing     Problem: Urinary Elimination Impaired (Stroke, Ischemic/Transient Ischemic Attack)  Goal: Effective Urinary Elimination  Outcome: Ongoing, Progressing   Goal Outcome Evaluation:  Plan of Care Reviewed With: patient        Progress: no change

## 2023-12-17 NOTE — ED NOTES
Nursing report ED to floor  Lula Burnett  73 y.o.  female    HPI:   Chief Complaint   Patient presents with    Vomiting       Admitting doctor:   Varinder Gallardo MD    Admitting diagnosis:   The primary encounter diagnosis was Acute stroke due to ischemia. Diagnoses of Dizziness and Facial numbness were also pertinent to this visit.    Code status:   Current Code Status       Date Active Code Status Order ID Comments User Context       Prior            Allergies:   Codeine    Isolation:  No active isolations     Fall Risk:  Fall Risk Assessment was completed, and patient is at moderate risk for falls.   Predictive Model Details         18 (Low) Factor Value    Calculated 12/16/2023 21:19 Age 73    Risk of Fall Model Musculoskeletal Assessment WDL     Respiratory Rate 30     Active Peripheral IV Present     Imaging order in this encounter Present     Skin Assessment WDL     Financial Class Other     Magnesium not on file     Number of Distinct Medication Classes administered 3     Drug Use No     Tobacco Use Quit     Edwin Scale not on file     Diastolic BP 73     Chloride 97 mmol/L     Creatinine 1.28 mg/dL     Peripheral Vascular Assessment WDL     ALT 89 U/L     Cardiac Assessment WDL     Total Bilirubin 0.5 mg/dL     Gastrointestinal Assessment X     Calcium 10.4 mg/dL     Days after Admission 0.079     Albumin 4.6 g/dL     Potassium 3.6 mmol/L         Weight:       12/16/23 1929   Weight: 73.9 kg (163 lb)       Intake and Output  No intake or output data in the 24 hours ending 12/16/23 2119    Diet:   Dietary Orders (From admission, onward)       Start     Ordered    12/16/23 1942  NPO Diet NPO Type: Strict NPO  (Inpatient Code Stroke)  Diet Effective Now        Comments: Until Dysphagia Screen Complete   Question:  NPO Type  Answer:  Strict NPO    12/16/23 1943                     Most recent vitals:   Vitals:    12/16/23 2015 12/16/23 2041 12/16/23 2046 12/16/23 2101   BP: 167/72 134/73 158/68 155/73    Pulse: 80 82 80 81   Resp:       Temp:       TempSrc:       SpO2: 100% 100% 100% 100%   Weight:       Height:           Active LDAs/IV Access:   Lines, Drains & Airways       Active LDAs       Name Placement date Placement time Site Days    Peripheral IV 12/16/23 1935 Right Antecubital 12/16/23 1935  Antecubital  less than 1                    Skin Condition:   Skin Assessments (last day)       Date/Time Interval    12/16/23 19:50:35 baseline    12/16/23 2015 baseline             Labs (abnormal labs have a star):   Labs Reviewed   DIGOXIN LEVEL - Abnormal; Notable for the following components:       Result Value    Digoxin 1.50 (*)     All other components within normal limits   COMPREHENSIVE METABOLIC PANEL - Abnormal; Notable for the following components:    Glucose 224 (*)     BUN 29 (*)     Creatinine 1.28 (*)     Chloride 97 (*)     CO2 21.0 (*)     ALT (SGPT) 89 (*)     AST (SGOT) 58 (*)     Anion Gap 21.0 (*)     eGFR 43.8 (*)     All other components within normal limits    Narrative:     GFR Normal >60  Chronic Kidney Disease <60  Kidney Failure <15    The GFR formula is only valid for adults with stable renal function between ages 18 and 70.   APTT - Abnormal; Notable for the following components:    PTT 26.1 (*)     All other components within normal limits   CBC WITH AUTO DIFFERENTIAL - Abnormal; Notable for the following components:    RDW 16.8 (*)     Monocyte % 2.8 (*)     Eosinophil % 0.1 (*)     All other components within normal limits   POCT GLUCOSE FINGERSTICK - Abnormal; Notable for the following components:    Glucose 236 (*)     All other components within normal limits   PROTIME-INR - Normal   RAINBOW DRAW    Narrative:     The following orders were created for panel order [place] Draw.  Procedure                               Abnormality         Status                     ---------                               -----------         ------                     Green Top (Gel)[[id number]]                                   Final result               Lavender Top[[id number]]                                     Final result               Gold Top - SST[[id number]]                                   Final result               Light Blue Top[[id number]]                                   Final result                 Please view results for these tests on the individual orders.   GREEN TOP   LAVENDER TOP   GOLD TOP - SST   LIGHT BLUE TOP   CBC AND DIFFERENTIAL    Narrative:     The following orders were created for panel order CBC & Differential.  Procedure                               Abnormality         Status                     ---------                               -----------         ------                     CBC Auto Differential[[id number]]        Abnormal            Final result                 Please view results for these tests on the individual orders.       LOC: Person, Place, Time, and Situation    Telemetry:  Telemetry    Cardiac Monitoring Ordered: yes    EKG:   ECG 12 Lead Other; Dizziness   Preliminary Result   HEART RATE= 75  bpm   RR Interval= 804  ms   TN Interval= 174  ms   P Horizontal Axis= -17  deg   P Front Axis= 73  deg   QRSD Interval= 86  ms   QT Interval= 394  ms   QTcB= 439  ms   QRS Axis= -44  deg   T Wave Axis= 194  deg   - ABNORMAL ECG -   Sinus rhythm   Left anterior fascicular block   LVH with secondary repolarization abnormality   When compared with ECG of 01-Dec-2023 18:09:17,   Nonspecific significant change   Electronically Signed By:    Date and Time of Study: 2023-12-16 19:37:33          Medications Given in the ED:   Medications   sodium chloride 0.9 % flush 10 mL (has no administration in time range)   ondansetron (ZOFRAN) injection 4 mg (4 mg Intravenous Given 12/16/23 1949)   iopamidol (ISOVUE-370) 76 % injection 150 mL (150 mL Intravenous Given 12/16/23 2001)   aspirin suppository 300 mg (300 mg Rectal Given 12/16/23 2036)       Imaging results:  CT Angiogram  Neck    Result Date: 12/16/2023  Impression: Diminutive appearance of the anterior cerebral arteries bilaterally, right greater than left, similar as compared to the previous study. Diminutive appearance of the right PCA which appears similar as compared to the previous study. No large vessel occlusion identified. No additional evidence of hemodynamically significant stenosis, AVM or aneurysm within the head or neck by NASCET criteria. Electronically Signed: Carly Meredith MD  12/16/2023 8:26 PM EST  Workstation ID: [id number]    CT Angiogram Head w AI Analysis of LVO    Result Date: 12/16/2023  Impression: Diminutive appearance of the anterior cerebral arteries bilaterally, right greater than left, similar as compared to the previous study. Diminutive appearance of the right PCA which appears similar as compared to the previous study. No large vessel occlusion identified. No additional evidence of hemodynamically significant stenosis, AVM or aneurysm within the head or neck by NASCET criteria. Electronically Signed: Carly Meredith MD  12/16/2023 8:26 PM EST  Workstation ID: [id number]    CT CEREBRAL PERFUSION WITH & WITHOUT CONTRAST    Addendum Date: 12/16/2023    ADDENDUM #1 The perfusion abnormality seen within the right parietal and temporal lobes is related to the recent infarct as seen on MRI 12/1/2023. Acute on chronic infarct cannot be excluded particularly within the paramedian right frontal and parietal lobes which may be related to underlying anterior cerebral artery irregularity and diminutive appearance which was present on CT angiogram 12/2/2023. Again MRI evaluation may be warranted to evaluate for new areas of infarction. Electronically Signed: Carly Meredith MD  12/16/2023 8:18 PM EST  Workstation ID: [id number] ORIGINAL REPORT: CT CEREBRAL PERFUSION W WO CONTRAST Date of Exam: 12/16/2023 8:00 PM EST Indication: Neuro deficit, acute, stroke suspected.  Comparison: None available. Technique: Axial CT images of  the brain were obtained prior to and after the administration of iodinated contrast. CT Perfusion protocol was utilized. Automated post processing was performed by RAPID software and submitted to PACS for interpretation. Automated exposure control and iterative reconstruction was utilized. Findings: The cerebral blood flow less than 30% is 19 mL. Tmax greater than 6 seconds is 42 mL. Mismatch volume is 23 mL. Mismatch ratio is 2.2. Perfusion defect present within the right temporal lobe extending to the right parietal lobe. Questionable mild perfusion defect seen within the paramedian right frontal and parietal lobes. Impression: 1.Perfusion defect within the right temporal lobe extending to the right parietal lobe with a mismatch volume of 23 mL. Mismatch ratio is 2.2. Given the degree of hypodensity present, this is age-indeterminate. 2.Questionable mild perfusion defect seen within the paramedian right frontal and parietal lobes. MRI evaluation may be warranted. Electronically Signed: Carly Meredith MD  12/16/2023 8:14 PM EST  Workstation ID: [id number]    Result Date: 12/16/2023  Impression: 1.Perfusion defect within the right temporal lobe extending to the right parietal lobe with a mismatch volume of 23 mL. Mismatch ratio is 2.2. Given the degree of hypodensity present, this is age-indeterminate. 2.Questionable mild perfusion defect seen within the paramedian right frontal and parietal lobes. MRI evaluation may be warranted. Electronically Signed: Carly Meredith MD  12/16/2023 8:14 PM EST  Workstation ID: [id number]    XR Chest 1 View    Result Date: 12/16/2023  Impression: No acute cardiopulmonary process. Electronically Signed: Carly Meredith MD  12/16/2023 8:08 PM EST  Workstation ID: [id number]    CT Head Without Contrast Stroke Protocol    Result Date: 12/16/2023  Impression: No acute intracranial process identified. Electronically Signed: Dashawn Todd MD  12/16/2023 6:57 PM CST  Workstation ID: [id number]     Social  issues:   Social History     Socioeconomic History    Marital status:    Tobacco Use    Smoking status: Former     Packs/day: 1.00     Years: 20.00     Additional pack years: 0.00     Total pack years: 20.00     Types: Cigarettes     Start date:      Quit date: 2011     Years since quittin.8    Smokeless tobacco: Never    Tobacco comments:     Quit around    Vaping Use    Vaping Use: Never used   Substance and Sexual Activity    Alcohol use: Never    Drug use: Never    Sexual activity: Not Currently       NIH Stroke Scale:  Interval: baseline (23)  1a. Level of Consciousness: 0-->Alert, keenly responsive (23)  1b. LOC Questions: 0-->Answers both questions correctly (23)  1c. LOC Commands: 0-->Performs both tasks correctly (23)  2. Best Gaze: 0-->Normal (R eye adduction paralysis) (23)  3. Visual: 0-->No visual loss (23)  4. Facial Palsy: 1-->Minor paralysis (flattened nasolabial fold, asymmetry on smiling) (23)  5a. Motor Arm, Left: 0-->No drift, limb holds 90 (or 45) degrees for full 10 secs (23)  5b. Motor Arm, Right: 0-->No drift, limb holds 90 (or 45) degrees for full 10 secs (23)  6a. Motor Leg, Left: 0-->No drift, leg holds 30 degree position for full 5 secs (23)  6b. Motor Leg, Right: 0-->No drift, leg holds 30 degree position for full 5 secs (23)  7. Limb Ataxia: 0-->Absent (23)  8. Sensory: 1-->Mild-to-moderate sensory loss, patient feels pinprick is less sharp or is dull on the affected side, or there is a loss of superficial pain with pinprick, but patient is aware of being touched (bilateral face) (23)  9. Best Language: 0-->No aphasia, normal (23)  10. Dysarthria: 1-->Mild-to-moderate dysarthria, patient slurs at least some words and, at worst, can be understood with some difficulty (23)  11. Extinction and  Inattention (formerly Neglect): 0-->No abnormality (12/16/23 2015)    Total (NIH Stroke Scale): 3 (12/16/23 2015)     Additional notable assessment information:     Nursing report ED to floor:      Kari Pierce RN   12/16/23 21:19 EST

## 2023-12-17 NOTE — OUTREACH NOTE
Stroke Week 3 Survey      Flowsheet Row Responses   Denominational facility patient discharged from? [facility]   Does the patient have one of the following disease processes/diagnoses(primary or secondary)? Stroke   Week 3 attempt successful? No   Unsuccessful attempts Attempt 1   Revoke Readmitted            CELENA HELMS - Registered Nurse

## 2023-12-17 NOTE — PLAN OF CARE
Goal Outcome Evaluation:  Plan of Care Reviewed With: patient        Progress: no change  Outcome Evaluation: Pt is a 72 y/o F admitted to [facility] 12/16/23 with c/o facial numbness, N/V, and visual changes. Pt recently admitted to [facility] x2 weeks prior with L pontine CVA, symptoms resolved and dc home stable. CT cerebral perfusion indicates perfusion defect within R temporal lobe extending to R parietal lobe, questionable mild perfusion defect seen within paramedian R frontal and parietal lobes. MRI brain pending. PMHx significant for x3 CVA with memory loss, DM, anxiety, depression, and A. Fib. At baseline pt resides in RV, independent with ADLs and no AD for mobility. Pt reports baseline has slight decrease in balance and occasional dizziness. This date, pt A&Ox4 on RA and in supine upon arrival. Pt completes bed mobility with mod I, comes to standing with SBA, however requires CGA-Gabriella for functional mobility with HHA, noted to have several LOB requiring increased assist. Pt demo good activity tolerance and global strength. Pt would benefit from use of RW to increase safety with functional mobility, as well as PT evaluation for balance training. Pt likely safe to dc home with family, PT evaluation pending for other recommendations. Pt does not demo deficits that warrant skilled OT intervention within acute setting. OT will complete orders at this time.      Anticipated Discharge Disposition (OT): home with assist

## 2023-12-17 NOTE — PLAN OF CARE
Problem: Adult Inpatient Plan of Care  Goal: Plan of Care Review  Outcome: Ongoing, Progressing  Goal: Patient-Specific Goal (Individualized)  Outcome: Ongoing, Progressing  Goal: Absence of Hospital-Acquired Illness or Injury  Outcome: Ongoing, Progressing  Intervention: Prevent Skin Injury  Recent Flowsheet Documentation  Taken 12/17/2023 0853 by Juli Otoloe LPN  Skin Protection:   adhesive use limited   tubing/devices free from skin contact  Intervention: Prevent and Manage VTE (Venous Thromboembolism) Risk  Recent Flowsheet Documentation  Taken 12/17/2023 0853 by Juli Otoole LPN  VTE Prevention/Management:   bilateral   sequential compression devices on  Range of Motion: active ROM (range of motion) encouraged  Intervention: Prevent Infection  Recent Flowsheet Documentation  Taken 12/17/2023 0853 by Juli Otoole LPN  Infection Prevention: hand hygiene promoted  Goal: Optimal Comfort and Wellbeing  Outcome: Ongoing, Progressing  Intervention: Provide Person-Centered Care  Recent Flowsheet Documentation  Taken 12/17/2023 0853 by Juli Otoole LPN  Trust Relationship/Rapport:   care explained   thoughts/feelings acknowledged  Goal: Readiness for Transition of Care  Outcome: Ongoing, Progressing     Problem: Asthma Comorbidity  Goal: Maintenance of Asthma Control  Outcome: Ongoing, Progressing     Problem: Diabetes Comorbidity  Goal: Blood Glucose Level Within Targeted Range  Outcome: Ongoing, Progressing     Problem: Adjustment to Illness (Stroke, Ischemic/Transient Ischemic Attack)  Goal: Optimal Coping  Outcome: Ongoing, Progressing  Intervention: Support Psychosocial Response to Stroke  Recent Flowsheet Documentation  Taken 12/17/2023 0853 by Juli Otoole LPN  Family/Support System Care: self-care encouraged     Problem: Bowel Elimination Impaired (Stroke, Ischemic/Transient Ischemic Attack)  Goal: Effective Bowel Elimination  Outcome: Ongoing, Progressing     Problem:  Cerebral Tissue Perfusion (Stroke, Ischemic/Transient Ischemic Attack)  Goal: Optimal Cerebral Tissue Perfusion  Outcome: Ongoing, Progressing     Problem: Cognitive Impairment (Stroke, Ischemic/Transient Ischemic Attack)  Goal: Optimal Cognitive Function  Outcome: Ongoing, Progressing     Problem: Communication Impairment (Stroke, Ischemic/Transient Ischemic Attack)  Goal: Improved Communication Skills  Outcome: Ongoing, Progressing  Intervention: Optimize Communication Skills  Recent Flowsheet Documentation  Taken 12/17/2023 0853 by Juli Otoole LPN  Communication Enhancement Strategies: call light answered in person     Problem: Functional Ability Impaired (Stroke, Ischemic/Transient Ischemic Attack)  Goal: Optimal Functional Ability  Outcome: Ongoing, Progressing     Problem: Respiratory Compromise (Stroke, Ischemic/Transient Ischemic Attack)  Goal: Effective Oxygenation and Ventilation  Outcome: Ongoing, Progressing     Problem: Sensorimotor Impairment (Stroke, Ischemic/Transient Ischemic Attack)  Goal: Improved Sensorimotor Function  Outcome: Ongoing, Progressing  Intervention: Optimize Range of Motion, Motor Control and Function  Recent Flowsheet Documentation  Taken 12/17/2023 0853 by Juli Otoole LPN  Range of Motion: active ROM (range of motion) encouraged     Problem: Swallowing Impairment (Stroke, Ischemic/Transient Ischemic Attack)  Goal: Optimal Eating and Swallowing without Aspiration  Outcome: Ongoing, Progressing     Problem: Urinary Elimination Impaired (Stroke, Ischemic/Transient Ischemic Attack)  Goal: Effective Urinary Elimination  Outcome: Ongoing, Progressing   Goal Outcome Evaluation:

## 2023-12-17 NOTE — THERAPY EVALUATION
Patient Name: Lula Burnett  : 1950    MRN: [id number]                              Today's Date: 2023       Admit Date: 2023    Visit Dx:     ICD-10-CM ICD-9-CM   1. Acute stroke due to ischemia  I63.9 434.91   2. Dizziness  R42 780.4   3. Facial numbness  R20.0 782.0     Patient Active Problem List   Diagnosis    Diabetes    Diabetic neuropathy    GERD (gastroesophageal reflux disease)    Hypertension    Hypothyroidism    Mitral stenosis    Mixed hyperlipidemia    Anxiety    Depression    Class 1 obesity in adult    Memory impairment    Paroxysmal atrial fibrillation    Left pontine stroke    UTI (urinary tract infection), bacterial    Transaminitis    Facial numbness     Past Medical History:   Diagnosis Date    Anxiety     Asthma     COVID-19     Diagnosed with positive test 22.    CVA (cerebral vascular accident)     x3    Depression     Diabetes     Diabetic neuropathy     Former smoker     GERD (gastroesophageal reflux disease)     Hypertension     Hypothyroidism     Mitral stenosis     Mitral valve annular calcification     Peptic ulceration     PONV (postoperative nausea and vomiting)     Psoriasis     Vertigo      Past Surgical History:   Procedure Laterality Date    CARDIAC CATHETERIZATION      CARDIAC CATHETERIZATION N/A 2022    Procedure: Coronary angiography;  Surgeon: Eugene Lundy MD;  Location:  [facility];  Service: Cardiovascular;  Laterality: N/A;    CARDIAC CATHETERIZATION N/A 2022    Procedure: Left heart cath;  Surgeon: Eugene Lundy MD;  Location:  [facility];  Service: Cardiovascular;  Laterality: N/A;    CARDIAC CATHETERIZATION N/A 2022    Procedure: Left ventriculography;  Surgeon: Eugene Lundy MD;  Location:  [facility];  Service: Cardiovascular;  Laterality: N/A;    CARDIAC ELECTROPHYSIOLOGY PROCEDURE N/A 2022    Procedure: LOOP INSERTION linq;  Surgeon:  Eugene Lundy MD;  Location: [facility]VASIVE LOCATION;  Service: Cardiovascular;  Laterality: N/A;    CHOLECYSTECTOMY  02/2000    HYSTERECTOMY  1980      General Information       Row Name 12/17/23 1232          OT Time and Intention    Document Type evaluation  -MS     Mode of Treatment occupational therapy  -MS       Row Name 12/17/23 1232          General Information    Patient Profile Reviewed yes  -MS     Prior Level of Function independent:;ADL's;driving;all household mobility;community mobility  -MS     Existing Precautions/Restrictions fall   hypertensive  -MS     Barriers to Rehab previous functional deficit;medically complex  -MS       Row Name 12/17/23 1232          Occupational Profile    Reason for Services/Referral (Occupational Profile) Pt is a 74 y/o F admitted to [facility] 12/16/23 with c/o facial numbness, N/V, and visual changes. Pt recently admitted to [facility] x2 weeks prior with L pontine CVA, symptoms resolved and dc home stable. CT cerebral perfusion indicates perfusion defect within R temporal lobe extending to R parietal lobe, questionable mild perfusion defect seen within paramedian R frontal and parietal lobes. MRI brain pending. PMHx significant for x3 CVA with memory loss, DM, anxiety, depression, and A. Fib. At baseline pt resides in , independent with ADLs and no AD for mobility. Pt reports baseline has slight decrease in balance and occasional dizziness.  -MS     Environmental Supports and Barriers (Occupational Profile) supportive family, lives on same property  -MS       Row Name 12/17/23 1232          Living Environment    People in Home alone  -MS       Row Name 12/17/23 1232          Stairs Within Home, Primary    Number of Stairs, Within Home, Primary four  -MS       Row Name 12/17/23 1232          Cognition    Orientation Status (Cognition) oriented x 4  -MS       Row Name 12/17/23 1232          Safety Issues, Functional Mobility    Impairments Affecting Function (Mobility)  balance  -MS               User Key  (r) = Recorded By, (t) = Taken By, (c) = Cosigned By      Initials Name Provider Type    Julieth Christianson, OT Occupational Therapist                     Mobility/ADL's       Row Name 12/17/23 1236          Bed Mobility    Bed Mobility bed mobility (all) activities  -MS     All Activities, [place] (Bed Mobility) modified independence  -MS       Row Name 12/17/23 1236          Transfers    Transfers sit-stand transfer  -MS       Row Name 12/17/23 1236          Sit-Stand Transfer    Sit-Stand [place] (Transfers) standby assist  -MS               User Key  (r) = Recorded By, (t) = Taken By, (c) = Cosigned By      Initials Name Provider Type    Julieth Christianson, OT Occupational Therapist                   Obj/Interventions       Row Name 12/17/23 1237          Sensory Assessment (Somatosensory)    Sensory Assessment (Somatosensory) UE sensation intact  -MS       Row Name 12/17/23 1237          Vision Assessment/Intervention    Visual Impairment/Limitations WFL  -MS     Vision Assessment Comment pt reports vision at baseline, chart review noted R eye unable to adduct, demo no deficits this date  -MS       Row Name 12/17/23 1237          Range of Motion Comprehensive    General Range of Motion bilateral upper extremity ROM WNL  -MS       Row Name 12/17/23 1237          Strength Comprehensive (MMT)    Comment, General Manual Muscle Testing (MMT) Assessment BUE grossly 4-/5  -MS       Row Name 12/17/23 1237          Balance    Balance Assessment sitting static balance;sitting dynamic balance;standing static balance;standing dynamic balance  -MS     Static Sitting Balance supervision  -MS     Dynamic Sitting Balance standby assist  -MS     Position, Sitting Balance unsupported;sitting edge of bed  -MS     Static Standing Balance contact guard;minimal assist  -MS     Dynamic Standing Balance minimal assist  -MS     Position/Device Used, Standing Balance --  HHA  -MS     Comment,  Balance occasional LOB  -MS               User Key  (r) = Recorded By, (t) = Taken By, (c) = Cosigned By      Initials Name Provider Type    MS Gerardo Julieth, OT Occupational Therapist                   Goals/Plan    No documentation.                  Clinical Impression       Row Name 12/17/23 1239          Pain Assessment    Pretreatment Pain Rating 0/10 - no pain  -MS     Posttreatment Pain Rating 0/10 - no pain  -MS       Row Name 12/17/23 1239          Plan of Care Review    Plan of Care Reviewed With patient  -MS     Progress no change  -MS     Outcome Evaluation Pt is a 74 y/o F admitted to [facility] 12/16/23 with c/o facial numbness, N/V, and visual changes. Pt recently admitted to [facility] x2 weeks prior with L pontine CVA, symptoms resolved and dc home stable. CT cerebral perfusion indicates perfusion defect within R temporal lobe extending to R parietal lobe, questionable mild perfusion defect seen within paramedian R frontal and parietal lobes. MRI brain pending. PMHx significant for x3 CVA with memory loss, DM, anxiety, depression, and A. Fib. At baseline pt resides in , independent with ADLs and no AD for mobility. Pt reports baseline has slight decrease in balance and occasional dizziness. This date, pt A&Ox4 on RA and in supine upon arrival. Pt completes bed mobility with mod I, comes to standing with SBA, however requires CGA-Gabriella for functional mobility with HHA, noted to have several LOB requiring increased assist. Pt demo good activity tolerance and global strength. Pt would benefit from use of RW to increase safety with functional mobility, as well as PT evaluation for balance training. Pt likely safe to dc home with family, PT evaluation pending for other recommendations. Pt does not demo deficits that warrant skilled OT intervention within acute setting. OT will complete orders at this time.  -MS       Row Name 12/17/23 1239          Therapy Assessment/Plan (OT)    Criteria for Skilled Therapeutic  Interventions Met (OT) no;does not meet criteria for skilled intervention  -MS     Therapy Frequency (OT) evaluation only  -MS       Row Name 12/17/23 1239          Therapy Plan Review/Discharge Plan (OT)    Equipment Needs Upon Discharge (OT) walker, rolling  -MS     Anticipated Discharge Disposition (OT) home with assist  -MS       Row Name 12/17/23 1239          Vital Signs    Pre Systolic BP Rehab 148  -MS     Pre Treatment Diastolic BP 89  -MS     O2 Delivery Pre Treatment room air  -MS     O2 Delivery Intra Treatment room air  -MS     O2 Delivery Post Treatment room air  -MS     Pre Patient Position Supine  -MS     Intra Patient Position Standing  -MS     Post Patient Position Supine  -MS     Recovery Time VSS  -MS       Row Name 12/17/23 1239          Positioning and Restraints    Pre-Treatment Position in bed  -MS     Post Treatment Position bed  -MS     In Bed notified nsg;supine;call light within reach;encouraged to call for assist;exit alarm on  -MS               User Key  (r) = Recorded By, (t) = Taken By, (c) = Cosigned By      Initials Name Provider Type    Julieth Christianson, OT Occupational Therapist                   Outcome Measures       Row Name 12/17/23 1251          How much help from another is currently needed...    Putting on and taking off regular lower body clothing? 4  -MS     Bathing (including washing, rinsing, and drying) 4  -MS     Toileting (which includes using toilet bed pan or urinal) 4  -MS     Putting on and taking off regular upper body clothing 4  -MS     Taking care of personal grooming (such as brushing teeth) 4  -MS     Eating meals 4  -MS     AM-PAC 6 Clicks Score (OT) 24  -MS       Row Name 12/17/23 0853          How much help from another person do you currently need...    Turning from your back to your side while in flat bed without using bedrails? 4  -EV     Moving from lying on back to sitting on the side of a flat bed without bedrails? 4  -EV     Moving to and from a  bed to a chair (including a wheelchair)? 3  -EV     Standing up from a chair using your arms (e.g., wheelchair, bedside chair)? 3  -EV     Climbing 3-5 steps with a railing? 2  -EV     To walk in hospital room? 3  -EV     AM-PAC 6 Clicks Score (PT) 19  -EV     Highest Level of Mobility Goal 6 --> Walk 10 steps or more  -EV       Row Name 12/17/23 1251          Functional Assessment    Outcome Measure Options AM-PAC 6 Clicks Daily Activity (OT)  -MS               User Key  (r) = Recorded By, (t) = Taken By, (c) = Cosigned By      Initials Name Provider Type    EV Juli Otoole LPN Licensed Nurse    MS Julieth Carrillo OT Occupational Therapist                    Occupational Therapy Education       Title: PT OT SLP Therapies (Done)       Topic: Occupational Therapy (Done)       Point: ADL training (Done)       Description:   Instruct learner(s) on proper safety adaptation and remediation techniques during self care or transfers.   Instruct in proper use of assistive devices.                  Learning Progress Summary             Patient Acceptance, E,TB, VU by MS at 12/17/2023 1251                         Point: Precautions (Done)       Description:   Instruct learner(s) on prescribed precautions during self-care and functional transfers.                  Learning Progress Summary             Patient Acceptance, E,TB, VU by MS at 12/17/2023 1251                         Point: Body mechanics (Done)       Description:   Instruct learner(s) on proper positioning and spine alignment during self-care, functional mobility activities and/or exercises.                  Learning Progress Summary             Patient Acceptance, E,TB, VU by MS at 12/17/2023 1251                                         User Key       Initials Effective Dates Name Provider Type Discipline    MS 07/13/22 -  Julieth Carrillo OT Occupational Therapist OT                  OT Recommendation and Plan  Therapy Frequency (OT): evaluation only  Plan  of Care Review  Plan of Care Reviewed With: patient  Progress: no change  Outcome Evaluation: Pt is a 74 y/o F admitted to [facility] 12/16/23 with c/o facial numbness, N/V, and visual changes. Pt recently admitted to [facility] x2 weeks prior with L pontine CVA, symptoms resolved and dc home stable. CT cerebral perfusion indicates perfusion defect within R temporal lobe extending to R parietal lobe, questionable mild perfusion defect seen within paramedian R frontal and parietal lobes. MRI brain pending. PMHx significant for x3 CVA with memory loss, DM, anxiety, depression, and A. Fib. At baseline pt resides in [place], independent with ADLs and no AD for mobility. Pt reports baseline has slight decrease in balance and occasional dizziness. This date, pt A&Ox4 on RA and in supine upon arrival. Pt completes bed mobility with mod I, comes to standing with SBA, however requires CGA-Gabriella for functional mobility with HHA, noted to have several LOB requiring increased assist. Pt demo good activity tolerance and global strength. Pt would benefit from use of RW to increase safety with functional mobility, as well as PT evaluation for balance training. Pt likely safe to dc home with family, PT evaluation pending for other recommendations. Pt does not demo deficits that warrant skilled OT intervention within acute setting. OT will complete orders at this time.     Time Calculation:                   Julieth Carrillo OT  12/17/2023

## 2023-12-17 NOTE — CONSULTS
[facility]   Teleneurology Note    Patient Name: Lula Burnett  : 1950  MRN: [id number]  Primary Care Physician: Provider, No Known  Referring Site: [facility]   Teleneurology Initial Data     Arrival Date Telestroke Site: 23     Neurologist Evaluation Date: 23 Neurologist Evaluation Time:  (8:05)   Date Last Known Well: 12/15/23 (before she goes to sleep)       History     Chief Complaint: b/l face numbness, N/V  HPI: Pt woke up dizzy and with double vision this AM, she stayed under the recliner all day, she did not take any of her meds today. she has a L pontine IS around december 3th, she take asa and brilianta, last A1c was > 11, later this eventing she catherine that her face was numb b/l, and the N/V become worse, hence the visit to the ER, she states she was compliant with her meds since the discharge from the hospital, the only dose that was missed was today's dose. she denies weakness or numbness in her extremities.    Stroke Risk Factors/ Pertinent Data           Scoring Scales     Modified Caldwell Scale  Pre-Stroke Modified Caldwell Scale: 0 - No Symptoms at all.  Intracerebral Hemmorhage (ICH) Score  Lili Coma Score: 13-15  Age>=80: no  [place] Coma Scale  Best Eye Response: Spontaneous  Best Verbal Response: Oriented  Best Motor Response: Follows commands  Lili Coma Scale Score: 15    NIH Stroke Scale     NIHSS Performed Date: 23 NIHSS Performed Time:    Interval: baseline  1a. Level of Consciousness: 0-->Alert, keenly responsive  1b. LOC Questions: 0-->Answers both questions correctly  1c. LOC Commands: 0-->Performs both tasks correctly  2. Best Gaze: 0-->Normal (R eye adduction paralysis)  3. Visual: 0-->No visual loss  4. Facial Palsy: 1-->Minor paralysis (flattened nasolabial fold, asymmetry on smiling)  5a. Motor Arm, Left: 0-->No drift, limb holds 90 (or 45) degrees for full 10 secs  5b. Motor Arm, Right: 0-->No drift, limb holds 90 (or 45) degrees  for full 10 secs  6a. Motor Leg, Left: 0-->No drift, leg holds 30 degree position for full 5 secs  6b. Motor Leg, Right: 0-->No drift, leg holds 30 degree position for full 5 secs  7. Limb Ataxia: 0-->Absent  8. Sensory: 1-->Mild-to-moderate sensory loss, patient feels pinprick is less sharp or is dull on the affected side, or there is a loss of superficial pain with pinprick, but patient is aware of being touched (bilateral face)  9. Best Language: 0-->No aphasia, normal  10. Dysarthria: 1-->Mild-to-moderate dysarthria, patient slurs at least some words and, at worst, can be understood with some difficulty  11. Extinction and Inattention (formerly Neglect): 0-->No abnormality  Total (NIH Stroke Scale): 3     Review of Systems     Review of Systems   Constitutional:  Positive for activity change and fatigue.   HENT:  Positive for trouble swallowing.    Eyes:  Negative for discharge.   Cardiovascular:  Positive for chest pain.   Gastrointestinal:  Positive for nausea.   Skin:  Negative for color change.   Neurological:  Positive for dizziness, facial asymmetry, speech difficulty, light-headedness and numbness. Negative for weakness.   Psychiatric/Behavioral:  Negative for agitation.        Objective   Exam     Exam performed with the help of support staff from the referring site  Neurological Exam    Pt awake and alert, in minor distress, nauseated, answers questions appropriately, no aphasia, Yes dysarthria, NO VF cut, L face droop is noted, R eye can not adduct, moves the upper ext with no obvious deficit, no pronator drift, no dysmetria with finger to nose b/l. able to  both legs against gravity with no signs of focal weakness, heel to shin was normal b/l    Result Review    Results          Personal review of CNS imaging:(Official report by radiologist pending)  Imaging  CT Imaging Review: CT Imaging reviewed, NO acute infarct/ hemorrhage seen  CTA Imaging Review: CTA Imaging reviewed, NO large vessel  occlusion or severe stenosis seen (waiting confirmation from rads)    Thrombolytic   Thrombolytics: thrombolytic not given  Thrombolytic Exclusion Criteria: Onset unknown or GREATER than 4.5 hours     Assessment & Plan   Assessment/ Plan     Assessment:  Acute Stroke Evaluation: Suspected ACUTE ischemic stroke       Plan:      Final Impression and Plan: brain stem AIS.not tpa candidate.no LVO that I can see, please make sure to call me if radiology read is abnormal.  Admit to telemetry floor    - NPO until cleared to swallow.   - Aspirin 325mg PO Now and daily if cleared, otherwise use 300mg rectally today asap  - resume home rosuvastatin, after being cleared by SLP, not urgent  - IV fluids, has not eaten much the whole day.  - Permissive hypertension < 220/120, until 12/18/2023 at 8 AM, then aim slowly to bring it to < 140 systolic.   - Utox, UA, do not repeat stroke labs done 2 weeks ago.   - MRI brain without contrast when able, not urgent.  - CT Angiogram Head and Neck: reviewed, please call me stat, if radiology read is abnormal.  -  DO not repeat TTE done 2 weeks ago, EF 60%, LA of normal size, BS was negative.  - continuous cardiac monitoring, document and inform neurology of any arrhythmias  - PT/OT/Speech therapy  - neurochecks Q2 for now, if change of NIHSS by 4 points or more, call neuro on call stat.  - Deep Venous Thrombosis Prophylaxis per primary team.  - long term cardiac monitoring at the discharge.      Disposition     Disposition: The patient will remain at the referring institution for further evaluation and management    Medical Decision Making  Medical Data Reviewed: Data reviewed including: clinical labs, radiology and/or medical tests, Independent review of CNS images  Length of visit: 60 minutes    Terrell GARCIA MD, saw the patient on 12/16/23 at  (8:05) for an initial in-patient or emergency room telememedicine face to face consult using interactive technology for 60 minutes. The  location of the patient was [place]. I was located at  .    I have proceeded with this evaluation at the request of the referring practitioner as it is felt to be an emergency setting and no appropriate specialist is available to perform this evaluation. The [facility] has reported that this is the correct patient and has obtained consent from the patient/surrogate to perform this telemedicine evaluation(including obtaining history, performing examination and reviewing data provided by the patient an/or originating site of care provider)    I have introduced myself to the patient, provided my credentials, disclosed my location, and determined that, based on review of the patient's chart and discussion with the patient's primary team, telemedicine via a HIPAA compliant, real-time, face-to-face two-way, interactive audio and video platform is an appropriate and effective means of providing the service.    The patient/surrogate has a right to refuse this evaluation as they have been explained risks including potential loss of confidentiality, benefits, alternatives, and the potential need for subsequent face-to-face care. In this evaluation, we will be providing recommendations only.  The ultimate decision to follow or not to follow these recommendations will be left to the bedside treating/requesting practitioner.    The patient/surrogate has been notified that other healthcare professionals including technical person may be involved in this A/V evaluation.  All laws concerning confidentiality and patient access to medical records and copies of medical records apply to telemedicine.  The patient/surrogate has received the originating [facility] Notice of Privacy Practices.    Terrell Jimenez MD

## 2023-12-17 NOTE — PLAN OF CARE
Goal Outcome Evaluation:   Pt seen for clinical swallow eval. Pt was seated upright in bed and able to feed herself. Pt reports having multiple strokes but endorses no previous swallow problems. Pt has natural upper teeth and a lower partial that she did not bring to the hospital. She was assessed with trials of ice chips, water by spoon and straw, applesauce and a Fig dietrich.  Mastication was slow but functional for sodt solid. Oral transit functional. No pocketing or oral residual of any trial occurred. Visualization of swallow suggests timely initiation/functional pharyngeal phase of swallow. After the swallow, pt had no cough or other overt s/s of aspiration.    Oral and pharyngeal swallow appear WFL.   It is rec that pt initiate a soft to chew diet with thin liquids as tolerated. Pt should be up at 90 degrees for all PO. She should eat at a slow rate and take small bites and sips.  Education provided to pt on safe swallow strategies. Pt verbalized understanding. ST will follow to assure tolerance of diet and make further recs as indicated.

## 2023-12-17 NOTE — CONSULTS
"Primary Care Provider: Provider, No Known     Consult requested by: Admitting team    Reason for Consultation: Neurological evaluation /vertigo/dizziness and questionable facial numbness, which she denied to me    History taken from: patient chart RN    Chief complaint: Dizziness       SUBJECTIVE:    History of present illness: Background per H&P: Lula Burnett is a 73 y.o. year old female who was evaluated in room 247 at [facility]    Source of information is the patient and the medical record    This patient is known to me, as I saw her a couple of weeks back  She had pontine infarct with some numbness    She is on DAPT    She presented yesterday, she reported to the other staff, numbness on the face but to me she said it was dizziness    Her CTA showed diminutive flow intracranially  CTP was questionable?    Her MRI is pending    The 1 thing we did find out was that her blood pressure at time was as high as 196 systolic and 109 diastolic    As a mention above she has been worked up recently thoroughly    She is doing great and wants to go home    NIH questionable 1.  And I am really debating what is abnormal right now    No headaches or migraines or seizures          As per admitting,  Lula Burnett is a 73 y.o. female with PMH of  PMH of hypertension, hyperlipidemia, hypothyroid, diabetes mellitus type 2, paroxysmal atrial fibrillation, and previous strokes presented to the hospital on 12/16/2023 with dizziness nausea vomiting and facial numbness     Known from previous admission 12/1/2023 to 12/4/2023 for treatment of acute left pontine stroke.  Symptoms had resolved over the course of her stay.  She was discharged home in stable condition     Patient reports was doing well.  Granddaughter at bedside reports on Wednesday, 12/13/2023 they gone shopping and out to lunch and patient was showing no sign of any abnormality.  This morning patient reports she woke up and thought \"I was having a " "vertigo attack\".  Patient currently resides in 1/5 wheel  on her son's property.  She reports she was unable to walk and had to crawl to get to the phone to call for help.  She reports was having double vision and nausea.  Was unable to lift her head.  Around 4 PM she had acute onset headache she reports her entire face tongue and mouth became numb she started vomiting bile numbness then progressed down her right arm and the balls of bilateral feet and all her toes.  She called her son and her granddaughter brought her to the ED for evaluation     A code stroke was called in the ED.  She was not a candidate for TPA.  She was seen by tele neurologist.  CT showed diminutive appearance of the anterior cerebral arteries bilaterally, right greater than left, similar compared to the previous study.  Diminutive appearance of the right PCA appears similar as compared to previous study no large vessel occlusion identified.  No additional evidence of hemodynamically significant stenosis, AVM or aneurysm.  It was recommended to follow-up with MRI.  Electrolytes within normal limits positive for hyperglycemia no leukocytosis.  Patient with residual left facial droop facial numbness and numbness of the lower feet and toes.  She reports dizziness is improved.  She has been admitted for further observation     Patient was seen and examined on 12/16/23 at 23:52 EST      - Portions of the above HPI were copied from previous encounters and edited as appropriate. PMH as detailed below.     Review of Systems   No fever chills rigors or sweats  No weight issues  No sleep problems  HEENT:  No speech problem, vision changes, facial asymmetry or pain, or neck problem  Chest: No chest pain, clubbing, cyanosis, orthopnea palpitations  Pulmonary:  No shortness of air, cough or expectoration  Abdomen:  No swelling/tension, constipation,diarrhea or pain  No genitourinary symptoms  Extremity problems as discussed  No back problem  No psychotic " issues  Neurologic issues as discussed  No hematologic, dermatologic or endocrine problems        PATIENT HISTORY:  Past Medical History:   Diagnosis Date    Anxiety     Asthma     COVID-19     Diagnosed with positive test 22.    CVA (cerebral vascular accident)     x3    Depression     Diabetes     Diabetic neuropathy     Former smoker     GERD (gastroesophageal reflux disease)     Hypertension     Hypothyroidism     Mitral stenosis     Mitral valve annular calcification     Peptic ulceration     PONV (postoperative nausea and vomiting)     Psoriasis     Vertigo    ,   Past Surgical History:   Procedure Laterality Date    CARDIAC CATHETERIZATION      CARDIAC CATHETERIZATION N/A 2022    Procedure: Coronary angiography;  Surgeon: Eugene Lundy MD;  Location:  [facility];  Service: Cardiovascular;  Laterality: N/A;    CARDIAC CATHETERIZATION N/A 2022    Procedure: Left heart cath;  Surgeon: Eugene Lundy MD;  Location:  [facility];  Service: Cardiovascular;  Laterality: N/A;    CARDIAC CATHETERIZATION N/A 2022    Procedure: Left ventriculography;  Surgeon: Eugene Lundy MD;  Location:  [facility];  Service: Cardiovascular;  Laterality: N/A;    CARDIAC ELECTROPHYSIOLOGY PROCEDURE N/A 2022    Procedure: LOOP INSERTION linq;  Surgeon: Eugene Lundy MD;  Location:  [facility];  Service: Cardiovascular;  Laterality: N/A;    CHOLECYSTECTOMY  2000    HYSTERECTOMY     ,   Family History   Problem Relation Age of Onset    Heart failure Mother     Lung cancer Father     Stroke Sister     Breast cancer Neg Hx     Ovarian cancer Neg Hx    ,   Social History     Tobacco Use    Smoking status: Former     Packs/day: 1.00     Years: 20.00     Additional pack years: 0.00     Total pack years: 20.00     Types: Cigarettes     Start date:      Quit date: 2011     Years since quittin.8     Smokeless tobacco: Never    Tobacco comments:     Quit around 1990   Vaping Use    Vaping Use: Never used   Substance Use Topics    Alcohol use: Never    Drug use: Never   ,   Prior to Admission medications    Medication Sig Start Date End Date Taking? Authorizing Provider   Adalimumab (Humira Pen) 40 MG/0.8ML Pen-injector Kit Inject 40 mg under the skin into the appropriate area as directed Every 14 (Fourteen) Days. 1/24/23  Yes Val Irvin MD   ALPRAZolam (XANAX) 0.25 MG tablet Take 0.5 tablets by mouth 2 (Two) Times a Day. 1/24/23  Yes Val Irvin MD   aspirin 81 MG chewable tablet Chew 1 tablet Daily. 12/5/23  Yes Hao Christiansen MD   carvedilol (COREG) 6.25 MG tablet Take 1 tablet by mouth 2 (Two) Times a Day With Meals. 11/20/23  Yes Val Irvin MD   cetirizine (zyrTEC) 10 MG tablet TAKE 1 TABLET DAILY 3/28/23  Yes Val Irvin MD   cyclobenzaprine (FLEXERIL) 10 MG tablet Take 1 tablet by mouth 3 (Three) Times a Day As Needed for Muscle Spasms. 1/24/23  Yes Val Irvin MD   digoxin (LANOXIN) 250 MCG tablet Take 1 tablet by mouth Daily. 1 tablet daily 12/4/23  Yes Hao Christiansen MD   estrogens, conjugated, (PREMARIN) 1.25 MG tablet Take 1 tablet by mouth Daily. 6/27/23  Yes Val Irvin MD   Fluticasone-Salmeterol (ADVAIR/WIXELA) 250-50 MCG/ACT DISKUS Inhale 1 puff 2 (Two) Times a Day. 1/24/23  Yes Val Irvin MD   Insulin Glargine (Lantus SoloStar) 100 UNIT/ML injection pen Inject 30 Units under the skin into the appropriate area as directed Every Night. 12/4/23  Yes Hao Christiansen MD   Insulin Lispro, 1 Unit Dial, (HumaLOG KwikPen) 100 UNIT/ML solution pen-injector Inject 10 Units under the skin into the appropriate area as directed Daily With Breakfast, Lunch & Dinner. 12/4/23  Yes Hao Christiansen MD   Insulin Lispro, 1 Unit Dial, (HumaLOG KwikPen) 100 UNIT/ML solution pen-injector Inject 10 Units under the skin  into the appropriate area as directed 3 (Three) Times a Day Before Meals. 12/5/23  Yes Val Irvin MD   levothyroxine (SYNTHROID, LEVOTHROID) 50 MCG tablet TAKE 1 TABLET BY MOUTH DAILY 7/24/23  Yes Val Irvin MD   meclizine (ANTIVERT) 25 MG tablet TAKE 1 TABLET THREE TIMES A DAY AS NEEDED FOR DIZZINESS 9/22/23  Yes Val Irvin MD   montelukast (SINGULAIR) 10 MG tablet Take 1 tablet by mouth Every Night. 1/24/23  Yes Val Irvin MD   Multiple Vitamins-Minerals (CENTRUM FRESH/FRUITY 50+ PO) Take  by mouth.   Yes Rey Tucker MD   ondansetron ODT (ZOFRAN-ODT) 8 MG disintegrating tablet PLACE 1 TABLET ON THE TONGUE EVERY 8 HOURS AS NEEDED FOR NAUSEA OR VOMITING 2/10/23  Yes Val Irvin MD   pantoprazole (PROTONIX) 40 MG EC tablet Take 1 tablet by mouth Daily. 1/24/23  Yes Val Irvin MD   PARoxetine (PAXIL) 20 MG tablet Take 1 tablet by mouth Daily. 1/24/23  Yes Val Irvin MD   potassium chloride (K-DUR,KLOR-CON) 20 MEQ CR tablet TAKE 1 TABLET DAILY 10/3/23  Yes Val Irvin MD   ProAir  (90 Base) MCG/ACT inhaler Inhale 2 puffs Every 4 (Four) Hours As Needed for Wheezing or Shortness of Air. 1/24/23  Yes Val Irvin MD   rosuvastatin (CRESTOR) 20 MG tablet TAKE 1 TABLET DAILY 7/5/23  Yes Val Irvin MD   Synjardy XR  MG tablet sustained-release 24 hour Take 1 tablet by mouth Daily. 1/24/23  Yes Val Irvin MD   ticagrelor (BRILINTA) 90 MG tablet tablet Take 1 tablet by mouth 2 (Two) Times a Day. 12/4/23  Yes Hao Christiansen MD   topiramate (TOPAMAX) 25 MG tablet Take 1 tablet by mouth 2 (Two) Times a Day. 1/24/23  Yes Val Irvin MD   triamterene-hydrochlorothiazide (MAXZIDE) 75-50 MG per tablet TAKE 1 TABLET DAILY 4/7/23  Yes Val Irvin MD   Turmeric 500 MG capsule Take  by mouth.   Yes Provider, MD Rey    Insulin Pen Needle (B-D UF III MINI PEN NEEDLES) 31G X 5 MM misc 1 Piece Daily. 4/10/23   Val Irvin MD   Insulin Pen Needle (Pen Needles) 32G X 4 MM misc Use 1 each 4 (Four) Times a Day Before Meals & at Bedtime. 12/4/23   Hao Christiansen MD   nitroglycerin (NITROSTAT) 0.4 MG SL tablet 1 under the tongue as needed for angina, may repeat q5mins for up three doses 1/28/22   Gumaro Hancock MD    Allergies:  Codeine    Current Facility-Administered Medications   Medication Dose Route Frequency Provider Last Rate Last Admin    dextrose (D50W) (25 g/50 mL) IV injection 10-50 mL  10-50 mL Intravenous Q15 Min PRN Lay Raza APRN        dextrose (GLUTOSE) oral gel 15 g  15 g Oral Q15 Min PRN Lay Raza APRN        Glucagon (GLUCAGEN) injection 1 mg  1 mg Intramuscular Q15 Min PRN Lay Raza APRN        hydrALAZINE (APRESOLINE) injection 20 mg  20 mg Intravenous Q6H PRN Galo Dunbar MD        insulin glargine (LANTUS, SEMGLEE) injection 1-200 Units  1-200 Units Subcutaneous Nightly - Glucommander Lay Raza APRN   19 Units at 12/17/23 0049    insulin lispro (HUMALOG/ADMELOG) injection 1-200 Units  1-200 Units Subcutaneous 4x Daily With Meals & Nightly Lay Raza APRN   1 Units at 12/17/23 0850    insulin lispro (HUMALOG/ADMELOG) injection 1-200 Units  1-200 Units Subcutaneous PRN Lay Raza APRN        ondansetron (ZOFRAN) injection 4 mg  4 mg Intravenous Q6H PRN Lay Raza APRN   4 mg at 12/17/23 0206    sodium chloride 0.9 % flush 10 mL  10 mL Intravenous PRN Peggy Larkin APRN        sodium chloride 0.9 % flush 10 mL  10 mL Intravenous Q12H Lay Raza APRN   10 mL at 12/17/23 0847    sodium chloride 0.9 % flush 10 mL  10 mL Intravenous PRN Lay Raza APRN        sodium chloride 0.9 % infusion 40 mL  40 mL Intravenous PRN Lay Raza APRN            ________________________________________________________         OBJECTIVE:  Upon today's exam, the patient is resting comfortably in bed in no acute distress     Neurologic Exam    The patient is awake and alert and oriented x3     Cranial nerve examination demonstrate:  Full fields of vision to confrontation  Pupils are round, reactive to light and accommodation and size of about 3 mm  No ptosis or nystagmus  Funduscopic examination was not successful  Eye movements are conjugate     Sensation on the face and scalp are normal  Muscles of mastication are normal and symmetric  Muscles of  facial expression are normal and symmetric  Hearing is intact bilaterally  Head turning and shoulder shrugs were unremarkable  Tongue was midline  I could not visualize her oropharynx or uvula     Motor examination:  Normal bulk, tone and strength was 5-/5  No fasciculations     Sensory examination:  Intact for soft touch, pain and position sensation  Romberg was not evaluated     Reflexes:  0/4     Coordination:  Normal finger-to-nose to finger, rapid alternating movements and toe to finger     Gait:  Deferred     Toe signs:  Mute    ________________________________________________________   RESULTS REVIEW:    VITAL SIGNS:   Temp:  [97.5 °F (36.4 °C)-98.6 °F (37 °C)] 98.6 °F (37 °C)  Heart Rate:  [79-94] 87  Resp:  [15-31] 15  BP: (134-196)/() 178/79     LABS:      Lab 12/16/23 1936   WBC 8.90   HEMOGLOBIN 15.0   HEMATOCRIT 45.5   PLATELETS 376   NEUTROS ABS 6.30   LYMPHS ABS 2.40   MONOS ABS 0.20   EOS ABS 0.00   MCV 87.4   PROTIME 10.3   APTT 26.1*         Lab 12/16/23 1936   SODIUM 139   POTASSIUM 3.6   CHLORIDE 97*   CO2 21.0*   ANION GAP 21.0*   BUN 29*   CREATININE 1.28*   EGFR 43.8*   GLUCOSE 224*   CALCIUM 10.4         Lab 12/16/23 1936   TOTAL PROTEIN 8.0   ALBUMIN 4.6   GLOBULIN 3.4   ALT (SGPT) 89*   AST (SGOT) 58*   BILIRUBIN 0.5   ALK PHOS 99         Lab 12/16/23 1936   PROTIME 10.3   INR 0.94                 UA          12/1/2023    21:11   Urinalysis   Squamous  Epithelial Cells, UA 3-6    Specific Gravity, UA 1.021    Ketones, UA Negative    Blood, UA Small (1+)    Leukocytes, UA Moderate (2+)    Nitrite, UA Negative    RBC, UA 0-2    WBC, UA Too Numerous to Count    Bacteria, UA 1+        Lab Results   Component Value Date    TSH 4.300 (H) 12/02/2023     (H) 12/02/2023    HGBA1C 12.20 (H) 12/02/2023    EMVTLLBF61 1,010 (H) 12/02/2023       IMAGING STUDIES:  CT Angiogram Neck    Result Date: 12/16/2023  Impression: Diminutive appearance of the anterior cerebral arteries bilaterally, right greater than left, similar as compared to the previous study. Diminutive appearance of the right PCA which appears similar as compared to the previous study. No large vessel occlusion identified. No additional evidence of hemodynamically significant stenosis, AVM or aneurysm within the head or neck by NASCET criteria. Electronically Signed: Carly Meredith MD  12/16/2023 8:26 PM EST  Workstation ID: [id number]    CT Angiogram Head w AI Analysis of LVO    Result Date: 12/16/2023  Impression: Diminutive appearance of the anterior cerebral arteries bilaterally, right greater than left, similar as compared to the previous study. Diminutive appearance of the right PCA which appears similar as compared to the previous study. No large vessel occlusion identified. No additional evidence of hemodynamically significant stenosis, AVM or aneurysm within the head or neck by NASCET criteria. Electronically Signed: Carly Meredith MD  12/16/2023 8:26 PM EST  Workstation ID: [id number]    CT CEREBRAL PERFUSION WITH & WITHOUT CONTRAST    Addendum Date: 12/16/2023    ADDENDUM #1 The perfusion abnormality seen within the right parietal and temporal lobes is related to the recent infarct as seen on MRI 12/1/2023. Acute on chronic infarct cannot be excluded particularly within the paramedian right frontal and parietal lobes which may be related to underlying anterior cerebral artery irregularity and diminutive  appearance which was present on CT angiogram 12/2/2023. Again MRI evaluation may be warranted to evaluate for new areas of infarction. Electronically Signed: Carly Meredith MD  12/16/2023 8:18 PM EST  Workstation ID: [id number] ORIGINAL REPORT: CT CEREBRAL PERFUSION W WO CONTRAST Date of Exam: 12/16/2023 8:00 PM EST Indication: Neuro deficit, acute, stroke suspected.  Comparison: None available. Technique: Axial CT images of the brain were obtained prior to and after the administration of iodinated contrast. CT Perfusion protocol was utilized. Automated post processing was performed by RAPID software and submitted to PACS for interpretation. Automated exposure control and iterative reconstruction was utilized. Findings: The cerebral blood flow less than 30% is 19 mL. Tmax greater than 6 seconds is 42 mL. Mismatch volume is 23 mL. Mismatch ratio is 2.2. Perfusion defect present within the right temporal lobe extending to the right parietal lobe. Questionable mild perfusion defect seen within the paramedian right frontal and parietal lobes. Impression: 1.Perfusion defect within the right temporal lobe extending to the right parietal lobe with a mismatch volume of 23 mL. Mismatch ratio is 2.2. Given the degree of hypodensity present, this is age-indeterminate. 2.Questionable mild perfusion defect seen within the paramedian right frontal and parietal lobes. MRI evaluation may be warranted. Electronically Signed: Carly Meredith MD  12/16/2023 8:14 PM EST  Workstation ID: [id number]    Result Date: 12/16/2023  Impression: 1.Perfusion defect within the right temporal lobe extending to the right parietal lobe with a mismatch volume of 23 mL. Mismatch ratio is 2.2. Given the degree of hypodensity present, this is age-indeterminate. 2.Questionable mild perfusion defect seen within the paramedian right frontal and parietal lobes. MRI evaluation may be warranted. Electronically Signed: Carly Meredith MD  12/16/2023 8:14 PM EST   Workstation ID: [id number]    XR Chest 1 View    Result Date: 12/16/2023  Impression: No acute cardiopulmonary process. Electronically Signed: Carly Meredith MD  12/16/2023 8:08 PM EST  Workstation ID: [id number]    CT Head Without Contrast Stroke Protocol    Result Date: 12/16/2023  Impression: No acute intracranial process identified. Electronically Signed: Dashawn Todd MD  12/16/2023 6:57 PM CST  Workstation ID: [id number]     I reviewed the patient's new clinical results.    ________________________________________________________     PROBLEM LIST:    Facial numbness            ASSESSMENT/PLAN:  Patient presented with with very atypical symptoms    She did have some facial and left-sided numbness recently    Will wait for MRI and if its okay I will observe her she was thoroughly evaluated        Her PFA Plavix was abnormal so she has been switched to Brilinta    Depending on MRI we will decide future course of action, if its okay she may be discharged, if it is abnormal then we will evaluate and treat as indicated    Modification of stroke risk factors:   - Blood pressure should be less than 130/80 outpatient, HbA1c less than 6.5, LDL less than 70; b12>500 and smoking cessation if applicable. We would be grateful if the primary team / primary care physician would keep a close watch on the above targets.  - Stroke education  - Follow up with neurologist of choice      I discussed the patient's findings and my recommendations with patient and nursing staff    Gorge Baird MD  12/17/23  11:15 EST

## 2023-12-17 NOTE — H&P
[facility] Medicine Services    Hospitalist History and Physical     Lula Burnett : 1950 MRN:[id number] LOS:0 ROOM: [facility]     Reason for admission: Facial numbness     Assessment / Plan     Dizziness, nausea vomiting vision changes facial numbness left upper extremity numbness and bilateral feet numbness  -History of previous CVA  -History of vertigo  -Rule out for vertigo versus anxiety versus recurrent CVA  -Symptoms improving but with residual facial numbness and positional vertigo  -Code stroke called in ED and patient not a candidate for tPA  -Follow-up MRI in the morning  -CT report perfusion per radiology:1.Perfusion defect within the right temporal lobe extending to the right parietal lobe with a mismatch volume of 23 mL. Mismatch ratio is 2.2. Given the degree of hypodensity present, this is age-indeterminate.  2.Questionable mild perfusion defect seen within the paramedian right frontal and parietal lobes. MRI evaluation may be warranted.  CTA radiology:Diminutive appearance of the anterior cerebral arteries bilaterally, right greater than left, similar as compared to the previous study. Diminutive appearance of the right PCA which appears similar as compared to the previous study. No large vessel   occlusion identified. No additional evidence of hemodynamically significant stenosis, AVM or aneurysm within the head or neck by NASCET criteria.  -Aspirin given  -Neurology consult  -Patient failed bedside swallow and is n.p.o.  -Resume statin and Brilinta when able to take p.o.    Diabetes type 2  -Order Glucomander as patient n.p.o.  -A1c on 2020 2312.2  -Insulin was adjusted on previous admission and patient reports blood sugars have been more controlled at home  -To continue current regimen at discharge    Hypertension  -Will hold on oral medications at this time while patient n.p.o.  -Per neurology allow permissive hypertension with systolic less than 220/120 until 1218 at 8  "AM then can slowly bring blood pressure down to less than 140 systolic    Hypothyroidism  -Continue Synthroid when able to take p.o.    History of anxiety  -Patient is on Xanax.  -Will order as needed medication if needed    History of paroxysmal A-fib  -Patient on digoxin  -Digoxin level is trending down.  2.2 on 12/1/2023 and now 1.5      Code Status (Patient has no pulse and is not breathing): CPR (Attempt to Resuscitate)  Medical Interventions (Patient has pulse or is breathing): Full Support       Nutrition:   NPO Diet NPO Type: Strict NPO     DVT prophylaxis:  Mechanical DVT prophylaxis orders are present.     History of Present illness     Lula Burnett is a 73 y.o. female with PMH of  PMH of hypertension, hyperlipidemia, hypothyroid, diabetes mellitus type 2, paroxysmal atrial fibrillation, and previous strokes presented to the hospital on 12/16/2023 with dizziness nausea vomiting and facial numbness    Known from previous admission 12/1/2023 to 12/4/2023 for treatment of acute left pontine stroke.  Symptoms had resolved over the course of her stay.  She was discharged home in stable condition    Patient reports was doing well.  Granddaughter at bedside reports on Wednesday, 12/13/2023 they gone shopping and out to lunch and patient was showing no sign of any abnormality.  This morning patient reports she woke up and thought \"I was having a vertigo attack\".  Patient currently resides in 1/5 wheel  on her son's property.  She reports she was unable to walk and had to crawl to get to the phone to call for help.  She reports was having double vision and nausea.  Was unable to lift her head.  Around 4 PM she had acute onset headache she reports her entire face tongue and mouth became numb she started vomiting bile numbness then progressed down her right arm and the balls of bilateral feet and all her toes.  She called her son and her granddaughter brought her to the ED for evaluation    A code stroke was " called in the ED.  She was not a candidate for TPA.  She was seen by tele neurologist.  CT showed diminutive appearance of the anterior cerebral arteries bilaterally, right greater than left, similar compared to the previous study.  Diminutive appearance of the right PCA appears similar as compared to previous study no large vessel occlusion identified.  No additional evidence of hemodynamically significant stenosis, AVM or aneurysm.  It was recommended to follow-up with MRI.  Electrolytes within normal limits positive for hyperglycemia no leukocytosis.  Patient with residual left facial droop facial numbness and numbness of the lower feet and toes.  She reports dizziness is improved.  She has been admitted for further observation    Patient was seen and examined on 12/16/23 at 23:52 EST .    Subjective / Review of systems     Review of Systems   Eyes:  Positive for visual disturbance.   Respiratory:  Positive for shortness of breath.    Gastrointestinal:  Positive for nausea and vomiting.   Neurological:  Positive for dizziness and numbness (face, tongue, aleida feet and toes).   Psychiatric/Behavioral:  The patient is nervous/anxious.    All other systems reviewed and are negative.         Past Medical/Surgical/Social/Family History & Allergies     Past Medical History:   Diagnosis Date    Anxiety     Asthma     COVID-19     Diagnosed with positive test 8/4/22.    CVA (cerebral vascular accident)     x3    Depression     Diabetes     Diabetic neuropathy     Former smoker     GERD (gastroesophageal reflux disease)     Hypertension     Hypothyroidism     Mitral stenosis     Mitral valve annular calcification     Peptic ulceration     PONV (postoperative nausea and vomiting)     Psoriasis     Vertigo       Past Surgical History:   Procedure Laterality Date    CARDIAC CATHETERIZATION      CARDIAC CATHETERIZATION N/A 02/07/2022    Procedure: Coronary angiography;  Surgeon: Eugene Lundy MD;  Location: [facility]  INVASIVE LOCATION;  Service: Cardiovascular;  Laterality: N/A;    CARDIAC CATHETERIZATION N/A 2022    Procedure: Left heart cath;  Surgeon: Eugene Lundy MD;  Location:  [facility] CATH INVASIVE LOCATION;  Service: Cardiovascular;  Laterality: N/A;    CARDIAC CATHETERIZATION N/A 2022    Procedure: Left ventriculography;  Surgeon: Eugene Lundy MD;  Location:  [facility] CATH INVASIVE LOCATION;  Service: Cardiovascular;  Laterality: N/A;    CARDIAC ELECTROPHYSIOLOGY PROCEDURE N/A 2022    Procedure: LOOP INSERTION linq;  Surgeon: Eugene Lundy MD;  Location:  [facility] CATH INVASIVE LOCATION;  Service: Cardiovascular;  Laterality: N/A;    CHOLECYSTECTOMY  2000    HYSTERECTOMY        Social History     Socioeconomic History    Marital status:    Tobacco Use    Smoking status: Former     Packs/day: 1.00     Years: 20.00     Additional pack years: 0.00     Total pack years: 20.00     Types: Cigarettes     Start date:      Quit date: 2011     Years since quittin.8    Smokeless tobacco: Never    Tobacco comments:     Quit around    Vaping Use    Vaping Use: Never used   Substance and Sexual Activity    Alcohol use: Never    Drug use: Never    Sexual activity: Not Currently      Family History   Problem Relation Age of Onset    Heart failure Mother     Lung cancer Father     Stroke Sister     Breast cancer Neg Hx     Ovarian cancer Neg Hx       Allergies   Allergen Reactions    Codeine GI Intolerance      Social Determinants of Health     Tobacco Use: Medium Risk (2023)    Patient History     Smoking Tobacco Use: Former     Smokeless Tobacco Use: Never     Passive Exposure: Not on file   Alcohol Use: Not At Risk (2023)    AUDIT-C     Frequency of Alcohol Consumption: Never     Average Number of Drinks: Patient does not drink     Frequency of Binge Drinking: Never   Financial Resource Strain: Not on file   Food Insecurity: Not on file   Transportation Needs:  Not on file   Physical Activity: Not on file   Stress: Not on file   Social Connections: Unknown (10/13/2023)    Family and Community Support     Help with Day-to-Day Activities: Not on file     Lonely or Isolated: Not on file   Interpersonal Safety: Not At Risk (12/16/2023)    Abuse Screen     Unsafe at Home or Work/School: no     Feels Threatened by Someone?: no     Does Anyone Keep You from Contacting Others or Doint Things Outside the Home?: no     Physical Sign of Abuse Present: no   Depression: Not at risk (12/16/2023)    PHQ-2     PHQ-2 Score: 1   Housing Stability: Unknown (12/16/2023)    Housing Stability     Current Living Arrangements: other (see comments)     Potentially Unsafe Housing Conditions: Not on file   Utilities: Not on file   Health Literacy: Unknown (10/13/2023)    Education     Help with school or training?: Not on file     Preferred Language: Not on file   Employment: Unknown (10/13/2023)    Employment     Do you want help finding or keeping work or a job?: Not on file   Disabilities: At Risk (12/16/2023)    Disabilities     Concentrating, Remembering, or Making Decisions Difficulty: no     Doing Errands Independently Difficulty: yes        Home Medications     Prior to Admission medications    Medication Sig Start Date End Date Taking? Authorizing Provider   Adalimumab (Humira Pen) 40 MG/0.8ML Pen-injector Kit Inject 40 mg under the skin into the appropriate area as directed Every 14 (Fourteen) Days. 1/24/23  Yes Val Irvin MD   ALPRAZolam (XANAX) 0.25 MG tablet Take 0.5 tablets by mouth 2 (Two) Times a Day. 1/24/23  Yes Val Irvin MD   aspirin 81 MG chewable tablet Chew 1 tablet Daily. 12/5/23  Yes Hao Christiansen MD   carvedilol (COREG) 6.25 MG tablet Take 1 tablet by mouth 2 (Two) Times a Day With Meals. 11/20/23  Yes Val Irvin MD   cetirizine (zyrTEC) 10 MG tablet TAKE 1 TABLET DAILY 3/28/23  Yes Val Irvin MD   cyclobenzaprine  (FLEXERIL) 10 MG tablet Take 1 tablet by mouth 3 (Three) Times a Day As Needed for Muscle Spasms. 1/24/23  Yes Val Irvin MD   digoxin (LANOXIN) 250 MCG tablet Take 1 tablet by mouth Daily. 1 tablet daily 12/4/23  Yes Hao Christiansen MD   estrogens, conjugated, (PREMARIN) 1.25 MG tablet Take 1 tablet by mouth Daily. 6/27/23  Yes Val Irvin MD   Fluticasone-Salmeterol (ADVAIR/WIXELA) 250-50 MCG/ACT DISKUS Inhale 1 puff 2 (Two) Times a Day. 1/24/23  Yes Val Irvin MD   Insulin Glargine (Lantus SoloStar) 100 UNIT/ML injection pen Inject 30 Units under the skin into the appropriate area as directed Every Night. 12/4/23  Yes Hao Christiansen MD   Insulin Lispro, 1 Unit Dial, (HumaLOG KwikPen) 100 UNIT/ML solution pen-injector Inject 10 Units under the skin into the appropriate area as directed Daily With Breakfast, Lunch & Dinner. 12/4/23  Yes Hao Christiansen MD   Insulin Lispro, 1 Unit Dial, (HumaLOG KwikPen) 100 UNIT/ML solution pen-injector Inject 10 Units under the skin into the appropriate area as directed 3 (Three) Times a Day Before Meals. 12/5/23  Yes Val Irvin MD   levothyroxine (SYNTHROID, LEVOTHROID) 50 MCG tablet TAKE 1 TABLET BY MOUTH DAILY 7/24/23  Yes Val Irvin MD   meclizine (ANTIVERT) 25 MG tablet TAKE 1 TABLET THREE TIMES A DAY AS NEEDED FOR DIZZINESS 9/22/23  Yes Val Irvin MD   montelukast (SINGULAIR) 10 MG tablet Take 1 tablet by mouth Every Night. 1/24/23  Yes Val Irvin MD   Multiple Vitamins-Minerals (CENTRUM FRESH/FRUITY 50+ PO) Take  by mouth.   Yes ProviderRey MD   ondansetron ODT (ZOFRAN-ODT) 8 MG disintegrating tablet PLACE 1 TABLET ON THE TONGUE EVERY 8 HOURS AS NEEDED FOR NAUSEA OR VOMITING 2/10/23  Yes Val Irvin MD   pantoprazole (PROTONIX) 40 MG EC tablet Take 1 tablet by mouth Daily. 1/24/23  Yes Val Irvin MD   PARoxetine (PAXIL) 20 MG tablet  Take 1 tablet by mouth Daily. 1/24/23  Yes Val Irvin MD   potassium chloride (K-DUR,KLOR-CON) 20 MEQ CR tablet TAKE 1 TABLET DAILY 10/3/23  Yes Val Irvin MD   ProAir  (90 Base) MCG/ACT inhaler Inhale 2 puffs Every 4 (Four) Hours As Needed for Wheezing or Shortness of Air. 1/24/23  Yes Val Irvin MD   rosuvastatin (CRESTOR) 20 MG tablet TAKE 1 TABLET DAILY 7/5/23  Yes Val Irvin MD   Synjardy XR  MG tablet sustained-release 24 hour Take 1 tablet by mouth Daily. 1/24/23  Yes Val Irvin MD   ticagrelor (BRILINTA) 90 MG tablet tablet Take 1 tablet by mouth 2 (Two) Times a Day. 12/4/23  Yes Hao Christiansen MD   topiramate (TOPAMAX) 25 MG tablet Take 1 tablet by mouth 2 (Two) Times a Day. 1/24/23  Yes Val Irvin MD   triamterene-hydrochlorothiazide (MAXZIDE) 75-50 MG per tablet TAKE 1 TABLET DAILY 4/7/23  Yes Val Irvin MD   Turmeric 500 MG capsule Take  by mouth.   Yes Rey Tucker MD   Insulin Pen Needle (B-D UF III MINI PEN NEEDLES) 31G X 5 MM misc 1 Piece Daily. 4/10/23   Val Irvin MD   Insulin Pen Needle (Pen Needles) 32G X 4 MM misc Use 1 each 4 (Four) Times a Day Before Meals & at Bedtime. 12/4/23   Hao Christiansen MD   nitroglycerin (NITROSTAT) 0.4 MG SL tablet 1 under the tongue as needed for angina, may repeat q5mins for up three doses 1/28/22   Gumaro Hancock MD        Objective / Physical Exam     Vital signs:  Temp: 97.5 °F (36.4 °C)  BP: 168/78  Heart Rate: 86  Resp: 16  SpO2: 100 %  Weight: 73.9 kg (163 lb)    Admission Weight: Weight: 73.9 kg (163 lb)    Physical Exam  Constitutional:       Appearance: She is ill-appearing.      Comments: Emesis bag in hand   HENT:      Mouth/Throat:      Comments: Left eyelid and left facial droop  Eyes:      Pupils: Pupils are equal, round, and reactive to light.   Cardiovascular:      Rate and Rhythm: Normal rate and  regular rhythm.   Pulmonary:      Effort: Pulmonary effort is normal.      Breath sounds: Normal breath sounds.   Abdominal:      General: Abdomen is flat.      Palpations: Abdomen is soft.   Musculoskeletal:         General: Normal range of motion.   Skin:     General: Skin is warm and dry.   Neurological:      Mental Status: She is alert and oriented to person, place, and time.   Psychiatric:         Mood and Affect: Mood normal.         Behavior: Behavior normal.          Labs     Results from last 7 days   Lab Units 12/16/23 1936   WBC 10*3/mm3 8.90   HEMOGLOBIN g/dL 15.0   HEMATOCRIT % 45.5   PLATELETS 10*3/mm3 376      Results from last 7 days   Lab Units 12/16/23 1936   ALK PHOS U/L 99   AST (SGOT) U/L 58*   ALT (SGPT) U/L 89*      Results from last 7 days   Lab Units 12/16/23 1936   PROTIME Seconds 10.3   INR  0.94   APTT seconds 26.1*      Results from last 7 days   Lab Units 12/16/23 1936   SODIUM mmol/L 139   POTASSIUM mmol/L 3.6   CHLORIDE mmol/L 97*   CO2 mmol/L 21.0*   BUN mg/dL 29*   CREATININE mg/dL 1.28*   GLUCOSE mg/dL 224*        Imaging     CT Angiogram Neck    Result Date: 12/16/2023  CT ANGIOGRAM HEAD W AI ANALYSIS OF LVO, CT ANGIOGRAM NECK Date of Exam: 12/16/2023 8:00 PM EST Indication: headache. Comparison: 12/16/2023, 12/1/2023. Technique: CTA of the head and neck was performed after the uneventful intravenous administration of iodinated contrast. Reconstructed coronal and sagittal images were also obtained. In addition, a 3-D volume rendered image was created for interpretation. Automated exposure control and iterative reconstruction methods were used. Findings: Diminutive appearance of the anterior cerebral arteries bilaterally, right greater than left, similar as compared to the previous study. Diminutive appearance of the right PCA which appears similar. No large vessel occlusion identified. No additional evidence of hemodynamically significant stenosis, AVM or aneurysm within the  head or neck by NASCET criteria. No large vessel occlusion identified. No evidence of dissection. No focal soft tissue abnormality is identified. No evidence of adenopathy. Lung  apices appear clear. No acute osseous abnormality identified. No focal soft tissue abnormality.     Impression: Diminutive appearance of the anterior cerebral arteries bilaterally, right greater than left, similar as compared to the previous study. Diminutive appearance of the right PCA which appears similar as compared to the previous study. No large vessel occlusion identified. No additional evidence of hemodynamically significant stenosis, AVM or aneurysm within the head or neck by NASCET criteria. Electronically Signed: Carly Meredith MD  12/16/2023 8:26 PM EST  Workstation ID: [id number]    CT Angiogram Head w AI Analysis of LVO    Result Date: 12/16/2023  CT ANGIOGRAM HEAD W AI ANALYSIS OF LVO, CT ANGIOGRAM NECK Date of Exam: 12/16/2023 8:00 PM EST Indication: headache. Comparison: 12/16/2023, 12/1/2023. Technique: CTA of the head and neck was performed after the uneventful intravenous administration of iodinated contrast. Reconstructed coronal and sagittal images were also obtained. In addition, a 3-D volume rendered image was created for interpretation. Automated exposure control and iterative reconstruction methods were used. Findings: Diminutive appearance of the anterior cerebral arteries bilaterally, right greater than left, similar as compared to the previous study. Diminutive appearance of the right PCA which appears similar. No large vessel occlusion identified. No additional evidence of hemodynamically significant stenosis, AVM or aneurysm within the head or neck by NASCET criteria. No large vessel occlusion identified. No evidence of dissection. No focal soft tissue abnormality is identified. No evidence of adenopathy. Lung  apices appear clear. No acute osseous abnormality identified. No focal soft tissue abnormality.      Impression: Diminutive appearance of the anterior cerebral arteries bilaterally, right greater than left, similar as compared to the previous study. Diminutive appearance of the right PCA which appears similar as compared to the previous study. No large vessel occlusion identified. No additional evidence of hemodynamically significant stenosis, AVM or aneurysm within the head or neck by NASCET criteria. Electronically Signed: Carly Meredith MD  12/16/2023 8:26 PM EST  Workstation ID: [id number]    CT CEREBRAL PERFUSION WITH & WITHOUT CONTRAST    Addendum Date: 12/16/2023    ADDENDUM #1 The perfusion abnormality seen within the right parietal and temporal lobes is related to the recent infarct as seen on MRI 12/1/2023. Acute on chronic infarct cannot be excluded particularly within the paramedian right frontal and parietal lobes which may be related to underlying anterior cerebral artery irregularity and diminutive appearance which was present on CT angiogram 12/2/2023. Again MRI evaluation may be warranted to evaluate for new areas of infarction. Electronically Signed: Carly Meredith MD  12/16/2023 8:18 PM EST  Workstation ID: [id number] ORIGINAL REPORT: CT CEREBRAL PERFUSION W WO CONTRAST Date of Exam: 12/16/2023 8:00 PM EST Indication: Neuro deficit, acute, stroke suspected.  Comparison: None available. Technique: Axial CT images of the brain were obtained prior to and after the administration of iodinated contrast. CT Perfusion protocol was utilized. Automated post processing was performed by RAPID software and submitted to PACS for interpretation. Automated exposure control and iterative reconstruction was utilized. Findings: The cerebral blood flow less than 30% is 19 mL. Tmax greater than 6 seconds is 42 mL. Mismatch volume is 23 mL. Mismatch ratio is 2.2. Perfusion defect present within the right temporal lobe extending to the right parietal lobe. Questionable mild perfusion defect seen within the paramedian  right frontal and parietal lobes. Impression: 1.Perfusion defect within the right temporal lobe extending to the right parietal lobe with a mismatch volume of 23 mL. Mismatch ratio is 2.2. Given the degree of hypodensity present, this is age-indeterminate. 2.Questionable mild perfusion defect seen within the paramedian right frontal and parietal lobes. MRI evaluation may be warranted. Electronically Signed: Carly Meredith MD  12/16/2023 8:14 PM EST  Workstation ID: [id number]    Result Date: 12/16/2023  CT CEREBRAL PERFUSION W WO CONTRAST Date of Exam: 12/16/2023 8:00 PM EST Indication: Neuro deficit, acute, stroke suspected.  Comparison: None available. Technique: Axial CT images of the brain were obtained prior to and after the administration of iodinated contrast. CT Perfusion protocol was utilized. Automated post processing was performed by RAPID software and submitted to PACS for interpretation. Automated exposure control and iterative reconstruction was utilized. Findings: The cerebral blood flow less than 30% is 19 mL. Tmax greater than 6 seconds is 42 mL. Mismatch volume is 23 mL. Mismatch ratio is 2.2. Perfusion defect present within the right temporal lobe extending to the right parietal lobe. Questionable mild perfusion defect seen within the paramedian right frontal and parietal lobes.     Impression: 1.Perfusion defect within the right temporal lobe extending to the right parietal lobe with a mismatch volume of 23 mL. Mismatch ratio is 2.2. Given the degree of hypodensity present, this is age-indeterminate. 2.Questionable mild perfusion defect seen within the paramedian right frontal and parietal lobes. MRI evaluation may be warranted. Electronically Signed: Carly Meredith MD  12/16/2023 8:14 PM EST  Workstation ID: [id number]    XR Chest 1 View    Result Date: 12/16/2023  XR CHEST 1 VW Date of Exam: 12/16/2023 8:03 PM EST Indication: numbness Comparison: 12/1/2023. Findings: There are no airspace  consolidations. No pleural fluid. No pneumothorax. The pulmonary vasculature appears within normal limits. The heart appears mildly enlarged, stable as compared to the previous study. Loop recorder device seen projecting over the left hemithorax.. No acute osseous abnormality identified.     Impression: No acute cardiopulmonary process. Electronically Signed: Carly Meredith MD  12/16/2023 8:08 PM EST  Workstation ID: [id number]    CT Head Without Contrast Stroke Protocol    Result Date: 12/16/2023  CT HEAD WO CONTRAST STROKE PROTOCOL Date of Exam: 12/16/2023 6:48 PM CST Indication: headache. Comparison: 5/3/2023 Technique: Axial CT images were obtained of the head without contrast administration.  Reconstructed coronal and sagittal images were also obtained. Automated exposure control and iterative construction methods were used. Scan Time: 19:51 Results discussed with ER staff at 19:57. Findings: There is no evidence of acute territorial infarction. There is no acute intracranial hemorrhage. There are no extra-axial collections. Ventricles and CSF spaces are symmetric. No mass effect nor hydrocephalus. Previous old infarct with corresponding encephalomalacia involving the right frontoparietal region, similar prior exam. There are old lacunar type infarcts involving the left jose and basal ganglia. There is mild to moderate nonspecific white matter hypoattenuation consistent sequela microvascular scheming disease.  Paranasal sinuses and mastoid air cells are adequately aerated.  Osseous structures and orbits appear intact.     Impression: No acute intracranial process identified. Electronically Signed: Dashawn Todd MD  12/16/2023 6:57 PM CST  Workstation ID: [id number]          Current Medications     Scheduled Meds:  [START ON 12/17/2023] sodium chloride, 10 mL, Intravenous, Q12H        [facility]  12/16/23   23:52 EST

## 2023-12-17 NOTE — THERAPY EVALUATION
Acute Care - Speech Language Pathology   Swallow Initial Evaluation [facility]     Patient Name: Lula Burnett  : 1950  MRN: [id number]  Today's Date: 2023               Admit Date: 2023    Visit Dx:     ICD-10-CM ICD-9-CM   1. Acute stroke due to ischemia  I63.9 434.91   2. Dizziness  R42 780.4   3. Facial numbness  R20.0 782.0     Patient Active Problem List   Diagnosis    Diabetes    Diabetic neuropathy    GERD (gastroesophageal reflux disease)    Hypertension    Hypothyroidism    Mitral stenosis    Mixed hyperlipidemia    Anxiety    Depression    Class 1 obesity in adult    Memory impairment    Paroxysmal atrial fibrillation    Left pontine stroke    UTI (urinary tract infection), bacterial    Transaminitis    Facial numbness     Past Medical History:   Diagnosis Date    Anxiety     Asthma     COVID-19     Diagnosed with positive test 22.    CVA (cerebral vascular accident)     x3    Depression     Diabetes     Diabetic neuropathy     Former smoker     GERD (gastroesophageal reflux disease)     Hypertension     Hypothyroidism     Mitral stenosis     Mitral valve annular calcification     Peptic ulceration     PONV (postoperative nausea and vomiting)     Psoriasis     Vertigo      Past Surgical History:   Procedure Laterality Date    CARDIAC CATHETERIZATION      CARDIAC CATHETERIZATION N/A 2022    Procedure: Coronary angiography;  Surgeon: Eugene Lundy MD;  Location: [facility] INVASIVE LOCATION;  Service: Cardiovascular;  Laterality: N/A;    CARDIAC CATHETERIZATION N/A 2022    Procedure: Left heart cath;  Surgeon: Eugene Lundy MD;  Location: [facility] CATH INVASIVE LOCATION;  Service: Cardiovascular;  Laterality: N/A;    CARDIAC CATHETERIZATION N/A 2022    Procedure: Left ventriculography;  Surgeon: Eugene Lundy MD;  Location: [facility] CATH INVASIVE LOCATION;  Service: Cardiovascular;  Laterality: N/A;    CARDIAC ELECTROPHYSIOLOGY PROCEDURE N/A  02/07/2022    Procedure: LOOP INSERTION linq;  Surgeon: Eugene Lundy MD;  Location: [facility] INVASIVE LOCATION;  Service: Cardiovascular;  Laterality: N/A;    CHOLECYSTECTOMY  02/2000    HYSTERECTOMY  1980       SLP Recommendation and Plan  SLP Swallowing Diagnosis: functional oral phase, functional pharyngeal phase (12/17/23 1400)  SLP Diet Recommendation: soft to chew textures, thin liquids (12/17/23 1400)  Recommended Precautions and Strategies: upright posture during/after eating, small bites of food and sips of liquid, alternate between small bites of food and sips of liquid, general aspiration precautions, reflux precautions (12/17/23 1400)  SLP Rec. for Method of Medication Administration: meds whole, with thin liquids, as tolerated (12/17/23 1400)     Monitor for Signs of Aspiration: yes, notify SLP if any concerns, cough, gurgly voice, throat clearing (12/17/23 1400)     Swallow Criteria for Skilled Therapeutic Interventions Met: demonstrates skilled criteria (12/17/23 1400)     Rehab Potential/Prognosis, Swallowing: good, to achieve stated therapy goals (12/17/23 1400)  Therapy Frequency (Swallow): PRN (12/17/23 1400)  Predicted Duration Therapy Intervention (Days): until discharge (12/17/23 1400)  Oral Care Recommendations: Oral Care BID/PRN, Toothbrush (12/17/23 1400)                                      Oral Care Recommendations: Oral Care BID/PRN, Toothbrush (12/17/23 1400)           SWALLOW EVALUATION (last 72 hours)       SLP Adult Swallow Evaluation       Row Name 12/17/23 1400       Rehab Evaluation    Document Type evaluation  -CP    Subjective Information complains of  Thirst  -CP    Patient Observations alert;cooperative  -CP    Patient/Family/Caregiver Comments/Observations No family present  -CP    Patient Effort good  -CP       General Information    Patient Profile Reviewed yes  -CP    Pertinent History Of Current Problem yonathan Burnett is a 73 y.o. female with PMH of  PMH of  hypertension, hyperlipidemia, hypothyroid, diabetes mellitus type 2, paroxysmal atrial fibrillation, and previous strokes presented to the hospital on 12/16/2023 with dizziness nausea vomiting and facial numbness     Known from previous admission 12/1/2023 to 12/4/2023 for treatment of acute left pontine stroke.  Symptoms had resolved over the course of her stay.  She was discharged home in stable condition  pt being w/u for new CVA. pt failed the swallwo screen in the ER.  -CP    Current Method of Nutrition NPO  -CP    Prior Level of Function-Swallowing no diet consistency restrictions;regular textures;thin liquids  -CP    Plans/Goals Discussed with patient  -CP    Barriers to Rehab none identified  -CP       Pain    Additional Documentation Pain Scale: FACES Pre/Post-Treatment (Group)  -CP       Pain Scale: FACES Pre/Post-Treatment    Pain: FACES Scale, Pretreatment 0-->no hurt  -CP    Posttreatment Pain Rating 0-->no hurt  -CP       Oral Motor Structure and Function    Dentition Assessment other (see comments)  Pt has natural upper teeth and a lower partial that she did not bring with her  -CP    Secretion Management WNL/WFL  -CP    Mucosal Quality dry  -CP       Oral Musculature and Cranial Nerve Assessment    Oral Motor General Assessment WFL;other (see comments)  -CP    Oral Motor, Comment Mild L labial asymmetry  -CP       General Eating/Swallowing Observations    Respiratory Support Currently in Use room air  -CP    Eating/Swallowing Skills self-fed;appropriate self-feeding skills observed  -CP    Positioning During Eating upright in bed  -CP    Utensils Used spoon;straw  -CP    Consistencies Trialed thin liquids;pureed;soft to chew textures  -CP       Clinical Swallow Eval    Clinical Swallow Evaluation Summary Pt seen for clinical swallow eval. Pt was seated upright in bed and able to feed herself. Pt reports having multiple strokes but endorses no previous swallow problems. Pt has natural upper teeth and  a lower partial that she did not bring to the hospital. She was assessed with trials of ice chips, water by spoon and straw, applesauce and a Fig dietrich.  Mastication was slow but functional for sodt solid. Oral transit functional. No pocketing or oral residual of any trial occurred. Visualization of swallow suggests timely initiation/functional pharyngeal phase of swallow. After the swallow, pt had no cough or other overt s/s of aspiration. Oral and pharyngeal swallow appear WFL. It is rec that pt initiate a soft to chew diet with thin liquids as tolerated. Pt should be up at 90 degrees for all PO. She should eat at a slow rate and take small bites and sips.  Education provided to pt on safe swallow strategies. Pt verbalized understanding. ST will follow to assure tolerance of diet and make further recs as indicated.  -CP       SLP Evaluation Clinical Impression    SLP Swallowing Diagnosis functional oral phase;functional pharyngeal phase  -CP    Functional Impact risk of aspiration/pneumonia  -CP    Rehab Potential/Prognosis, Swallowing good, to achieve stated therapy goals  -CP    Swallow Criteria for Skilled Therapeutic Interventions Met demonstrates skilled criteria  -CP       SLP Treatment Clinical Impressions    Care Plan Review evaluation/treatment results reviewed  -CP       Recommendations    Therapy Frequency (Swallow) PRN  -CP    Predicted Duration Therapy Intervention (Days) until discharge  -CP    SLP Diet Recommendation soft to chew textures;thin liquids  -CP    Recommended Precautions and Strategies upright posture during/after eating;small bites of food and sips of liquid;alternate between small bites of food and sips of liquid;general aspiration precautions;reflux precautions  -CP    Oral Care Recommendations Oral Care BID/PRN;Toothbrush  -CP    SLP Rec. for Method of Medication Administration meds whole;with thin liquids;as tolerated  -CP    Monitor for Signs of Aspiration yes;notify SLP if any  concerns;cough;gurgly voice;throat clearing  -CP       Swallow Goals (SLP)    Swallow LTGs Swallow Long Term Goal (free text)  -CP    Swallow STGs diet tolerance goal selection (SLP)  -CP    Diet Tolerance Goal Selection (SLP) Swallow Short Term Goal 2;Swallow Short Term Goal 1  -CP       (LTG) Swallow    (LTG) Swallow Pt will maximize swallow function for least restrictive PO diet, exhibiting no complication associated with dysphagia, adequate PO intake, and demonstrating independent use of swallow compensations  -CP    Time Frame (Swallow Long Term Goal) by discharge  -CP       (STG) Swallow 1    (STG) Swallow 1 The patient will participate in a meal/follow-up assessment to determine safety and adequacy of recommended diet, independent use of safe swallow compensations, pt/family education and additional goals/recommendations to follow  -CP    Time Frame (Swallow Short Term Goal 1) 1 week  -CP       (STG) Swallow 2    (STG) Swallow 2 Pt will participate in ongoing swallow assessment to include VFSS if indicated, and caregiver teaching.  -CP    Time Frame (Swallow Short Term Goal 2) 1 week  -CP              User Key  (r) = Recorded By, (t) = Taken By, (c) = Cosigned By      Initials Name Effective Dates    CP Tiffany Nunez, ANDREE 06/16/21 -                     EDUCATION  The patient has been educated in the following areas:   Dysphagia (Swallowing Impairment) Oral Care/Hydration.        SLP GOALS       Row Name 12/17/23 1400             (LTG) Swallow    (LTG) Swallow Pt will maximize swallow function for least restrictive PO diet, exhibiting no complication associated with dysphagia, adequate PO intake, and demonstrating independent use of swallow compensations  -CP      Time Frame (Swallow Long Term Goal) by discharge  -CP         (STG) Swallow 1    (STG) Swallow 1 The patient will participate in a meal/follow-up assessment to determine safety and adequacy of recommended diet, independent use of safe swallow  compensations, pt/family education and additional goals/recommendations to follow  -CP      Time Frame (Swallow Short Term Goal 1) 1 week  -CP         (STG) Swallow 2    (STG) Swallow 2 Pt will participate in ongoing swallow assessment to include VFSS if indicated, and caregiver teaching.  -CP      Time Frame (Swallow Short Term Goal 2) 1 week  -CP                User Key  (r) = Recorded By, (t) = Taken By, (c) = Cosigned By      Initials Name Provider Type    CP Tiffany Nunez, SLP Speech and Language Pathologist                       Time Calculation:                ANDREE Thorpe  12/17/2023

## 2023-12-18 ENCOUNTER — NURSE TRIAGE (OUTPATIENT)
Dept: CALL CENTER | Facility: HOSPITAL | Age: 73
End: 2023-12-18
Payer: MEDICARE

## 2023-12-18 VITALS
HEART RATE: 68 BPM | TEMPERATURE: 97.7 F | SYSTOLIC BLOOD PRESSURE: 154 MMHG | RESPIRATION RATE: 14 BRPM | HEIGHT: 62 IN | DIASTOLIC BLOOD PRESSURE: 74 MMHG | OXYGEN SATURATION: 94 % | BODY MASS INDEX: 29.09 KG/M2 | WEIGHT: 158.07 LBS

## 2023-12-18 PROBLEM — E66.3 OVERWEIGHT (BMI 25.0-29.9): Status: ACTIVE | Noted: 2023-12-18

## 2023-12-18 PROBLEM — Z86.73 HISTORY OF CVA (CEREBROVASCULAR ACCIDENT): Status: ACTIVE | Noted: 2023-12-18

## 2023-12-18 LAB
ANION GAP SERPL CALCULATED.3IONS-SCNC: 13 MMOL/L (ref 5–15)
BASOPHILS # BLD AUTO: 0 10*3/MM3 (ref 0–0.2)
BASOPHILS NFR BLD AUTO: 0.4 % (ref 0–1.5)
BUN SERPL-MCNC: 33 MG/DL (ref 8–23)
BUN/CREAT SERPL: 26.6 (ref 7–25)
CALCIUM SPEC-SCNC: 9.6 MG/DL (ref 8.6–10.5)
CHLORIDE SERPL-SCNC: 100 MMOL/L (ref 98–107)
CO2 SERPL-SCNC: 29 MMOL/L (ref 22–29)
CREAT SERPL-MCNC: 1.24 MG/DL (ref 0.57–1)
DEPRECATED RDW RBC AUTO: 53.8 FL (ref 37–54)
EGFRCR SERPLBLD CKD-EPI 2021: 45.5 ML/MIN/1.73
EOSINOPHIL # BLD AUTO: 0.1 10*3/MM3 (ref 0–0.4)
EOSINOPHIL NFR BLD AUTO: 1.6 % (ref 0.3–6.2)
ERYTHROCYTE [DISTWIDTH] IN BLOOD BY AUTOMATED COUNT: 16.8 % (ref 12.3–15.4)
GLUCOSE BLDC GLUCOMTR-MCNC: 126 MG/DL (ref 70–105)
GLUCOSE BLDC GLUCOMTR-MCNC: 173 MG/DL (ref 70–105)
GLUCOSE SERPL-MCNC: 115 MG/DL (ref 65–99)
HCT VFR BLD AUTO: 42.1 % (ref 34–46.6)
HGB BLD-MCNC: 13.8 G/DL (ref 12–15.9)
LYMPHOCYTES # BLD AUTO: 3.8 10*3/MM3 (ref 0.7–3.1)
LYMPHOCYTES NFR BLD AUTO: 46.1 % (ref 19.6–45.3)
MAGNESIUM SERPL-MCNC: 2.5 MG/DL (ref 1.6–2.4)
MCH RBC QN AUTO: 28.7 PG (ref 26.6–33)
MCHC RBC AUTO-ENTMCNC: 32.8 G/DL (ref 31.5–35.7)
MCV RBC AUTO: 87.4 FL (ref 79–97)
MONOCYTES # BLD AUTO: 0.5 10*3/MM3 (ref 0.1–0.9)
MONOCYTES NFR BLD AUTO: 5.7 % (ref 5–12)
NEUTROPHILS NFR BLD AUTO: 3.8 10*3/MM3 (ref 1.7–7)
NEUTROPHILS NFR BLD AUTO: 46.2 % (ref 42.7–76)
NRBC BLD AUTO-RTO: 0 /100 WBC (ref 0–0.2)
PLATELET # BLD AUTO: 328 10*3/MM3 (ref 140–450)
PMV BLD AUTO: 8 FL (ref 6–12)
POTASSIUM SERPL-SCNC: 3 MMOL/L (ref 3.5–5.2)
RBC # BLD AUTO: 4.81 10*6/MM3 (ref 3.77–5.28)
SODIUM SERPL-SCNC: 142 MMOL/L (ref 136–145)
WBC NRBC COR # BLD AUTO: 8.3 10*3/MM3 (ref 3.4–10.8)

## 2023-12-18 PROCEDURE — 99214 OFFICE O/P EST MOD 30 MIN: CPT | Performed by: INTERNAL MEDICINE

## 2023-12-18 PROCEDURE — 97161 PT EVAL LOW COMPLEX 20 MIN: CPT

## 2023-12-18 PROCEDURE — 83735 ASSAY OF MAGNESIUM: CPT | Performed by: STUDENT IN AN ORGANIZED HEALTH CARE EDUCATION/TRAINING PROGRAM

## 2023-12-18 PROCEDURE — 85025 COMPLETE CBC W/AUTO DIFF WBC: CPT | Performed by: STUDENT IN AN ORGANIZED HEALTH CARE EDUCATION/TRAINING PROGRAM

## 2023-12-18 PROCEDURE — G0378 HOSPITAL OBSERVATION PER HR: HCPCS

## 2023-12-18 PROCEDURE — 63710000001 INSULIN LISPRO (HUMAN) PER 5 UNITS: Performed by: NURSE PRACTITIONER

## 2023-12-18 PROCEDURE — 80048 BASIC METABOLIC PNL TOTAL CA: CPT | Performed by: STUDENT IN AN ORGANIZED HEALTH CARE EDUCATION/TRAINING PROGRAM

## 2023-12-18 PROCEDURE — 82948 REAGENT STRIP/BLOOD GLUCOSE: CPT

## 2023-12-18 RX ORDER — ASPIRIN 81 MG/1
81 TABLET, CHEWABLE ORAL DAILY
Status: DISCONTINUED | OUTPATIENT
Start: 2023-12-18 | End: 2023-12-18 | Stop reason: HOSPADM

## 2023-12-18 RX ORDER — POTASSIUM CHLORIDE 20 MEQ/1
20 TABLET, EXTENDED RELEASE ORAL DAILY
Status: DISCONTINUED | OUTPATIENT
Start: 2023-12-18 | End: 2023-12-18 | Stop reason: HOSPADM

## 2023-12-18 RX ORDER — ROSUVASTATIN CALCIUM 10 MG/1
20 TABLET, COATED ORAL NIGHTLY
Status: DISCONTINUED | OUTPATIENT
Start: 2023-12-18 | End: 2023-12-18 | Stop reason: HOSPADM

## 2023-12-18 RX ORDER — LEVOTHYROXINE SODIUM 0.05 MG/1
50 TABLET ORAL
Status: DISCONTINUED | OUTPATIENT
Start: 2023-12-18 | End: 2023-12-18 | Stop reason: HOSPADM

## 2023-12-18 RX ORDER — CARVEDILOL 6.25 MG/1
6.25 TABLET ORAL 2 TIMES DAILY WITH MEALS
Status: DISCONTINUED | OUTPATIENT
Start: 2023-12-18 | End: 2023-12-18 | Stop reason: HOSPADM

## 2023-12-18 RX ADMIN — TICAGRELOR 90 MG: 90 TABLET ORAL at 09:10

## 2023-12-18 RX ADMIN — CARVEDILOL 6.25 MG: 6.25 TABLET, FILM COATED ORAL at 09:10

## 2023-12-18 RX ADMIN — ASPIRIN 81 MG CHEWABLE TABLET 81 MG: 81 TABLET CHEWABLE at 09:09

## 2023-12-18 RX ADMIN — APIXABAN 5 MG: 5 TABLET, FILM COATED ORAL at 11:52

## 2023-12-18 RX ADMIN — Medication 10 ML: at 08:45

## 2023-12-18 RX ADMIN — POTASSIUM CHLORIDE 20 MEQ: 1500 TABLET, EXTENDED RELEASE ORAL at 09:09

## 2023-12-18 RX ADMIN — LEVOTHYROXINE SODIUM 50 MCG: 50 TABLET ORAL at 09:09

## 2023-12-18 RX ADMIN — INSULIN LISPRO 5 UNITS: 100 INJECTION, SOLUTION INTRAVENOUS; SUBCUTANEOUS at 08:44

## 2023-12-18 RX ADMIN — INSULIN LISPRO 6 UNITS: 100 INJECTION, SOLUTION INTRAVENOUS; SUBCUTANEOUS at 11:52

## 2023-12-18 NOTE — CONSULTS
Diabetes Education    Patient Name:  Lula Burnett  YOB: 1950  MRN: [id number]  Admit Date:  12/16/2023    Consult received due to A1c >7%. A1c on 12/2/2023 12.2%. [facility] diabetes educator saw pt for this A1c result on 12/4/2023. Adm bs 236. Educator met with pt at [facility]. Pt taking Lantus 30 units hs and Lispro 10 units ac. Pt states has been taking insulin as prescribed. Mealtime insulin was new for pt since last d/c from [facility] in 12/2023. Pt states bs has been doing much better since taking mealtime insulin. Pt checking bs in am. Discussed importance of checking bs 3 times/day and recording readings. Discussed importance of reporting readings to MD routinely. Pt states has bs monitoring supplies at home. Pt states not needing additional info at this time since she was seen by inpatient diabetes educator on 12/4/2023.       Electronically signed by:  Sveta Desouza RN  12/18/23 13:04 EST

## 2023-12-18 NOTE — PLAN OF CARE
Problem: Adult Inpatient Plan of Care  Goal: Plan of Care Review  Outcome: Ongoing, Progressing  Flowsheets (Taken 12/18/2023 0439)  Progress: improving  Plan of Care Reviewed With: patient  Goal: Patient-Specific Goal (Individualized)  Outcome: Ongoing, Progressing  Goal: Absence of Hospital-Acquired Illness or Injury  Outcome: Ongoing, Progressing  Intervention: Identify and Manage Fall Risk  Recent Flowsheet Documentation  Taken 12/18/2023 0400 by Eva Garcia LPN  Safety Promotion/Fall Prevention:   assistive device/personal items within reach   clutter free environment maintained   fall prevention program maintained   lighting adjusted   nonskid shoes/slippers when out of bed   room organization consistent   safety round/check completed  Taken 12/18/2023 0200 by Eva Garcia LPN  Safety Promotion/Fall Prevention:   assistive device/personal items within reach   clutter free environment maintained   fall prevention program maintained   lighting adjusted   nonskid shoes/slippers when out of bed   room organization consistent   safety round/check completed  Taken 12/18/2023 0000 by Eva Garcia LPN  Safety Promotion/Fall Prevention:   assistive device/personal items within reach   activity supervised   clutter free environment maintained   fall prevention program maintained   lighting adjusted   nonskid shoes/slippers when out of bed   room organization consistent   safety round/check completed  Taken 12/17/2023 2200 by Eva Garcia LPN  Safety Promotion/Fall Prevention:   activity supervised   assistive device/personal items within reach   clutter free environment maintained   fall prevention program maintained   lighting adjusted   nonskid shoes/slippers when out of bed   room organization consistent   safety round/check completed  Taken 12/17/2023 2000 by Eva Garcia LPN  Safety Promotion/Fall Prevention:   activity supervised   assistive device/personal items within reach   clutter free  environment maintained   fall prevention program maintained   lighting adjusted   nonskid shoes/slippers when out of bed   room organization consistent   safety round/check completed  Intervention: Prevent Skin Injury  Recent Flowsheet Documentation  Taken 12/17/2023 2000 by Eva Garcia LPN  Skin Protection:   adhesive use limited   tubing/devices free from skin contact  Intervention: Prevent and Manage VTE (Venous Thromboembolism) Risk  Recent Flowsheet Documentation  Taken 12/17/2023 2000 by Eva Garcia LPN  VTE Prevention/Management:   bilateral   sequential compression devices off   patient refused intervention  Range of Motion: active ROM (range of motion) encouraged  Intervention: Prevent Infection  Recent Flowsheet Documentation  Taken 12/18/2023 0400 by Eva Garcia LPN  Infection Prevention:   environmental surveillance performed   equipment surfaces disinfected   hand hygiene promoted   personal protective equipment utilized   rest/sleep promoted   single patient room provided  Taken 12/18/2023 0200 by Eva Garcia LPN  Infection Prevention:   environmental surveillance performed   equipment surfaces disinfected   hand hygiene promoted   personal protective equipment utilized   rest/sleep promoted   single patient room provided  Taken 12/18/2023 0000 by Eva Garcia LPN  Infection Prevention:   environmental surveillance performed   equipment surfaces disinfected   hand hygiene promoted   personal protective equipment utilized   rest/sleep promoted   single patient room provided  Taken 12/17/2023 2200 by Eva Garcia LPN  Infection Prevention:   environmental surveillance performed   equipment surfaces disinfected   hand hygiene promoted   personal protective equipment utilized   rest/sleep promoted   single patient room provided  Taken 12/17/2023 2000 by vEa Garcia LPN  Infection Prevention:   environmental surveillance performed   equipment surfaces disinfected   hand  hygiene promoted   personal protective equipment utilized   rest/sleep promoted   single patient room provided  Goal: Optimal Comfort and Wellbeing  Outcome: Ongoing, Progressing  Intervention: Monitor Pain and Promote Comfort  Recent Flowsheet Documentation  Taken 12/17/2023 2000 by Eva Garcia LPN  Pain Management Interventions:   unnecessary movement minimized   see MAR   quiet environment facilitated   position adjusted   pillow support provided   care clustered  Intervention: Provide Person-Centered Care  Recent Flowsheet Documentation  Taken 12/17/2023 2000 by Eva Garcia LPN  Trust Relationship/Rapport:   care explained   choices provided   emotional support provided  Goal: Readiness for Transition of Care  Outcome: Ongoing, Progressing     Problem: Asthma Comorbidity  Goal: Maintenance of Asthma Control  Outcome: Ongoing, Progressing  Intervention: Maintain Asthma Symptom Control  Recent Flowsheet Documentation  Taken 12/18/2023 0400 by Eva Garcia LPN  Medication Review/Management: medications reviewed  Taken 12/18/2023 0200 by Eva Garcia LPN  Medication Review/Management: medications reviewed  Taken 12/18/2023 0000 by Eva Garcia LPN  Medication Review/Management: medications reviewed  Taken 12/17/2023 2200 by Eva Garcia LPN  Medication Review/Management: medications reviewed  Taken 12/17/2023 2000 by Eva Garcia LPN  Medication Review/Management: medications reviewed     Problem: Diabetes Comorbidity  Goal: Blood Glucose Level Within Targeted Range  Outcome: Ongoing, Progressing  Intervention: Monitor and Manage Glycemia  Recent Flowsheet Documentation  Taken 12/17/2023 2000 by Eva Garcia LPN  Glycemic Management: blood glucose monitored     Problem: Adjustment to Illness (Stroke, Ischemic/Transient Ischemic Attack)  Goal: Optimal Coping  Outcome: Ongoing, Progressing     Problem: Bowel Elimination Impaired (Stroke, Ischemic/Transient Ischemic Attack)  Goal:  Effective Bowel Elimination  Outcome: Ongoing, Progressing     Problem: Cerebral Tissue Perfusion (Stroke, Ischemic/Transient Ischemic Attack)  Goal: Optimal Cerebral Tissue Perfusion  Outcome: Ongoing, Progressing  Intervention: Protect and Optimize Cerebral Perfusion  Recent Flowsheet Documentation  Taken 12/17/2023 2000 by Eva Garcia LPN  Cerebral Perfusion Promotion: blood pressure monitored     Problem: Cognitive Impairment (Stroke, Ischemic/Transient Ischemic Attack)  Goal: Optimal Cognitive Function  Outcome: Ongoing, Progressing     Problem: Communication Impairment (Stroke, Ischemic/Transient Ischemic Attack)  Goal: Improved Communication Skills  Outcome: Ongoing, Progressing  Intervention: Optimize Communication Skills  Recent Flowsheet Documentation  Taken 12/17/2023 2000 by Eva Garcia LPN  Communication Enhancement Strategies: call light answered in person     Problem: Functional Ability Impaired (Stroke, Ischemic/Transient Ischemic Attack)  Goal: Optimal Functional Ability  Outcome: Ongoing, Progressing     Problem: Respiratory Compromise (Stroke, Ischemic/Transient Ischemic Attack)  Goal: Effective Oxygenation and Ventilation  Outcome: Ongoing, Progressing     Problem: Sensorimotor Impairment (Stroke, Ischemic/Transient Ischemic Attack)  Goal: Improved Sensorimotor Function  Outcome: Ongoing, Progressing  Intervention: Optimize Range of Motion, Motor Control and Function  Recent Flowsheet Documentation  Taken 12/17/2023 2000 by Eva Garcia LPN  Range of Motion: active ROM (range of motion) encouraged  Intervention: Optimize Sensory and Perceptual Ability  Recent Flowsheet Documentation  Taken 12/17/2023 2000 by Eva Garcia LPN  Pressure Reduction Techniques: frequent weight shift encouraged  Pressure Reduction Devices: pressure-redistributing mattress utilized     Problem: Swallowing Impairment (Stroke, Ischemic/Transient Ischemic Attack)  Goal: Optimal Eating and Swallowing  without Aspiration  Outcome: Ongoing, Progressing     Problem: Urinary Elimination Impaired (Stroke, Ischemic/Transient Ischemic Attack)  Goal: Effective Urinary Elimination  Outcome: Ongoing, Progressing     Problem: Fall Injury Risk  Goal: Absence of Fall and Fall-Related Injury  Outcome: Ongoing, Progressing  Intervention: Identify and Manage Contributors  Recent Flowsheet Documentation  Taken 12/18/2023 0400 by Eva Garcia LPN  Medication Review/Management: medications reviewed  Taken 12/18/2023 0200 by Eva Garcia LPN  Medication Review/Management: medications reviewed  Taken 12/18/2023 0000 by Eva Garcia LPN  Medication Review/Management: medications reviewed  Taken 12/17/2023 2200 by Eva Garcia LPN  Medication Review/Management: medications reviewed  Taken 12/17/2023 2000 by Eva Garcia LPN  Medication Review/Management: medications reviewed  Intervention: Promote Injury-Free Environment  Recent Flowsheet Documentation  Taken 12/18/2023 0400 by Eva Garcia LPN  Safety Promotion/Fall Prevention:   assistive device/personal items within reach   clutter free environment maintained   fall prevention program maintained   lighting adjusted   nonskid shoes/slippers when out of bed   room organization consistent   safety round/check completed  Taken 12/18/2023 0200 by Eva Garcia LPN  Safety Promotion/Fall Prevention:   assistive device/personal items within reach   clutter free environment maintained   fall prevention program maintained   lighting adjusted   nonskid shoes/slippers when out of bed   room organization consistent   safety round/check completed  Taken 12/18/2023 0000 by Eva Garcia LPN  Safety Promotion/Fall Prevention:   assistive device/personal items within reach   activity supervised   clutter free environment maintained   fall prevention program maintained   lighting adjusted   nonskid shoes/slippers when out of bed   room organization consistent   safety  round/check completed  Taken 12/17/2023 2200 by Eva Garcia LPN  Safety Promotion/Fall Prevention:   activity supervised   assistive device/personal items within reach   clutter free environment maintained   fall prevention program maintained   lighting adjusted   nonskid shoes/slippers when out of bed   room organization consistent   safety round/check completed  Taken 12/17/2023 2000 by Eva Garcia LPN  Safety Promotion/Fall Prevention:   activity supervised   assistive device/personal items within reach   clutter free environment maintained   fall prevention program maintained   lighting adjusted   nonskid shoes/slippers when out of bed   room organization consistent   safety round/check completed   Goal Outcome Evaluation:  Plan of Care Reviewed With: patient        Progress: improving

## 2023-12-18 NOTE — THERAPY EVALUATION
Patient Name: Lula Burnett  : 1950    MRN: [id number]                              Today's Date: 2023       Admit Date: 2023    Visit Dx:     ICD-10-CM ICD-9-CM   1. Acute stroke due to ischemia  I63.9 434.91   2. Dizziness  R42 780.4   3. Facial numbness  R20.0 782.0     Patient Active Problem List   Diagnosis    Type 2 diabetes mellitus with diabetic neuropathy, with long-term current use of insulin    Diabetic neuropathy    GERD (gastroesophageal reflux disease)    Primary hypertension    Hypothyroidism    Mitral stenosis    Mixed hyperlipidemia    Anxiety    Depression    Overweight (BMI 25.0-29.9)    Memory impairment    Paroxysmal atrial fibrillation    Left pontine stroke    UTI (urinary tract infection), bacterial    Transaminitis    Facial numbness    History of CVA (cerebrovascular accident)     Past Medical History:   Diagnosis Date    Anxiety     Asthma     COVID-19     Diagnosed with positive test 22.    CVA (cerebral vascular accident)     x3    Depression     Diabetes     Diabetic neuropathy     Former smoker     GERD (gastroesophageal reflux disease)     Hypertension     Hypothyroidism     Mitral stenosis     Mitral valve annular calcification     Peptic ulceration     PONV (postoperative nausea and vomiting)     Psoriasis     Vertigo      Past Surgical History:   Procedure Laterality Date    CARDIAC CATHETERIZATION      CARDIAC CATHETERIZATION N/A 2022    Procedure: Coronary angiography;  Surgeon: Eugene Lundy MD;  Location:  [facility];  Service: Cardiovascular;  Laterality: N/A;    CARDIAC CATHETERIZATION N/A 2022    Procedure: Left heart cath;  Surgeon: Eugene Lundy MD;  Location:  [facility];  Service: Cardiovascular;  Laterality: N/A;    CARDIAC CATHETERIZATION N/A 2022    Procedure: Left ventriculography;  Surgeon: Eugene Lundy MD;  Location:  [facility];  Service:  "Cardiovascular;  Laterality: N/A;    CARDIAC ELECTROPHYSIOLOGY PROCEDURE N/A 02/07/2022    Procedure: LOOP INSERTION linq;  Surgeon: Eugene Lundy MD;  Location: [facility]VAS[facility] LOCATION;  Service: Cardiovascular;  Laterality: N/A;    CHOLECYSTECTOMY  02/2000    HYSTERECTOMY  1980      General Information       Row Name 12/18/23 1142          Physical Therapy Time and Intention    Document Type evaluation  -AM     Mode of Treatment physical therapy  -AM       Row Name 12/18/23 1142          General Information    Patient Profile Reviewed yes  -AM     Prior Level of Function independent:;all household mobility;community mobility;gait;transfer;bed mobility  \"sometimes:\" uses st cane with ambulation  -AM     Barriers to Rehab previous functional deficit;medically complex  -AM       Row Name 12/18/23 1142          Living Environment    People in Home alone  -AM       Row Name 12/18/23 1142          Home Main Entrance    Number of Stairs, Main Entrance --  has ramp  -AM       Row Name 12/18/23 1142          Stairs Within Home, Primary    Number of Stairs, Within Home, Primary none  -AM     Stair Railings, Within Home, Primary none  -AM       Row Name 12/18/23 1142          Cognition    Orientation Status (Cognition) oriented x 4  -AM       Row Name 12/18/23 1142          Safety Issues, Functional Mobility    Comment, Safety Issues/Impairments (Mobility) gait belt utilized  -AM               User Key  (r) = Recorded By, (t) = Taken By, (c) = Cosigned By      Initials Name Provider Type    AM Chong Crane, PT Physical Therapist                   Mobility       Row Name 12/18/23 1143          Bed Mobility    Bed Mobility bed mobility (all) activities  -AM     All Activities, [place] (Bed Mobility) independent  -AM       Row Name 12/18/23 1143          Sit-Stand Transfer    Sit-Stand [place] (Transfers) modified independence  -AM       Row Name 12/18/23 1143          Gait/Stairs (Locomotion)    " [place] Level (Gait) modified independence  -AM     Distance in Feet (Gait) 180  -AM     Comment, (Gait/Stairs) wide JULIO, decreased gait speed, veers to L side when ambulating in hallway  -AM               User Key  (r) = Recorded By, (t) = Taken By, (c) = Cosigned By      Initials Name Provider Type    AM Chong Crane PT Physical Therapist                   Obj/Interventions       Row Name 12/18/23 1144          Range of Motion Comprehensive    General Range of Motion bilateral lower extremity ROM WFL  -AM       Row Name 12/18/23 1144          Strength Comprehensive (MMT)    Comment, General Manual Muscle Testing (MMT) Assessment 4+/5 throughout BLEs  -AM       Row Name 12/18/23 1144          Balance    Balance Assessment sitting static balance;sitting dynamic balance;sit to stand dynamic balance;standing static balance;standing dynamic balance  -AM     Static Sitting Balance independent  -AM     Dynamic Sitting Balance independent  -AM     Position, Sitting Balance supported;sitting in chair  -AM     Sit to Stand Dynamic Balance modified independence  -AM     Static Standing Balance modified independence  -AM     Dynamic Standing Balance modified independence  -AM     Position/Device Used, Standing Balance unsupported  -AM       Row Name 12/18/23 1144          Sensory Assessment (Somatosensory)    Sensory Assessment (Somatosensory) sensation intact  -AM               User Key  (r) = Recorded By, (t) = Taken By, (c) = Cosigned By      Initials Name Provider Type    AM Chong Crane PT Physical Therapist                   Goals/Plan    No documentation.                  Clinical Impression       Row Name 12/18/23 1145          Pain    Pretreatment Pain Rating 0/10 - no pain  -AM     Posttreatment Pain Rating 0/10 - no pain  -AM       [facility] 12/18/23 1145          Plan of Care Review    Outcome Evaluation Pt is a 72 y/o female with PMH of previous strokes.  Presented to hospital secondary to dizziness,  "nausea and vomiting, facial numbness.  Pt seen on 12/1-12/4 in hospital with acute L pontine stroke- symptoms resolved.  CT head: (-) acute, Chest X-ray: (-) acute.  MRI brain: (-) acute, encephalomalacia from prior infarct R parietal occipital lobe.   Pt reports she lives alone in an RV with ramp present.  Pt independent with all functional mobility tasks and \"sometimes ambulates with a st cane if needed\".  Pt on room air and telemetry this date sitting up in b/s chair.  Pt was mod I for transfers, bed moblity and ambulated 180' without an assistive device with mod I .  Pt with wide JULIO and decreased gait speed.  Pt veers to L side when ambulating in hallway.  Pt is at baseline level for all mobility tasks and has no skilled PT needs at this time.  Will d/c from inpt PT services.  -AM       Row Name 12/18/23 1145          Therapy Assessment/Plan (PT)    Patient/Family Therapy Goals Statement (PT) to go home  -AM     Criteria for Skilled Interventions Met (PT) no problems identified which require skilled intervention  -AM     Therapy Frequency (PT) evaluation only  -AM       Row Name 12/18/23 1145          Vital Signs    O2 Delivery Pre Treatment room air  -AM     O2 Delivery Intra Treatment room air  -AM     O2 Delivery Post Treatment room air  -AM     Pre Patient Position Sitting  -AM     Intra Patient Position Supine  -AM     Post Patient Position Sitting  -AM       Row Name 12/18/23 1145          Positioning and Restraints    Pre-Treatment Position in bed  -AM     Post Treatment Position chair  -AM     In Chair reclined;call light within reach;encouraged to call for assist  -AM               User Key  (r) = Recorded By, (t) = Taken By, (c) = Cosigned By      Initials Name Provider Type    AM Chong Crane, PT Physical Therapist                   Outcome Measures       Row Name 12/18/23 1154 12/18/23 0800       How much help from another person do you currently need...    Turning from your back to your side " while in flat bed without using bedrails? 4  -AM 4  -BL    Moving from lying on back to sitting on the side of a flat bed without bedrails? 4  -AM 4  -BL    Moving to and from a bed to a chair (including a wheelchair)? 4  -AM 4  -BL    Standing up from a chair using your arms (e.g., wheelchair, bedside chair)? 4  -AM 4  -BL    Climbing 3-5 steps with a railing? 3  -AM 3  -BL    To walk in hospital room? 4  -AM 4  -BL    AM-PAC 6 Clicks Score (PT) 23  -AM 23  -BL    Highest Level of Mobility Goal 7 --> Walk 25 feet or more  -AM 7 --> Walk 25 feet or more  -BL      Row Name 12/18/23 1154          Modified [place] Scale    Pre-Stroke Modified [place] Scale 0 - No Symptoms at all.  -AM     Modified [place] Scale 0 - No Symptoms at all.  -AM       Row Name 12/18/23 1154          Functional Assessment    Outcome Measure Options Modified [place]  -AM               User Key  (r) = Recorded By, (t) = Taken By, (c) = Cosigned By      Initials Name Provider Type    AM Chong Crane, PT Physical Therapist    Cristin Pond, RN Registered Nurse                                 Physical Therapy Education       Title: PT OT SLP Therapies (Done)       Topic: Physical Therapy (Done)       Point: Mobility training (Done)       Learning Progress Summary             Patient Acceptance, E,TB, VU by AM at 12/18/2023 1158                         Point: Body mechanics (Done)       Learning Progress Summary             Patient Acceptance, E,TB, VU by AM at 12/18/2023 1158                         Point: Precautions (Done)       Learning Progress Summary             Patient Acceptance, E,TB, VU by AM at 12/18/2023 1158                                         User Key       Initials Effective Dates Name Provider Type Discipline    AM 05/10/21 -  Chong Crane, PT Physical Therapist PT                  PT Recommendation and Plan     Outcome Evaluation: Pt is a 72 y/o female with PMH of previous strokes.  Presented to hospital secondary to  "dizziness, nausea and vomiting, facial numbness.  Pt seen on 12/1-12/4 in hospital with acute L pontine stroke- symptoms resolved.  CT head: (-) acute, Chest X-ray: (-) acute.  MRI brain: (-) acute, encephalomalacia from prior infarct R parietal occipital lobe.   Pt reports she lives alone in an RV with ramp present.  Pt independent with all functional mobility tasks and \"sometimes ambulates with a st cane if needed\".  Pt on room air and telemetry this date sitting up in b/s chair.  Pt was mod I for transfers, bed moblity and ambulated 180' without an assistive device with mod I .  Pt with wide JULIO and decreased gait speed.  Pt veers to L side when ambulating in hallway.  Pt is at baseline level for all mobility tasks and has no skilled PT needs at this time.  Will d/c from inpt PT services.     Time Calculation:         PT Charges       Row Name 12/18/23 1159             Time Calculation    Start Time 0929  -AM      Stop Time 0939  -AM      Time Calculation (min) 10 min  -AM      PT Received On 12/18/23  -AM                User Key  (r) = Recorded By, (t) = Taken By, (c) = Cosigned By      Initials Name Provider Type    AM Chong Crane, PT Physical Therapist                  Therapy Charges for Today       Code Description Service Date Service Provider Modifiers Qty    [id number]  PT EVAL LOW COMPLEXITY 2 12/18/2023 Chong Crane, PT GP 1            PT G-Codes  Outcome Measure Options: Modified [place]  AM-PAC 6 Clicks Score (PT): 23  AM-PAC 6 Clicks Score (OT): 24  Modified [place] Scale: 0 - No Symptoms at all.  PT Discharge Summary  Anticipated Discharge Disposition (PT): home    Chong Crane PT  12/18/2023    "

## 2023-12-18 NOTE — CASE MANAGEMENT/SOCIAL WORK
Continued Stay Note   Roger     Patient Name: Lula Burnett  MRN: [id number]  Today's Date: 12/18/2023    Admit Date: 12/16/2023    Plan: Home declines H.H, Outpt or need for walker   Discharge Plan       Row Name 12/18/23 1347       Plan    Plan Home declines H.H, Outpt or need for walker    Plan Comments Met with Ms Burnett at bedside. She no longer has a PCP. Gave her phone # patient connection hub.  The phone # was placed on her AVS. No difficulties paying for her meds thru [facility].  Usually has small co-pay for prescriptions.   wants to place her on [facility]. Our [facility] was unable to do cost check.  No contract with .  She is making call to her local pharmacy before she leaves today to make sure she can afford.  Notified RN to check with pt. before she leaves in case alternative med is needed. No other needs expressed or identified.               Expected Discharge Date and Time       Expected Discharge Date Expected Discharge Time    Dec 18, 2023               Radha Christian, RN

## 2023-12-18 NOTE — DISCHARGE SUMMARY
Discharge Note   Lula Burnett 1950 [id number]     Date of Admission:12/16/2023     Date of Discharge: 12/18/23     Admission Diagnosis: Dizziness [R42]  Facial numbness [R20.0]  Acute stroke due to ischemia [I63.9]     Discharge Diagnosis:     Facial numbness    Type 2 diabetes mellitus with diabetic neuropathy, with long-term current use of insulin    Primary hypertension    Hypothyroidism    Mixed hyperlipidemia    Overweight (BMI 25.0-29.9)    Paroxysmal atrial fibrillation    History of CVA (cerebrovascular accident)       Consults: neurology    Hospital Course: Lula Burnett is a 73 y.o. female with PMH of  PMH of hypertension, hyperlipidemia, hypothyroid, diabetes mellitus type 2, paroxysmal atrial fibrillation, and previous strokes presented to the hospital on 12/16/2023 with dizziness nausea vomiting and facial numbness. Known from previous admission 12/1/2023 to 12/4/2023 for treatment of acute left pontine stroke. This adm, the CT head showed diminutive appearance of the anterior cerebral arteries bilaterally, right greater than left, similar compared to the previous study.  Diminutive appearance of the right PCA appears similar as compared to previous study no large vessel occlusion identified.  No additional evidence of hemodynamically significant stenosis, AVM or aneurysm. MRI brain without any acute changes. Cardio consulted for the anticoagulation for paroxysmal atrial fibrillation and rec to continue ASA, stop brinlanta and start on eliquis 5 mg PO BID. Digoxin is dc as there is digoxin level of 1.5 and cardio rec to stop on dc.  She is dc with stable clinical condition.     Vitals:    12/18/23 0751   BP: 154/74   Pulse: 68   Resp: 14   Temp: 97.7 °F (36.5 °C)   SpO2: 94%        Physical Exam   Physical Exam  HENT:      Head: Normocephalic and atraumatic.      Nose: Nose normal.   Eyes:      Extraocular Movements: Extraocular movements intact.      Conjunctivae/sclera: Conjunctivae  normal.      Pupils: Pupils are equal, round, and reactive to light.   Cardiovascular:      Rate and Rhythm: normal       Pulses: Normal pulses.      Heart sounds: Normal heart sounds.   Pulmonary:      Effort: normal      Breath sounds: normal   Abdominal:      General: Abdomen is flat. Bowel sounds are normal.      Palpations: Abdomen is soft.   Musculoskeletal:         General: Normal range of motion.      Cervical back: Normal range of motion and neck supple.   Skin:     General: Skin is dry.   Neurological:      General: No focal deficit present.      Mental Status: alert.   Psychiatric:         Mood and Affect: Mood normal.          Disposition: Home      Discharged Condition: good    Activity: activity as tolerated    Diet:  Diet Order   Procedures    Diet: Regular/House Diet, Diabetic Diets; Consistent Carbohydrate; Texture: Soft to Chew (NDD 3); Soft to Chew: Chopped Meat; Fluid Consistency: Thin (IDDSI 0)        Labs:    Results from last 7 days   Lab Units 12/18/23  0249 12/16/23  1936   WBC 10*3/mm3 8.30 8.90   HEMOGLOBIN g/dL 13.8 15.0   HEMATOCRIT % 42.1 45.5   PLATELETS 10*3/mm3 328 376       Results from last 7 days   Lab Units 12/18/23  0249 12/16/23  1936   SODIUM mmol/L 142 139   POTASSIUM mmol/L 3.0* 3.6   CHLORIDE mmol/L 100 97*   CO2 mmol/L 29.0 21.0*   BUN mg/dL 33* 29*   CREATININE mg/dL 1.24* 1.28*                  Discharge Medications        New Medications        Instructions Start Date   apixaban 5 MG tablet tablet  Commonly known as: ELIQUIS   5 mg, Oral, Every 12 Hours Scheduled             Continue These Medications        Instructions Start Date   ALPRAZolam 0.25 MG tablet  Commonly known as: XANAX   0.125 mg, Oral, 2 Times Daily      aspirin 81 MG chewable tablet   81 mg, Oral, Daily      B-D UF III MINI PEN NEEDLES 31G X 5 MM misc  Generic drug: Insulin Pen Needle   1 Piece, Does not apply, Daily      Pen Needles 32G X 4 MM misc   1 each, Does not apply, 4 Times Daily Before Meals  & Nightly      carvedilol 6.25 MG tablet  Commonly known as: COREG   6.25 mg, Oral, 2 Times Daily With Meals      CENTRUM FRESH/FRUITY 50+ PO   Oral      cetirizine 10 MG tablet  Commonly known as: zyrTEC   TAKE 1 TABLET DAILY      cyclobenzaprine 10 MG tablet  Commonly known as: FLEXERIL   10 mg, Oral, 3 Times Daily PRN      estrogens (conjugated) 1.25 MG tablet  Commonly known as: PREMARIN   1.25 mg, Oral, Daily      Fluticasone-Salmeterol 250-50 MCG/ACT DISKUS  Commonly known as: ADVAIR/WIXELA   1 puff, Inhalation, 2 Times Daily - RT      HumaLOG KwikPen 100 UNIT/ML solution pen-injector  Generic drug: Insulin Lispro (1 Unit Dial)   10 Units, Subcutaneous, Daily With Breakfast, Lunch & Dinner      Insulin Lispro (1 Unit Dial) 100 UNIT/ML solution pen-injector  Commonly known as: HumaLOG KwikPen   10 Units, Subcutaneous, 3 Times Daily Before Meals      Humira Pen 40 MG/0.8ML Pen-injector Kit  Generic drug: Adalimumab   40 mg, Subcutaneous, Every 14 Days      Lantus SoloStar 100 UNIT/ML injection pen  Generic drug: Insulin Glargine   30 Units, Subcutaneous, Nightly      levothyroxine 50 MCG tablet  Commonly known as: SYNTHROID, LEVOTHROID   50 mcg, Oral, Daily      meclizine 25 MG tablet  Commonly known as: ANTIVERT   TAKE 1 TABLET THREE TIMES A DAY AS NEEDED FOR DIZZINESS      montelukast 10 MG tablet  Commonly known as: SINGULAIR   10 mg, Oral, Nightly      nitroglycerin 0.4 MG SL tablet  Commonly known as: NITROSTAT   1 under the tongue as needed for angina, may repeat q5mins for up three doses      ondansetron ODT 8 MG disintegrating tablet  Commonly known as: ZOFRAN-ODT   PLACE 1 TABLET ON THE TONGUE EVERY 8 HOURS AS NEEDED FOR NAUSEA OR VOMITING      pantoprazole 40 MG EC tablet  Commonly known as: PROTONIX   40 mg, Oral, Daily      PARoxetine 20 MG tablet  Commonly known as: PAXIL   20 mg, Oral, Daily      potassium chloride 20 MEQ CR tablet  Commonly known as: K-DUR,KLOR-CON   TAKE 1 TABLET DAILY       ProAir  (90 Base) MCG/ACT inhaler  Generic drug: albuterol sulfate HFA   2 puffs, Inhalation, Every 4 Hours PRN      rosuvastatin 20 MG tablet  Commonly known as: CRESTOR   TAKE 1 TABLET DAILY      Synjardy XR  MG tablet sustained-release 24 hour  Generic drug: Empagliflozin-metFORMIN HCl ER   1 tablet, Oral, Daily      topiramate 25 MG tablet  Commonly known as: TOPAMAX   25 mg, Oral, 2 Times Daily      triamterene-hydrochlorothiazide 75-50 MG per tablet  Commonly known as: MAXZIDE   TAKE 1 TABLET DAILY      Turmeric 500 MG capsule   Oral             Stop These Medications      digoxin 250 MCG tablet  Commonly known as: LANOXIN     ticagrelor 90 MG tablet tablet  Commonly known as: MARTIN               Spent at least 35 minutes in the management of patient's care including but not limited to physical exam, review of vital signs, labs, cultures and imaging studies, discussing the hospital stay along with plan of care at home, preparation and coordinating of discharge, arranging follow up care and referrals as indicated. Plan also discussed with RN.    Dedra Marrero MD  Hospitalist  12/18/23   15:59 EST

## 2023-12-18 NOTE — PLAN OF CARE
"Goal Outcome Evaluation:              Outcome Evaluation: Pt is a 72 y/o female with PMH of previous strokes.  Presented to hospital secondary to dizziness, nausea and vomiting, facial numbness.  Pt seen on 12/1-12/4 in hospital with acute L pontine stroke- symptoms resolved.  CT head: (-) acute, Chest X-ray: (-) acute.  MRI brain: (-) acute, encephalomalacia from prior infarct R parietal occipital lobe.   Pt reports she lives alone in an RV with ramp present.  Pt independent with all functional mobility tasks and \"sometimes ambulates with a st cane if needed\".  Pt on room air and telemetry this date sitting up in b/s chair.  Pt was mod I for transfers, bed moblity and ambulated 180' without an assistive device with mod I .  Pt with wide JULIO and decreased gait speed.  Pt veers to L side when ambulating in hallway.  Pt is at baseline level for all mobility tasks and has no skilled PT needs at this time.  Will d/c from inpt PT services.      Anticipated Discharge Disposition (PT): home  "

## 2023-12-18 NOTE — PROGRESS NOTES
Patient doing very well    MRI reviewed, no new stroke    Because of cardiac reasons, anticoagulation requested and that is okay per neurology as she does not have any large volume stroke    Aspirin 81mg plus what ever anticoagulation  Modification of stroke risk factors:   - Blood pressure should be less than 130/80 outpatient, HbA1c less than 6.5, LDL less than 70; b12>500 and smoking cessation if applicable. We would be grateful if the primary team / primary care physician keep a close watch on this above targets.  - Stroke education  - Follow up with neurologist of choice      Call if needed

## 2023-12-18 NOTE — CONSULTS
Referring Provider: Dedra Marrero MD    Reason for Consultation:  paroxysmal atrial fibrillation with recent CVA input on initiation of anticoagulation       Patient Care Team:  Provider, No Known as PCP - General      SUBJECTIVE     Chief Complaint:  stroke-like symptoms    History of present illness:  Lula Burnett is a 73 y.o. female with a history of strokes, paroxysmal atrial fibrillation, hypothyroidism, hypertension, hyperlipidemia, and Type 2 diabetes mellitus who presented to the ER at [facility] on 12/16/2023 complaining of headache, dizziness, and facial numbness. Upon arrival to the ER a Code Stroke was initiated. Since the patient had a recent CVA TNK was contraindicated. Neurology was consulted and she was admitted. Cardiology was consulted for recommendation on anticoagulation for paroxysmal atrial fibrillation.     She currently denies any chest pain, SOA, Palpiations. Says she has had afib ever since haviing her son.  Currently in NSR. Echo reviewed:      Results for orders placed during the hospital encounter of 12/01/23    Adult Transthoracic Echo Complete W/ Cont if Necessary Per Protocol (With Agitated Saline)    Interpretation Summary    Left ventricular ejection fraction appears to be 56 - 60%.    Saline test results are negative.    There is calcification of the aortic valve.       Review of systems:    REVIEW OF SYSTEMS:    Constitutional: No weakness,fatigue, fever, rigors, chills   Eyes: No vision changes, eye pain   ENT/oropharynx: No difficulty swallowing, sore throat, epistaxis, changes in hearing   Cardiovascular: No chest pain, chest tightness, palpitations, paroxysmal nocturnal dyspnea, orthopnea, diaphoresis, dizziness / syncopal episode   Respiratory: No shortness of breath, dyspnea on exertion, cough, wheezing hemoptysis   Gastrointestinal: No abdominal pain, nausea, vomiting, diarrhea, bloody stools   Genitourinary: No hematuria, dysuria   Neurological: No  headache, tremors, numbness,  one-sided weakness    Musculoskeletal: No cramps, myalgias,  joint pain, joint swelling   Integument: No rash, edema         Personal History:      Past Medical History:   Diagnosis Date    Anxiety     Asthma     COVID-19     Diagnosed with positive test 8/4/22.    CVA (cerebral vascular accident)     x3    Depression     Diabetes     Diabetic neuropathy     Former smoker     GERD (gastroesophageal reflux disease)     Hypertension     Hypothyroidism     Mitral stenosis     Mitral valve annular calcification     Peptic ulceration     PONV (postoperative nausea and vomiting)     Psoriasis     Vertigo        Past Surgical History:   Procedure Laterality Date    CARDIAC CATHETERIZATION      CARDIAC CATHETERIZATION N/A 02/07/2022    Procedure: Coronary angiography;  Surgeon: Eugene Lundy MD;  Location:  [facility] CATH INVASIVE LOCATION;  Service: Cardiovascular;  Laterality: N/A;    CARDIAC CATHETERIZATION N/A 02/07/2022    Procedure: Left heart cath;  Surgeon: Eugene Lundy MD;  Location:  [facility] C[facility] INVASIVE LOCATION;  Service: Cardiovascular;  Laterality: N/A;    CARDIAC CATHETERIZATION N/A 02/07/2022    Procedure: Left ventriculography;  Surgeon: Eugene Lundy MD;  Location: [facility] C[facility]VASIVE LOC[facility];  Service: Cardiovascular;  Laterality: N/A;    CARDIAC ELECTROPHYSIOLOGY PROCEDURE N/A 02/07/2022    Procedure: LOOP INSERTION linq;  Surgeon: Eugene Lundy MD;  Location: [facility] C[facility];  Service: Cardiovascular;  Laterality: N/A;    CHOLECYSTECTOMY  02/2000    HYSTERECTOMY  1980       Family History   Problem Relation Age of Onset    Heart failure Mother     Lung cancer Father     Stroke Sister     Breast cancer Neg Hx     Ovarian cancer Neg Hx        Social History     Tobacco Use    Smoking status: Former     Packs/day: 1.00     Years: 20.00     Additional pack years: 0.00     Total pack years: 20.00     Types: Cigarettes     Start date: 1978      Quit date: 2011     Years since quittin.8    Smokeless tobacco: Never    Tobacco comments:     Quit around    Vaping Use    Vaping Use: Never used   Substance Use Topics    Alcohol use: Never    Drug use: Never        Home meds:  Prior to Admission medications    Medication Sig Start Date End Date Taking? Authorizing Provider   Adalimumab (Humira Pen) 40 MG/0.8ML Pen-injector Kit Inject 40 mg under the skin into the appropriate area as directed Every 14 (Fourteen) Days. 23  Yes Val Irvin MD   ALPRAZolam (XANAX) 0.25 MG tablet Take 0.5 tablets by mouth 2 (Two) Times a Day. 23  Yes Val Irvin MD   aspirin 81 MG chewable tablet Chew 1 tablet Daily. 23  Yes Hao Christiansen MD   carvedilol (COREG) 6.25 MG tablet Take 1 tablet by mouth 2 (Two) Times a Day With Meals. 23  Yes Val Irvin MD   cetirizine (zyrTEC) 10 MG tablet TAKE 1 TABLET DAILY 3/28/23  Yes Val Irvin MD   cyclobenzaprine (FLEXERIL) 10 MG tablet Take 1 tablet by mouth 3 (Three) Times a Day As Needed for Muscle Spasms. 23  Yes Val Irvin MD   digoxin (LANOXIN) 250 MCG tablet Take 1 tablet by mouth Daily. 1 tablet daily 23  Yes Hao Christiansen MD   estrogens, conjugated, (PREMARIN) 1.25 MG tablet Take 1 tablet by mouth Daily. 23  Yes Val Irvin MD   Fluticasone-Salmeterol (ADVAIR/WIXELA) 250-50 MCG/ACT DISKUS Inhale 1 puff 2 (Two) Times a Day. 23  Yes Val Irvin MD   Insulin Glargine (Lantus SoloStar) 100 UNIT/ML injection pen Inject 30 Units under the skin into the appropriate area as directed Every Night. 23  Yes Hao Christiansen MD   Insulin Lispro, 1 Unit Dial, (HumaLOG KwikPen) 100 UNIT/ML solution pen-injector Inject 10 Units under the skin into the appropriate area as directed Daily With Breakfast, Lunch & Dinner. 23  Yes Hao Christiansen MD   Insulin Lispro, 1 Unit Dial, (HumaLOG  KwikPen) 100 UNIT/ML solution pen-injector Inject 10 Units under the skin into the appropriate area as directed 3 (Three) Times a Day Before Meals. 12/5/23  Yes Val Irvin MD   levothyroxine (SYNTHROID, LEVOTHROID) 50 MCG tablet TAKE 1 TABLET BY MOUTH DAILY 7/24/23  Yes Val Irvin MD   meclizine (ANTIVERT) 25 MG tablet TAKE 1 TABLET THREE TIMES A DAY AS NEEDED FOR DIZZINESS 9/22/23  Yes Val Irvin MD   montelukast (SINGULAIR) 10 MG tablet Take 1 tablet by mouth Every Night. 1/24/23  Yes Val Irvin MD   Multiple Vitamins-Minerals (CENTRUM FRESH/FRUITY 50+ PO) Take  by mouth.   Yes Rey Tucker MD   ondansetron ODT (ZOFRAN-ODT) 8 MG disintegrating tablet PLACE 1 TABLET ON THE TONGUE EVERY 8 HOURS AS NEEDED FOR NAUSEA OR VOMITING 2/10/23  Yes Val Irvin MD   pantoprazole (PROTONIX) 40 MG EC tablet Take 1 tablet by mouth Daily. 1/24/23  Yes Val Irvin MD   PARoxetine (PAXIL) 20 MG tablet Take 1 tablet by mouth Daily. 1/24/23  Yes Val Irvin MD   potassium chloride (K-DUR,KLOR-CON) 20 MEQ CR tablet TAKE 1 TABLET DAILY 10/3/23  Yes Val Irvin MD   ProAir  (90 Base) MCG/ACT inhaler Inhale 2 puffs Every 4 (Four) Hours As Needed for Wheezing or Shortness of Air. 1/24/23  Yes Val Irvin MD   rosuvastatin (CRESTOR) 20 MG tablet TAKE 1 TABLET DAILY 7/5/23  Yes Val Irvin MD   Synjardy XR  MG tablet sustained-release 24 hour Take 1 tablet by mouth Daily. 1/24/23  Yes Val Irvin MD   ticagrelor (BRILINTA) 90 MG tablet tablet Take 1 tablet by mouth 2 (Two) Times a Day. 12/4/23  Yes Hao Christiansen MD   topiramate (TOPAMAX) 25 MG tablet Take 1 tablet by mouth 2 (Two) Times a Day. 1/24/23  Yes Val Irvin MD   triamterene-hydrochlorothiazide (MAXZIDE) 75-50 MG per tablet TAKE 1 TABLET DAILY 4/7/23  Yes Val Irvin MD  "  Turmeric 500 MG capsule Take  by mouth.   Yes Provider, MD Rey   Insulin Pen Needle (B-D UF III MINI PEN NEEDLES) 31G X 5 MM misc 1 Piece Daily. 4/10/23   Val Irvin MD   Insulin Pen Needle (Pen Needles) 32G X 4 MM misc Use 1 each 4 (Four) Times a Day Before Meals & at Bedtime. 12/4/23   Hao Christiansen MD   nitroglycerin (NITROSTAT) 0.4 MG SL tablet 1 under the tongue as needed for angina, may repeat q5mins for up three doses 1/28/22   Gumaro Hancock MD       Allergies:     Codeine    Scheduled Meds:insulin glargine, 1-200 Units, Subcutaneous, Nightly - Glucommander  insulin lispro, 1-200 Units, Subcutaneous, 4x Daily With Meals & Nightly  sodium chloride, 10 mL, Intravenous, Q12H      Continuous Infusions:   PRN Meds:  dextrose    dextrose    glucagon (human recombinant)    hydrALAZINE    insulin lispro    ondansetron    [COMPLETED] Insert Peripheral IV **AND** sodium chloride    sodium chloride    sodium chloride      OBJECTIVE    Vital Signs  Vitals:    12/18/23 0007 12/18/23 0100 12/18/23 0342 12/18/23 0751   BP: 168/74 144/76 144/71 154/74   BP Location: Right arm  Right arm Right arm   Patient Position: Lying  Lying Lying   Pulse: 73 72 70 68   Resp: 14  15 14   Temp: 98.4 °F (36.9 °C)  97.9 °F (36.6 °C) 97.7 °F (36.5 °C)   TempSrc: Axillary  Axillary Oral   SpO2: 93% 93% 95% 94%   Weight:   71.7 kg (158 lb 1.1 oz)    Height:           Flowsheet Rows      Flowsheet Row First Filed Value   Admission Height 157.5 cm (62\") Documented at 12/16/2023 1929   Admission Weight 73.9 kg (163 lb) Documented at 12/16/2023 1929              Intake/Output Summary (Last 24 hours) at 12/18/2023 0849  Last data filed at 12/17/2023 1924  Gross per 24 hour   Intake 480 ml   Output --   Net 480 ml        Telemetry:  NSR    Physical Exam:  The patient is alert, oriented and in no distress.  Vital signs as noted above.  Head and neck revealed no carotid bruits or jugular venous distention.  No " thyromegaly or lymph adenopathy is present  Lungs clear.  No wheezing.  Breath sounds are normal bilaterally.  Heart normal first and second heart sounds.No murmur.  No precordial rub is present.  No gallop is present.  Abdomen soft and nontender.  No organomegaly is present.  Extremities with good peripheral pulses without any pedal edema.  Skin warm and dry.  Musculoskeletal system is grossly normal  CNS grossly normal        Results Review:  I have personally reviewed the results from the time of this admission to 12/18/2023 08:49 EST and agree with these findings:  [x]  Laboratory  []  Microbiology  [x]  Radiology  [x]  EKG/Telemetry   [x]  Cardiology/Vascular   []  Pathology  [x]  Old records  []  Other:    Most notable findings include:     Lab Results (last 24 hours)       Procedure Component Value Units Date/Time    POC Glucose Once [[id number]]  (Abnormal) Collected: 12/18/23 0742    Specimen: Blood Updated: 12/18/23 0743     Glucose 126 mg/dL      Comment: Serial Number: [id number]:  [id number]       Magnesium [[id number]]  (Abnormal) Collected: 12/18/23 0249    Specimen: Blood Updated: 12/18/23 0346     Magnesium 2.5 mg/dL     Basic Metabolic Panel [[id number]]  (Abnormal) Collected: 12/18/23 0249    Specimen: Blood Updated: 12/18/23 0346     Glucose 115 mg/dL      BUN 33 mg/dL      Creatinine 1.24 mg/dL      Sodium 142 mmol/L      Potassium 3.0 mmol/L      Chloride 100 mmol/L      CO2 29.0 mmol/L      Calcium 9.6 mg/dL      BUN/Creatinine Ratio 26.6     Anion Gap 13.0 mmol/L      eGFR 45.5 mL/min/1.73     Narrative:      GFR Normal >60  Chronic Kidney Disease <60  Kidney Failure <15    The GFR formula is only valid for adults with stable renal function between ages 18 and 70.    CBC & Differential [[id number]]  (Abnormal) Collected: 12/18/23 0249    Specimen: Blood Updated: 12/18/23 0325    Narrative:      The following orders were created for panel order CBC & Differential.  Procedure                                Abnormality         Status                     ---------                               -----------         ------                     CBC Auto Differential[[id number]]        Abnormal            Final result                 Please view results for these tests on the individual orders.    CBC Auto Differential [[id number]]  (Abnormal) Collected: 12/18/23 0249    Specimen: Blood Updated: 12/18/23 0325     WBC 8.30 10*3/mm3      RBC 4.81 10*6/mm3      Hemoglobin 13.8 g/dL      Hematocrit 42.1 %      MCV 87.4 fL      MCH 28.7 pg      MCHC 32.8 g/dL      RDW 16.8 %      RDW-SD 53.8 fl      MPV 8.0 fL      Platelets 328 10*3/mm3      Neutrophil % 46.2 %      Lymphocyte % 46.1 %      Monocyte % 5.7 %      Eosinophil % 1.6 %      Basophil % 0.4 %      Neutrophils, Absolute 3.80 10*3/mm3      Lymphocytes, Absolute 3.80 10*3/mm3      Monocytes, Absolute 0.50 10*3/mm3      Eosinophils, Absolute 0.10 10*3/mm3      Basophils, Absolute 0.00 10*3/mm3      nRBC 0.0 /100 WBC     POC Glucose Once [[id number]]  (Abnormal) Collected: 12/17/23 2011    Specimen: Blood Updated: 12/17/23 2013     Glucose 264 mg/dL      Comment: Serial Number: [id number]:  [id number]       POC Glucose Once [[id number]]  (Abnormal) Collected: 12/17/23 1642    Specimen: Blood Updated: 12/17/23 1644     Glucose 194 mg/dL      Comment: Serial Number: [id number]:  [id number]       POC Glucose Once [[id number]]  (Abnormal) Collected: 12/17/23 1121    Specimen: Blood Updated: 12/17/23 1123     Glucose 174 mg/dL      Comment: Serial Number: [id number]:  [id number]               Imaging Results (Last 24 Hours)       Procedure Component Value Units Date/Time    MRI Brain Without Contrast [[id number]] Collected: 12/17/23 1616     Updated: 12/17/23 1625    Narrative:      MRI BRAIN WO CONTRAST    Date of Exam: 12/17/2023 2:30 PM CST    Indication: Stroke, follow up.     Comparison: CT perfusion 12/16/2023 and MRI brain 12/1/2023  2023    Technique:  Routine multiplanar/multisequence sequence images of the brain were obtained without contrast administration.      Findings:  There is no diffusion restriction to suggest acute infarct.     There is encephalomalacia in the right parietal occipital region compatible with previous infarct. Mild periventricular white matter changes are noted. There are scattered deep white matter high T2 signal foci compatible with small vessel disease.    No mass effect or midline shift. No abnormal extra-axial collections. There is hemosiderin deposit within the right basal ganglia unchanged.    The basal ganglia, brainstem and cerebellum appear within normal limits. Midline structures are intact. No significant cortical abnormality is identified.    Calvarial and superficial soft tissue signal is within normal limits. Orbits appear unremarkable. The paranasal sinuses and the mastoid air cells appear well aerated.         Impression:      Impression:    1. Stable examination without evidence for acute ischemic event.  2. Encephalomalacia from prior infarct in the right parietal occipital lobe.  3. Small vessel disease.        Electronically Signed: Priya Todd MD    12/17/2023 3:22 PM CST    Workstation ID: [id number]            LAB RESULTS (LAST 7 DAYS)    CBC  Results from last 7 days   Lab Units 12/18/23  0249 12/16/23 1936   WBC 10*3/mm3 8.30 8.90   RBC 10*6/mm3 4.81 5.21   HEMOGLOBIN g/dL 13.8 15.0   HEMATOCRIT % 42.1 45.5   MCV fL 87.4 87.4   PLATELETS 10*3/mm3 328 376       BMP  Results from last 7 days   Lab Units 12/18/23 0249 12/16/23 1936   SODIUM mmol/L 142 139   POTASSIUM mmol/L 3.0* 3.6   CHLORIDE mmol/L 100 97*   CO2 mmol/L 29.0 21.0*   BUN mg/dL 33* 29*   CREATININE mg/dL 1.24* 1.28*   GLUCOSE mg/dL 115* 224*   MAGNESIUM mg/dL 2.5*  --        CMP   Results from last 7 days   Lab Units 12/18/23 0249 12/16/23 1936   SODIUM mmol/L 142 139   POTASSIUM mmol/L 3.0* 3.6   CHLORIDE mmol/L 100 97*    CO2 mmol/L 29.0 21.0*   BUN mg/dL 33* 29*   CREATININE mg/dL 1.24* 1.28*   GLUCOSE mg/dL 115* 224*   ALBUMIN g/dL  --  4.6   BILIRUBIN mg/dL  --  0.5   ALK PHOS U/L  --  99   AST (SGOT) U/L  --  58*   ALT (SGPT) U/L  --  89*       BNP        TROPONIN        CoAg  Results from last 7 days   Lab Units 12/16/23  1936   INR  0.94   APTT seconds 26.1*       Creatinine Clearance  Estimated Creatinine Clearance: 36.3 mL/min (A) (by C-G formula based on SCr of 1.24 mg/dL (H)).    ABG          Radiology  MRI Brain Without Contrast    Result Date: 12/17/2023  Impression: 1. Stable examination without evidence for acute ischemic event. 2. Encephalomalacia from prior infarct in the right parietal occipital lobe. 3. Small vessel disease. Electronically Signed: Priya Todd MD  12/17/2023 3:22 PM CST  Workstation ID: [id number]    CT Angiogram Neck    Result Date: 12/16/2023  Impression: Diminutive appearance of the anterior cerebral arteries bilaterally, right greater than left, similar as compared to the previous study. Diminutive appearance of the right PCA which appears similar as compared to the previous study. No large vessel occlusion identified. No additional evidence of hemodynamically significant stenosis, AVM or aneurysm within the head or neck by NASCET criteria. Electronically Signed: Carly Meredith MD  12/16/2023 8:26 PM EST  Workstation ID: [id number]    CT Angiogram Head w AI Analysis of LVO    Result Date: 12/16/2023  Impression: Diminutive appearance of the anterior cerebral arteries bilaterally, right greater than left, similar as compared to the previous study. Diminutive appearance of the right PCA which appears similar as compared to the previous study. No large vessel occlusion identified. No additional evidence of hemodynamically significant stenosis, AVM or aneurysm within the head or neck by NASCET criteria. Electronically Signed: Carly Meredith MD  12/16/2023 8:26 PM EST  Workstation ID: [id number]    CT  CEREBRAL PERFUSION WITH & WITHOUT CONTRAST    Addendum Date: 12/16/2023    ADDENDUM #1 The perfusion abnormality seen within the right parietal and temporal lobes is related to the recent infarct as seen on MRI 12/1/2023. Acute on chronic infarct cannot be excluded particularly within the paramedian right frontal and parietal lobes which may be related to underlying anterior cerebral artery irregularity and diminutive appearance which was present on CT angiogram 12/2/2023. Again MRI evaluation may be warranted to evaluate for new areas of infarction. Electronically Signed: Carly Meredith MD  12/16/2023 8:18 PM EST  Workstation ID: [id number] ORIGINAL REPORT: CT CEREBRAL PERFUSION W WO CONTRAST Date of Exam: 12/16/2023 8:00 PM EST Indication: Neuro deficit, acute, stroke suspected.  Comparison: None available. Technique: Axial CT images of the brain were obtained prior to and after the administration of iodinated contrast. CT Perfusion protocol was utilized. Automated post processing was performed by RAPID software and submitted to PACS for interpretation. Automated exposure control and iterative reconstruction was utilized. Findings: The cerebral blood flow less than 30% is 19 mL. Tmax greater than 6 seconds is 42 mL. Mismatch volume is 23 mL. Mismatch ratio is 2.2. Perfusion defect present within the right temporal lobe extending to the right parietal lobe. Questionable mild perfusion defect seen within the paramedian right frontal and parietal lobes. Impression: 1.Perfusion defect within the right temporal lobe extending to the right parietal lobe with a mismatch volume of 23 mL. Mismatch ratio is 2.2. Given the degree of hypodensity present, this is age-indeterminate. 2.Questionable mild perfusion defect seen within the paramedian right frontal and parietal lobes. MRI evaluation may be warranted. Electronically Signed: Carly Meredith MD  12/16/2023 8:14 PM EST  Workstation ID: [id number]    Result Date:  12/16/2023  Impression: 1.Perfusion defect within the right temporal lobe extending to the right parietal lobe with a mismatch volume of 23 mL. Mismatch ratio is 2.2. Given the degree of hypodensity present, this is age-indeterminate. 2.Questionable mild perfusion defect seen within the paramedian right frontal and parietal lobes. MRI evaluation may be warranted. Electronically Signed: Carly Meredith MD  12/16/2023 8:14 PM EST  Workstation ID: [id number]    XR Chest 1 View    Result Date: 12/16/2023  Impression: No acute cardiopulmonary process. Electronically Signed: Carly Meredith MD  12/16/2023 8:08 PM EST  Workstation ID: [id number]    CT Head Without Contrast Stroke Protocol    Result Date: 12/16/2023  Impression: No acute intracranial process identified. Electronically Signed: Dashawn Todd MD  12/16/2023 6:57 PM CST  Workstation ID: [id number]       EKG  I personally viewed and interpreted the patient's EKG/Telemetry data:  ECG 12 Lead Other; Dizziness   Final Result   HEART RATE= 75  bpm   RR Interval= 804  ms   WI Interval= 174  ms   P Horizontal Axis= -17  deg   P Front Axis= 73  deg   QRSD Interval= 86  ms   QT Interval= 394  ms   QTcB= 439  ms   QRS Axis= -44  deg   T Wave Axis= 194  deg   - ABNORMAL ECG -   Sinus rhythm   LVH with secondary repolarization abnormality   When compared with ECG of 01-Dec-2023 18:09:17,   Nonspecific significant change   Electronically Signed By: Patrick Lubin ([facility]) 17-Dec-2023 07:19:06   Date and Time of Study: 2023-12-16 19:37:33      SCANNED - TELEMETRY     Final Result            Echocardiogram:    Results for orders placed during the hospital encounter of 12/01/23    Adult Transthoracic Echo Complete W/ Cont if Necessary Per Protocol (With Agitated Saline)    Interpretation Summary    Left ventricular ejection fraction appears to be 56 - 60%.    Saline test results are negative.    There is calcification of the aortic valve.        Stress Test:        Cardiac  Catheterization:  Results for orders placed during the hospital encounter of 02/07/22    Cardiac Catheterization/Vascular Study    Narrative  1.  Normal coronary arteries  2.  Hyperdynamic left ventricular systolic function  3.  Mitral annular calcification        Other:      ASSESSMENT & PLAN:    Principal Problem:    Facial numbness  Active Problems:    Type 2 diabetes mellitus with diabetic neuropathy, with long-term current use of insulin    Primary hypertension    Hypothyroidism    Mixed hyperlipidemia    Overweight (BMI 25.0-29.9)    Paroxysmal atrial fibrillation    History of CVA (cerebrovascular accident)      Paroxysmal Afib  Loop recorder in place  Continue BB  Discontinue dig  CHADSVASC score of 7  Unclear why she has never been on anticoagulation  Start eliquis 5 mg bid    History of CVA  Facial numbness  Recent acute CVA  MRI from this admission did not show acute CVA  Continue ASA  Stop brillinta to avoid triple therapy  Continue statin    HLD  Continue statin  Goal LDL <70    HTN  Continue coreg        Electronically signed by Yaritza Pereira MD, 12/18/23, 6:28 PM EST.

## 2023-12-18 NOTE — PLAN OF CARE
Problem: Adult Inpatient Plan of Care  Goal: Plan of Care Review  Outcome: Met  Goal: Patient-Specific Goal (Individualized)  Outcome: Met  Goal: Absence of Hospital-Acquired Illness or Injury  Outcome: Met  Intervention: Identify and Manage Fall Risk  Recent Flowsheet Documentation  Taken 12/18/2023 1200 by Cristin Sen RN  Safety Promotion/Fall Prevention:   assistive device/personal items within reach   activity supervised   clutter free environment maintained   fall prevention program maintained   nonskid shoes/slippers when out of bed   safety round/check completed   room organization consistent  Taken 12/18/2023 1000 by Cristin Sen RN  Safety Promotion/Fall Prevention:   activity supervised   assistive device/personal items within reach   clutter free environment maintained   fall prevention program maintained   nonskid shoes/slippers when out of bed   room organization consistent   safety round/check completed  Taken 12/18/2023 0800 by Cristin Sen RN  Safety Promotion/Fall Prevention:   assistive device/personal items within reach   activity supervised   clutter free environment maintained   fall prevention program maintained   nonskid shoes/slippers when out of bed   room organization consistent   safety round/check completed  Intervention: Prevent Skin Injury  Recent Flowsheet Documentation  Taken 12/18/2023 1200 by Cristin Sen RN  Body Position: position changed independently  Skin Protection:   adhesive use limited   tubing/devices free from skin contact  Taken 12/18/2023 1000 by Cristin Sen RN  Body Position: position changed independently  Taken 12/18/2023 0800 by Cristin Sen RN  Body Position: position changed independently  Skin Protection:   adhesive use limited   tubing/devices free from skin contact  Intervention: Prevent and Manage VTE (Venous Thromboembolism) Risk  Recent Flowsheet Documentation  Taken 12/18/2023 1200 by Cristin Sen RN  Activity Management: up  in chair  VTE Prevention/Management:   bilateral   sequential compression devices off   patient refused intervention  Range of Motion: active ROM (range of motion) encouraged  Taken 12/18/2023 1000 by Cristin Sen RN  Activity Management: up in chair  Taken 12/18/2023 0800 by Cristin Sen RN  Activity Management: activity encouraged  Range of Motion: active ROM (range of motion) encouraged  Intervention: Prevent Infection  Recent Flowsheet Documentation  Taken 12/18/2023 1200 by Cristin Sen RN  Infection Prevention:   environmental surveillance performed   equipment surfaces disinfected   hand hygiene promoted   personal protective equipment utilized   single patient room provided  Taken 12/18/2023 1000 by Cristin Sen RN  Infection Prevention:   environmental surveillance performed   equipment surfaces disinfected   hand hygiene promoted   personal protective equipment utilized   single patient room provided  Taken 12/18/2023 0800 by Cristin Sen RN  Infection Prevention:   environmental surveillance performed   equipment surfaces disinfected   hand hygiene promoted   personal protective equipment utilized   single patient room provided  Goal: Optimal Comfort and Wellbeing  Outcome: Met  Intervention: Monitor Pain and Promote Comfort  Recent Flowsheet Documentation  Taken 12/18/2023 1200 by Cristin Sen RN  Pain Management Interventions:   care clustered   quiet environment facilitated   no interventions per patient request  Taken 12/18/2023 0800 by Cristin eSn RN  Pain Management Interventions:   care clustered   quiet environment facilitated   no interventions per patient request  Intervention: Provide Person-Centered Care  Recent Flowsheet Documentation  Taken 12/18/2023 1200 by Cristin Sen RN  Trust Relationship/Rapport:   choices provided   care explained   emotional support provided   empathic listening provided   questions answered   questions encouraged   thoughts/feelings  acknowledged  Taken 12/18/2023 0800 by Cristin Sen RN  Trust Relationship/Rapport:   choices provided   care explained   emotional support provided   empathic listening provided   questions answered   questions encouraged   thoughts/feelings acknowledged  Goal: Readiness for Transition of Care  Outcome: Met     Problem: Asthma Comorbidity  Goal: Maintenance of Asthma Control  Outcome: Met  Intervention: Maintain Asthma Symptom Control  Recent Flowsheet Documentation  Taken 12/18/2023 1000 by Cristin Sen RN  Medication Review/Management: medications reviewed  Taken 12/18/2023 0800 by Cristin Sen RN  Medication Review/Management: medications reviewed     Problem: Diabetes Comorbidity  Goal: Blood Glucose Level Within Targeted Range  Outcome: Met  Intervention: Monitor and Manage Glycemia  Recent Flowsheet Documentation  Taken 12/18/2023 1200 by Cristin Sen RN  Glycemic Management: blood glucose monitored  Taken 12/18/2023 0800 by Cristin Sen RN  Glycemic Management: blood glucose monitored     Problem: Adjustment to Illness (Stroke, Ischemic/Transient Ischemic Attack)  Goal: Optimal Coping  Outcome: Met  Intervention: Support Psychosocial Response to Stroke  Recent Flowsheet Documentation  Taken 12/18/2023 1200 by Cristin Sen RN  Family/Support System Care:   self-care encouraged   support provided  Taken 12/18/2023 0800 by Cristin Sen RN  Family/Support System Care:   self-care encouraged   support provided     Problem: Bowel Elimination Impaired (Stroke, Ischemic/Transient Ischemic Attack)  Goal: Effective Bowel Elimination  Outcome: Met     Problem: Cerebral Tissue Perfusion (Stroke, Ischemic/Transient Ischemic Attack)  Goal: Optimal Cerebral Tissue Perfusion  Outcome: Met  Intervention: Protect and Optimize Cerebral Perfusion  Recent Flowsheet Documentation  Taken 12/18/2023 1200 by Cristin Sen RN  Sensory Stimulation Regulation:   care clustered   quiet environment  promoted  Cerebral Perfusion Promotion: blood pressure monitored  Taken 12/18/2023 0800 by Cristin Sen RN  Sensory Stimulation Regulation:   care clustered   television on  Cerebral Perfusion Promotion: blood pressure monitored     Problem: Cognitive Impairment (Stroke, Ischemic/Transient Ischemic Attack)  Goal: Optimal Cognitive Function  Outcome: Met  Intervention: Optimize Cognitive Function  Recent Flowsheet Documentation  Taken 12/18/2023 1200 by Cristin Sen RN  Sensory Stimulation Regulation:   care clustered   quiet environment promoted  Reorientation Measures: clock in view  Taken 12/18/2023 0800 by Cristin Sen RN  Sensory Stimulation Regulation:   care clustered   television on  Reorientation Measures: clock in view     Problem: Communication Impairment (Stroke, Ischemic/Transient Ischemic Attack)  Goal: Improved Communication Skills  Outcome: Met  Intervention: Optimize Communication Skills  Recent Flowsheet Documentation  Taken 12/18/2023 1200 by Cristin Sen RN  Communication Enhancement Strategies: call light answered in person  Taken 12/18/2023 0800 by Cristin Sen RN  Communication Enhancement Strategies: call light answered in person     Problem: Functional Ability Impaired (Stroke, Ischemic/Transient Ischemic Attack)  Goal: Optimal Functional Ability  Outcome: Met  Intervention: Optimize Functional Ability  Recent Flowsheet Documentation  Taken 12/18/2023 1200 by Cristin Sen RN  Activity Management: up in chair  Taken 12/18/2023 1000 by Cristin Sen RN  Activity Management: up in chair  Taken 12/18/2023 0800 by Cristin Sen RN  Activity Management: activity encouraged     Problem: Respiratory Compromise (Stroke, Ischemic/Transient Ischemic Attack)  Goal: Effective Oxygenation and Ventilation  Outcome: Met     Problem: Sensorimotor Impairment (Stroke, Ischemic/Transient Ischemic Attack)  Goal: Improved Sensorimotor Function  Outcome: Met  Intervention: Optimize  Range of Motion, Motor Control and Function  Recent Flowsheet Documentation  Taken 12/18/2023 1200 by Cristin Sen RN  Range of Motion: active ROM (range of motion) encouraged  Taken 12/18/2023 0800 by Cristin Sen RN  Range of Motion: active ROM (range of motion) encouraged  Intervention: Optimize Sensory and Perceptual Ability  Recent Flowsheet Documentation  Taken 12/18/2023 1200 by Cristin Sen RN  Pressure Reduction Techniques:   frequent weight shift encouraged   heels elevated off bed   weight shift assistance provided  Taken 12/18/2023 0800 by Cristin Sen RN  Pressure Reduction Techniques:   heels elevated off bed   frequent weight shift encouraged   weight shift assistance provided     Problem: Swallowing Impairment (Stroke, Ischemic/Transient Ischemic Attack)  Goal: Optimal Eating and Swallowing without Aspiration  Outcome: Met  Intervention: Optimize Eating and Swallowing  Recent Flowsheet Documentation  Taken 12/18/2023 0800 by Cristin Sen RN  Feeding/Eating Techniques: rest periods provided     Problem: Urinary Elimination Impaired (Stroke, Ischemic/Transient Ischemic Attack)  Goal: Effective Urinary Elimination  Outcome: Met     Problem: Fall Injury Risk  Goal: Absence of Fall and Fall-Related Injury  Outcome: Met  Intervention: Identify and Manage Contributors  Recent Flowsheet Documentation  Taken 12/18/2023 1000 by Cristin Sen RN  Medication Review/Management: medications reviewed  Taken 12/18/2023 0800 by Cristin Sen RN  Medication Review/Management: medications reviewed  Intervention: Promote Injury-Free Environment  Recent Flowsheet Documentation  Taken 12/18/2023 1200 by Cristin Sen RN  Safety Promotion/Fall Prevention:   assistive device/personal items within reach   activity supervised   clutter free environment maintained   fall prevention program maintained   nonskid shoes/slippers when out of bed   safety round/check completed   room organization  consistent  Taken 12/18/2023 1000 by Cristin Sen, RN  Safety Promotion/Fall Prevention:   activity supervised   assistive device/personal items within reach   clutter free environment maintained   fall prevention program maintained   nonskid shoes/slippers when out of bed   room organization consistent   safety round/check completed  Taken 12/18/2023 0800 by Cristin Sen, RN  Safety Promotion/Fall Prevention:   assistive device/personal items within reach   activity supervised   clutter free environment maintained   fall prevention program maintained   nonskid shoes/slippers when out of bed   room organization consistent   safety round/check completed   Goal Outcome Evaluation:

## 2023-12-19 ENCOUNTER — READMISSION MANAGEMENT (OUTPATIENT)
Dept: CALL CENTER | Facility: HOSPITAL | Age: 73
End: 2023-12-19
Payer: MEDICARE

## 2023-12-19 NOTE — OUTREACH NOTE
Prep Survey      Flowsheet Row Responses   [facility] facility patient discharged from? [facility]   Is LACE score < 7 ? No   Eligibility Readm Mgmt   Discharge diagnosis Facial numbness, hx of cva   Does the patient have one of the following disease processes/diagnoses(primary or secondary)? Other   Does the patient have Home health ordered? No   Is there a DME ordered? No   Comments regarding appointments Schedule an appointment with Yaritza Pereira MD (Interventional Cardiology) in 1 month (1/17/2024)   Medication alerts for this patient see avs for all meds--eliquis   Prep survey completed? Yes            Flor JACOB - Registered Nurse

## 2023-12-19 NOTE — CASE MANAGEMENT/SOCIAL WORK
Case Management Discharge Note      Final Note: Routine Home.         Selected Continued Care - Discharged on 12/18/2023 Admission date: 12/16/2023 - Discharge disposition: Home or Self Care      Destination    No services have been selected for the patient.                   Transportation Services  Private: Car    Final Discharge Disposition Code: 01 - home or self-care

## 2023-12-19 NOTE — TELEPHONE ENCOUNTER
Wanted to know about her dosage of Lispro because they were giving her a different dosage in the hospital  Reason for Disposition   [1] Caller has NON-URGENT medicine question about med that PCP prescribed AND [2] triager unable to answer question    Additional Information   Negative: [1] Intentional drug overdose AND [2] suicidal thoughts or ideas   Negative: Drug overdose and triager unable to answer question   Negative: Caller requesting a renewal or refill of a medicine patient is currently taking   Negative: Caller requesting information unrelated to medicine   Negative: Caller requesting information about COVID-19 Vaccine   Negative: Caller requesting information about Emergency Contraception   Negative: Caller requesting information about Combined Birth Control Pills   Negative: Caller requesting information about Progestin Birth Control Pills   Negative: Caller requesting information about Post-Op pain or medicines   Negative: Caller requesting a prescription antibiotic (such as Penicillin) for Strep throat and has a positive culture result   Negative: Caller requesting a prescription anti-viral med (such as Tamiflu) and has influenza (flu) symptoms   Negative: Immunization reaction suspected   Negative: Rash while taking a medicine or within 3 days of stopping it   Negative: [1] Asthma and [2] having symptoms of asthma (cough, wheezing, etc.)   Negative: [1] Symptom of illness (e.g., headache, abdominal pain, earache, vomiting) AND [2] more than mild   Negative: Breastfeeding questions about mother's medicines and diet   Negative: MORE THAN A DOUBLE DOSE of a prescription or over-the-counter (OTC) drug   Negative: [1] DOUBLE DOSE (an extra dose or lesser amount) of prescription drug AND [2] any symptoms (e.g., dizziness, nausea, pain, sleepiness)   Negative: [1] DOUBLE DOSE (an extra dose or lesser amount) of over-the-counter (OTC) drug AND [2] any symptoms (e.g., dizziness, nausea, pain, sleepiness)    "Negative: Took another person's prescription drug   Negative: [1] DOUBLE DOSE (an extra dose or lesser amount) of prescription drug AND [2] NO symptoms  (Exception: A double dose of antibiotics.)   Negative: Diabetes drug error or overdose (e.g., took wrong type of insulin or took extra dose)   Negative: [1] Prescription not at pharmacy AND [2] was prescribed by PCP recently (Exception: Triager has access to EMR and prescription is recorded there. Go to Home Care and confirm for pharmacy.)   Negative: [1] Pharmacy calling with prescription question AND [2] triager unable to answer question   Negative: [1] Caller has URGENT medicine question about med that PCP or specialist prescribed AND [2] triager unable to answer question   Negative: Medicine patch causing local rash or itching   Negative: [1] Caller has medicine question about med NOT prescribed by PCP AND [2] triager unable to answer question (e.g., compatibility with other med, storage)   Negative: Prescription request for new medicine (not a refill)    Answer Assessment - Initial Assessment Questions  1. NAME of MEDICINE: \"What medicine(s) are you calling about?\"      Lispro  2. QUESTION: \"What is your question?\" (e.g., double dose of medicine, side effect)      Wanted to know about her dosage of Lispro because they were giving her a different dosage in the hospital    3. PRESCRIBER: \"Who prescribed the medicine?\" Reason: if prescribed by specialist, call should be referred to that group.      pcp  4. SYMPTOMS: \"Do you have any symptoms?\" If Yes, ask: \"What symptoms are you having?\"  \"How bad are the symptoms (e.g., mild, moderate, severe)      na  5. PREGNANCY:  \"Is there any chance that you are pregnant?\" \"When was your last menstrual period?\"      na    Protocols used: Medication Question Call-ADULT-  Discussed AVS with patient  "

## 2023-12-20 ENCOUNTER — READMISSION MANAGEMENT (OUTPATIENT)
Dept: CALL CENTER | Facility: HOSPITAL | Age: 73
End: 2023-12-20
Payer: MEDICARE

## 2023-12-20 NOTE — OUTREACH NOTE
Medical Week 1 Survey      Flowsheet Row Responses   [facility] facility patient discharged from? [facility]   Does the patient have one of the following disease processes/diagnoses(primary or secondary)? Other   Week 1 attempt successful? No   Unsuccessful attempts Attempt 1            DENTON MOJICA - Registered Nurse

## 2023-12-27 ENCOUNTER — READMISSION MANAGEMENT (OUTPATIENT)
Dept: CALL CENTER | Facility: HOSPITAL | Age: 73
End: 2023-12-27
Payer: MEDICARE

## 2023-12-27 NOTE — OUTREACH NOTE
Medical Week 1 Survey      Flowsheet Row Responses   [facility] [place] facility patient discharged from? [facility]   Does the patient have one of the following disease processes/diagnoses(primary or secondary)? Other   Week 1 attempt successful? No   Unsuccessful attempts Attempt 2            Shavonne LEMON - Licensed Nurse

## 2024-01-10 ENCOUNTER — READMISSION MANAGEMENT (OUTPATIENT)
Dept: CALL CENTER | Facility: HOSPITAL | Age: 74
End: 2024-01-10
Payer: MEDICARE

## 2024-01-10 NOTE — OUTREACH NOTE
Medical Week 3 Survey      Flowsheet Row Responses   [facility] patient discharged from? [facility]   Does the patient have one of the following disease processes/diagnoses(primary or secondary)? Other   Week 3 attempt successful? No   Unsuccessful attempts Attempt 1   Revoke Decline to participate            Shavonne LEMON - Licensed Nurse

## 2024-02-11 PROBLEM — N39.0 UTI (URINARY TRACT INFECTION), BACTERIAL: Status: RESOLVED | Noted: 2023-12-01 | Resolved: 2024-02-11

## 2024-02-11 PROBLEM — A49.9 UTI (URINARY TRACT INFECTION), BACTERIAL: Status: RESOLVED | Noted: 2023-12-01 | Resolved: 2024-02-11

## 2024-02-13 ENCOUNTER — OFFICE VISIT (OUTPATIENT)
Dept: FAMILY MEDICINE CLINIC | Facility: CLINIC | Age: 74
End: 2024-02-13
Payer: MEDICARE

## 2024-02-13 VITALS
DIASTOLIC BLOOD PRESSURE: 80 MMHG | HEART RATE: 80 BPM | SYSTOLIC BLOOD PRESSURE: 152 MMHG | HEIGHT: 62 IN | TEMPERATURE: 98.6 F | OXYGEN SATURATION: 98 % | WEIGHT: 164.4 LBS | BODY MASS INDEX: 30.25 KG/M2

## 2024-02-13 DIAGNOSIS — I10 PRIMARY HYPERTENSION: Chronic | ICD-10-CM

## 2024-02-13 DIAGNOSIS — E78.2 MIXED HYPERLIPIDEMIA: Chronic | ICD-10-CM

## 2024-02-13 DIAGNOSIS — I48.0 PAROXYSMAL ATRIAL FIBRILLATION: Chronic | ICD-10-CM

## 2024-02-13 DIAGNOSIS — Z87.891 HISTORY OF TOBACCO ABUSE: ICD-10-CM

## 2024-02-13 DIAGNOSIS — E66.09 CLASS 1 OBESITY DUE TO EXCESS CALORIES WITH SERIOUS COMORBIDITY AND BODY MASS INDEX (BMI) OF 30.0 TO 30.9 IN ADULT: ICD-10-CM

## 2024-02-13 DIAGNOSIS — Z00.01 ENCOUNTER FOR ANNUAL GENERAL MEDICAL EXAMINATION WITH ABNORMAL FINDINGS IN ADULT: Primary | ICD-10-CM

## 2024-02-13 DIAGNOSIS — R20.0 FACIAL NUMBNESS: ICD-10-CM

## 2024-02-13 DIAGNOSIS — Z79.4 TYPE 2 DIABETES MELLITUS WITH DIABETIC NEUROPATHY, WITH LONG-TERM CURRENT USE OF INSULIN: Chronic | ICD-10-CM

## 2024-02-13 DIAGNOSIS — R41.3 MEMORY IMPAIRMENT: Chronic | ICD-10-CM

## 2024-02-13 DIAGNOSIS — F32.5 MAJOR DEPRESSIVE DISORDER IN FULL REMISSION, UNSPECIFIED WHETHER RECURRENT: Chronic | ICD-10-CM

## 2024-02-13 DIAGNOSIS — L40.9 PSORIASIS: ICD-10-CM

## 2024-02-13 DIAGNOSIS — I63.9 LEFT PONTINE STROKE: ICD-10-CM

## 2024-02-13 DIAGNOSIS — E03.9 HYPOTHYROIDISM, UNSPECIFIED TYPE: Chronic | ICD-10-CM

## 2024-02-13 DIAGNOSIS — Z12.11 SCREENING FOR COLON CANCER: ICD-10-CM

## 2024-02-13 DIAGNOSIS — I05.0 MITRAL VALVE STENOSIS, UNSPECIFIED ETIOLOGY: Chronic | ICD-10-CM

## 2024-02-13 DIAGNOSIS — E11.40 TYPE 2 DIABETES MELLITUS WITH DIABETIC NEUROPATHY, WITH LONG-TERM CURRENT USE OF INSULIN: Chronic | ICD-10-CM

## 2024-02-13 DIAGNOSIS — K21.9 GASTROESOPHAGEAL REFLUX DISEASE, UNSPECIFIED WHETHER ESOPHAGITIS PRESENT: Chronic | ICD-10-CM

## 2024-02-13 DIAGNOSIS — E55.9 VITAMIN D DEFICIENCY: ICD-10-CM

## 2024-02-13 DIAGNOSIS — E11.42 DIABETIC POLYNEUROPATHY ASSOCIATED WITH TYPE 2 DIABETES MELLITUS: ICD-10-CM

## 2024-02-13 PROBLEM — E66.811 CLASS 1 OBESITY DUE TO EXCESS CALORIES WITH SERIOUS COMORBIDITY AND BODY MASS INDEX (BMI) OF 30.0 TO 30.9 IN ADULT: Status: ACTIVE | Noted: 2024-02-13

## 2024-02-14 ENCOUNTER — TELEPHONE (OUTPATIENT)
Dept: FAMILY MEDICINE CLINIC | Facility: CLINIC | Age: 74
End: 2024-02-14
Payer: MEDICARE

## 2024-02-14 DIAGNOSIS — F41.9 ANXIETY: ICD-10-CM

## 2024-02-14 RX ORDER — ALPRAZOLAM 0.25 MG/1
0.25 TABLET ORAL 2 TIMES DAILY PRN
Qty: 36 TABLET | Refills: 0 | Status: SHIPPED | OUTPATIENT
Start: 2024-02-14

## 2024-02-15 ENCOUNTER — TELEPHONE (OUTPATIENT)
Dept: FAMILY MEDICINE CLINIC | Facility: CLINIC | Age: 74
End: 2024-02-15

## 2024-02-15 ENCOUNTER — CLINICAL SUPPORT (OUTPATIENT)
Dept: FAMILY MEDICINE CLINIC | Facility: CLINIC | Age: 74
End: 2024-02-15
Payer: MEDICARE

## 2024-02-15 DIAGNOSIS — E03.9 HYPOTHYROIDISM, UNSPECIFIED TYPE: Chronic | ICD-10-CM

## 2024-02-15 LAB
25(OH)D3 SERPL-MCNC: 48.3 NG/ML (ref 30–100)
ALBUMIN SERPL-MCNC: 4.6 G/DL (ref 3.5–5.2)
ALBUMIN/GLOB SERPL: 1.7 G/DL
ALP SERPL-CCNC: 78 U/L (ref 39–117)
ALT SERPL W P-5'-P-CCNC: 41 U/L (ref 1–33)
ANION GAP SERPL CALCULATED.3IONS-SCNC: 14.8 MMOL/L (ref 5–15)
AST SERPL-CCNC: 40 U/L (ref 1–32)
BASOPHILS # BLD MANUAL: 0.11 10*3/MM3 (ref 0–0.2)
BASOPHILS NFR BLD MANUAL: 1 % (ref 0–1.5)
BILIRUB SERPL-MCNC: 0.4 MG/DL (ref 0–1.2)
BUN SERPL-MCNC: 27 MG/DL (ref 8–23)
BUN/CREAT SERPL: 21.8 (ref 7–25)
CALCIUM SPEC-SCNC: 9.6 MG/DL (ref 8.6–10.5)
CHLORIDE SERPL-SCNC: 94 MMOL/L (ref 98–107)
CHOLEST SERPL-MCNC: 170 MG/DL (ref 0–200)
CO2 SERPL-SCNC: 26.2 MMOL/L (ref 22–29)
CREAT SERPL-MCNC: 1.24 MG/DL (ref 0.57–1)
DEPRECATED RDW RBC AUTO: 41.7 FL (ref 37–54)
EGFRCR SERPLBLD CKD-EPI 2021: 45.5 ML/MIN/1.73
EOSINOPHIL # BLD MANUAL: 0.33 10*3/MM3 (ref 0–0.4)
EOSINOPHIL NFR BLD MANUAL: 3 % (ref 0.3–6.2)
ERYTHROCYTE [DISTWIDTH] IN BLOOD BY AUTOMATED COUNT: 13.7 % (ref 12.3–15.4)
GLOBULIN UR ELPH-MCNC: 2.7 GM/DL
GLUCOSE SERPL-MCNC: 105 MG/DL (ref 65–99)
HBA1C MFR BLD: 8.4 % (ref 4.8–5.6)
HCT VFR BLD AUTO: 41.9 % (ref 34–46.6)
HDLC SERPL-MCNC: 67 MG/DL (ref 40–60)
HGB BLD-MCNC: 13.9 G/DL (ref 12–15.9)
LDLC SERPL CALC-MCNC: 67 MG/DL (ref 0–100)
LDLC/HDLC SERPL: 0.87 {RATIO}
LYMPHOCYTES # BLD MANUAL: 6.74 10*3/MM3 (ref 0.7–3.1)
LYMPHOCYTES NFR BLD MANUAL: 3 % (ref 5–12)
MAGNESIUM SERPL-MCNC: 2.2 MG/DL (ref 1.6–2.4)
MCH RBC QN AUTO: 28.3 PG (ref 26.6–33)
MCHC RBC AUTO-ENTMCNC: 33.2 G/DL (ref 31.5–35.7)
MCV RBC AUTO: 85.2 FL (ref 79–97)
MONOCYTES # BLD: 0.33 10*3/MM3 (ref 0.1–0.9)
NEUTROPHILS # BLD AUTO: 3.48 10*3/MM3 (ref 1.7–7)
NEUTROPHILS NFR BLD MANUAL: 31.7 % (ref 42.7–76)
PLAT MORPH BLD: NORMAL
PLATELET # BLD AUTO: 329 10*3/MM3 (ref 140–450)
PMV BLD AUTO: 10.2 FL (ref 6–12)
POTASSIUM SERPL-SCNC: 3.5 MMOL/L (ref 3.5–5.2)
PROT SERPL-MCNC: 7.3 G/DL (ref 6–8.5)
RBC # BLD AUTO: 4.92 10*6/MM3 (ref 3.77–5.28)
RBC MORPH BLD: NORMAL
SODIUM SERPL-SCNC: 135 MMOL/L (ref 136–145)
TRIGL SERPL-MCNC: 224 MG/DL (ref 0–150)
TSH SERPL DL<=0.05 MIU/L-ACNC: 3.18 UIU/ML (ref 0.27–4.2)
VARIANT LYMPHS NFR BLD MANUAL: 5 % (ref 0–5)
VARIANT LYMPHS NFR BLD MANUAL: 56.4 % (ref 19.6–45.3)
VIT B12 BLD-MCNC: 718 PG/ML (ref 211–946)
VLDLC SERPL-MCNC: 36 MG/DL (ref 5–40)
WBC MORPH BLD: NORMAL
WBC NRBC COR # BLD AUTO: 10.98 10*3/MM3 (ref 3.4–10.8)

## 2024-02-15 PROCEDURE — 82043 UR ALBUMIN QUANTITATIVE: CPT | Performed by: PREVENTIVE MEDICINE

## 2024-02-15 PROCEDURE — 85025 COMPLETE CBC W/AUTO DIFF WBC: CPT | Performed by: PREVENTIVE MEDICINE

## 2024-02-15 PROCEDURE — 83735 ASSAY OF MAGNESIUM: CPT | Performed by: PREVENTIVE MEDICINE

## 2024-02-15 PROCEDURE — 80053 COMPREHEN METABOLIC PANEL: CPT | Performed by: PREVENTIVE MEDICINE

## 2024-02-15 PROCEDURE — 85007 BL SMEAR W/DIFF WBC COUNT: CPT | Performed by: PREVENTIVE MEDICINE

## 2024-02-15 PROCEDURE — 80061 LIPID PANEL: CPT | Performed by: PREVENTIVE MEDICINE

## 2024-02-15 PROCEDURE — 82607 VITAMIN B-12: CPT | Performed by: PREVENTIVE MEDICINE

## 2024-02-15 PROCEDURE — 36415 COLL VENOUS BLD VENIPUNCTURE: CPT | Performed by: PREVENTIVE MEDICINE

## 2024-02-15 PROCEDURE — 82570 ASSAY OF URINE CREATININE: CPT | Performed by: PREVENTIVE MEDICINE

## 2024-02-15 PROCEDURE — 84443 ASSAY THYROID STIM HORMONE: CPT | Performed by: PREVENTIVE MEDICINE

## 2024-02-15 PROCEDURE — 82306 VITAMIN D 25 HYDROXY: CPT | Performed by: PREVENTIVE MEDICINE

## 2024-02-15 PROCEDURE — 83036 HEMOGLOBIN GLYCOSYLATED A1C: CPT | Performed by: PREVENTIVE MEDICINE

## 2024-02-16 ENCOUNTER — TELEPHONE (OUTPATIENT)
Dept: FAMILY MEDICINE CLINIC | Facility: CLINIC | Age: 74
End: 2024-02-16
Payer: MEDICARE

## 2024-02-16 DIAGNOSIS — R74.01 ELEVATED TRANSAMINASE LEVEL: ICD-10-CM

## 2024-02-16 DIAGNOSIS — D72.820 LYMPHOCYTOSIS: Primary | ICD-10-CM

## 2024-02-16 LAB
ALBUMIN UR-MCNC: 34.7 MG/DL
CREAT UR-MCNC: 38.9 MG/DL
MICROALBUMIN/CREAT UR: 892 MG/G (ref 0–29)

## 2024-02-16 NOTE — TELEPHONE ENCOUNTER
HUB TO READ  ----- Message from Yumiko Shah MD sent at 2/16/2024  7:42 AM EST -----  Blood sugar was 105 with A1c coming down to 8.4.  This is much improved but you are spilling lots of protein in your urine.  We should consider starting Farxiga or Jardiance which I will send and if it is too expensive we will send the other medication.  Kidney function is still decreased but it has not deteriorated any further over the last several months liver functions are mildly elevated so avoid ibuprofen Aleve Motrin and lets cut the Crestor to every other day and repeat the liver functions in 6 weeks.  White blood cell count is slightly elevated and the majority of these are lymphocytes.  This has been elevated for the last several blood checks.  Would be glad to continue to follow or you can see one of the blood doctors or hematologists.  Thyroid test was normal  Call and let us know about the above or if you have any other questions or concerns.

## 2024-02-27 DIAGNOSIS — J45.909 ASTHMA, UNSPECIFIED ASTHMA SEVERITY, UNSPECIFIED WHETHER COMPLICATED, UNSPECIFIED WHETHER PERSISTENT: ICD-10-CM

## 2024-02-27 DIAGNOSIS — I10 PRIMARY HYPERTENSION: ICD-10-CM

## 2024-02-27 RX ORDER — CETIRIZINE HYDROCHLORIDE 10 MG/1
10 TABLET ORAL DAILY
Qty: 90 TABLET | Refills: 0 | Status: SHIPPED | OUTPATIENT
Start: 2024-02-27

## 2024-02-27 RX ORDER — TRIAMTERENE AND HYDROCHLOROTHIAZIDE 75; 50 MG/1; MG/1
1 TABLET ORAL DAILY
Qty: 90 TABLET | Refills: 0 | Status: SHIPPED | OUTPATIENT
Start: 2024-02-27

## 2024-02-27 RX ORDER — MONTELUKAST SODIUM 10 MG/1
10 TABLET ORAL NIGHTLY
Qty: 90 TABLET | Refills: 0 | Status: SHIPPED | OUTPATIENT
Start: 2024-02-27

## 2024-02-27 NOTE — TELEPHONE ENCOUNTER
Rx Refill Note  Requested Prescriptions     Pending Prescriptions Disp Refills    triamterene-hydrochlorothiazide (MAXZIDE) 75-50 MG per tablet 90 tablet 0     Sig: Take 1 tablet by mouth Daily.     Signed Prescriptions Disp Refills    apixaban (ELIQUIS) 5 MG tablet tablet 180 tablet 0     Sig: Take 1 tablet by mouth Every 12 (Twelve) Hours. Indications: Atrial Fibrillation     Authorizing Provider: TACO INGRAM     Ordering User: ОЛЕГ MUÑOZ    cetirizine (zyrTEC) 10 MG tablet 90 tablet 0     Sig: Take 1 tablet by mouth Daily.     Authorizing Provider: TACO INGRAM     Ordering User: ОЛЕГ MUÑOZ    montelukast (SINGULAIR) 10 MG tablet 90 tablet 0     Sig: Take 1 tablet by mouth Every Night.     Authorizing Provider: TACO INGRAM     Ordering User: ОЛЕГ MUÑOZ      Last office visit with prescribing clinician: 2/13/2024   Last telemedicine visit with prescribing clinician: Visit date not found   Next office visit with prescribing clinician: 5/14/2024                         Would you like a call back once the refill request has been completed: [] Yes [] No    If the office needs to give you a call back, can they leave a voicemail: [] Yes [] No    Олег Muñoz MA  02/27/24, 11:29 EST

## 2024-03-25 DIAGNOSIS — N95.1 MENOPAUSAL SYNDROME (HOT FLASHES): ICD-10-CM

## 2024-03-25 DIAGNOSIS — F32.5 MAJOR DEPRESSIVE DISORDER IN FULL REMISSION, UNSPECIFIED WHETHER RECURRENT: ICD-10-CM

## 2024-03-25 DIAGNOSIS — J45.909 ASTHMA, UNSPECIFIED ASTHMA SEVERITY, UNSPECIFIED WHETHER COMPLICATED, UNSPECIFIED WHETHER PERSISTENT: ICD-10-CM

## 2024-03-25 DIAGNOSIS — Z79.890 ENCOUNTER FOR MONITORING POSTMENOPAUSAL ESTROGEN REPLACEMENT THERAPY: ICD-10-CM

## 2024-03-25 DIAGNOSIS — I63.9 CEREBROVASCULAR ACCIDENT (CVA), UNSPECIFIED MECHANISM: ICD-10-CM

## 2024-03-25 DIAGNOSIS — G43.809 OTHER MIGRAINE WITHOUT STATUS MIGRAINOSUS, NOT INTRACTABLE: ICD-10-CM

## 2024-03-25 DIAGNOSIS — Z51.81 ENCOUNTER FOR MONITORING POSTMENOPAUSAL ESTROGEN REPLACEMENT THERAPY: ICD-10-CM

## 2024-03-25 DIAGNOSIS — K21.9 GASTROESOPHAGEAL REFLUX DISEASE, UNSPECIFIED WHETHER ESOPHAGITIS PRESENT: ICD-10-CM

## 2024-03-25 RX ORDER — PAROXETINE HYDROCHLORIDE 20 MG/1
20 TABLET, FILM COATED ORAL DAILY
Qty: 90 TABLET | Refills: 3 | OUTPATIENT
Start: 2024-03-25

## 2024-03-25 RX ORDER — MECLIZINE HYDROCHLORIDE 25 MG/1
TABLET ORAL
Qty: 90 TABLET | Refills: 11 | OUTPATIENT
Start: 2024-03-25

## 2024-03-25 RX ORDER — FLUTICASONE PROPIONATE AND SALMETEROL 250; 50 UG/1; UG/1
1 POWDER RESPIRATORY (INHALATION) 2 TIMES DAILY
Qty: 180 EACH | Refills: 3 | OUTPATIENT
Start: 2024-03-25

## 2024-03-25 RX ORDER — PANTOPRAZOLE SODIUM 40 MG/1
40 TABLET, DELAYED RELEASE ORAL DAILY
Qty: 90 TABLET | Refills: 3 | OUTPATIENT
Start: 2024-03-25

## 2024-03-25 RX ORDER — ESTROGENS, CONJUGATED 1.25 MG
1.25 TABLET ORAL DAILY
Qty: 30 TABLET | Refills: 11 | OUTPATIENT
Start: 2024-03-25

## 2024-03-25 RX ORDER — TOPIRAMATE 25 MG/1
25 TABLET ORAL 2 TIMES DAILY
Qty: 180 TABLET | Refills: 3 | OUTPATIENT
Start: 2024-03-25

## 2024-03-26 DIAGNOSIS — I63.9 CEREBROVASCULAR ACCIDENT (CVA), UNSPECIFIED MECHANISM: ICD-10-CM

## 2024-03-26 DIAGNOSIS — F32.5 MAJOR DEPRESSIVE DISORDER IN FULL REMISSION, UNSPECIFIED WHETHER RECURRENT: ICD-10-CM

## 2024-03-26 DIAGNOSIS — E11.9 TYPE 2 DIABETES MELLITUS WITHOUT COMPLICATION, WITHOUT LONG-TERM CURRENT USE OF INSULIN: ICD-10-CM

## 2024-03-26 DIAGNOSIS — K21.9 GASTROESOPHAGEAL REFLUX DISEASE, UNSPECIFIED WHETHER ESOPHAGITIS PRESENT: ICD-10-CM

## 2024-03-26 DIAGNOSIS — I10 HYPERTENSION, UNSPECIFIED TYPE: ICD-10-CM

## 2024-03-26 DIAGNOSIS — G43.809 OTHER MIGRAINE WITHOUT STATUS MIGRAINOSUS, NOT INTRACTABLE: ICD-10-CM

## 2024-03-26 RX ORDER — PAROXETINE HYDROCHLORIDE 20 MG/1
20 TABLET, FILM COATED ORAL DAILY
Qty: 90 TABLET | Refills: 3 | OUTPATIENT
Start: 2024-03-26

## 2024-03-26 RX ORDER — POTASSIUM CHLORIDE 20 MEQ/1
20 TABLET, EXTENDED RELEASE ORAL DAILY
Qty: 21 TABLET | Refills: 0 | OUTPATIENT
Start: 2024-03-26

## 2024-03-26 RX ORDER — TOPIRAMATE 25 MG/1
25 TABLET ORAL 2 TIMES DAILY
Qty: 180 TABLET | Refills: 3 | OUTPATIENT
Start: 2024-03-26

## 2024-03-26 RX ORDER — EMPAGLIFLOZIN, METFORMIN HYDROCHLORIDE 25; 1000 MG/1; MG/1
TABLET, EXTENDED RELEASE ORAL
COMMUNITY

## 2024-03-26 RX ORDER — EMPAGLIFLOZIN, METFORMIN HYDROCHLORIDE 25; 1000 MG/1; MG/1
1 TABLET, EXTENDED RELEASE ORAL DAILY
Qty: 90 TABLET | Refills: 3 | OUTPATIENT
Start: 2024-03-26

## 2024-03-26 RX ORDER — MECLIZINE HYDROCHLORIDE 25 MG/1
25 TABLET ORAL 3 TIMES DAILY PRN
Qty: 90 TABLET | Refills: 0 | OUTPATIENT
Start: 2024-03-26

## 2024-03-26 RX ORDER — PANTOPRAZOLE SODIUM 40 MG/1
40 TABLET, DELAYED RELEASE ORAL DAILY
Qty: 90 TABLET | Refills: 3 | OUTPATIENT
Start: 2024-03-26

## 2024-03-26 NOTE — TELEPHONE ENCOUNTER
Rx Refill Note  Requested Prescriptions     Pending Prescriptions Disp Refills    PARoxetine (PAXIL) 20 MG tablet 90 tablet 3     Sig: Take 1 tablet by mouth Daily.    pantoprazole (PROTONIX) 40 MG EC tablet 90 tablet 3     Sig: Take 1 tablet by mouth Daily.    meclizine (ANTIVERT) 25 MG tablet 90 tablet 0     Sig: Take 1 tablet by mouth 3 (Three) Times a Day As Needed for Dizziness.    topiramate (TOPAMAX) 25 MG tablet 180 tablet 3     Sig: Take 1 tablet by mouth 2 (Two) Times a Day.      Last office visit with prescribing clinician: 2/13/2024   Last telemedicine visit with prescribing clinician: Visit date not found   Next office visit with prescribing clinician: 5/14/2024   {    Would you like a call back once the refill request has been completed: [] Yes [] No    If the office needs to give you a call back, can they leave a voicemail: [] Yes [] No    Shavonne Gatica  03/26/24, 09:31 EDT

## 2024-03-29 RX ORDER — TOPIRAMATE 25 MG/1
25 TABLET ORAL 2 TIMES DAILY
Qty: 180 TABLET | Refills: 0 | Status: SHIPPED | OUTPATIENT
Start: 2024-03-29

## 2024-03-29 RX ORDER — POTASSIUM CHLORIDE 20 MEQ/1
20 TABLET, EXTENDED RELEASE ORAL DAILY
Qty: 21 TABLET | Refills: 0 | Status: SHIPPED | OUTPATIENT
Start: 2024-03-29

## 2024-03-29 RX ORDER — PANTOPRAZOLE SODIUM 40 MG/1
40 TABLET, DELAYED RELEASE ORAL DAILY
Qty: 90 TABLET | Refills: 0 | Status: SHIPPED | OUTPATIENT
Start: 2024-03-29

## 2024-03-29 RX ORDER — MECLIZINE HYDROCHLORIDE 25 MG/1
25 TABLET ORAL 3 TIMES DAILY PRN
Qty: 90 TABLET | Refills: 0 | Status: SHIPPED | OUTPATIENT
Start: 2024-03-29

## 2024-03-29 RX ORDER — PAROXETINE HYDROCHLORIDE 20 MG/1
20 TABLET, FILM COATED ORAL DAILY
Qty: 90 TABLET | Refills: 0 | Status: SHIPPED | OUTPATIENT
Start: 2024-03-29

## 2024-03-29 RX ORDER — EMPAGLIFLOZIN, METFORMIN HYDROCHLORIDE 25; 1000 MG/1; MG/1
1 TABLET, EXTENDED RELEASE ORAL DAILY
Qty: 90 TABLET | Refills: 0 | Status: SHIPPED | OUTPATIENT
Start: 2024-03-29

## 2024-04-29 ENCOUNTER — TELEPHONE (OUTPATIENT)
Dept: FAMILY MEDICINE CLINIC | Facility: CLINIC | Age: 74
End: 2024-04-29
Payer: MEDICARE

## 2024-04-29 NOTE — TELEPHONE ENCOUNTER
Provider: DR TACO INGRAM MD    Caller: REVA RIDDLE    Relationship to Patient: SELF    Phone Number: [phone]     Reason for Call: PATIENT STATES THAT [employer] WILL BE SENDING OVER A PRESCRIPTION FOR PATIENT'S FREESTYLE LILI FOR HER ARM. PATIENT WANTED TO LET OFFICE KNOW TO BE ON THE LOOKOUT FOR THIS FAX AND TO HAVE DR INGRAM SIGN IT AND SEND IT BACK TO  THIS FAX #    [phone] JENNIFER DENTON.    PLEASE CALL PATIENT WHEN THIS HAS BEEN DONE.     PATIENT WOULD LIKE DR INGRAM TO GIVE HER THE NAME OF A GYNECOLOGIST SO SHE CAN GET HER REFILLS ON HER estrogens, conjugated, (PREMARIN) 1.25 MG tablet.

## 2024-05-02 ENCOUNTER — TELEPHONE (OUTPATIENT)
Dept: FAMILY MEDICINE CLINIC | Facility: CLINIC | Age: 74
End: 2024-05-02
Payer: MEDICARE

## 2024-05-02 NOTE — TELEPHONE ENCOUNTER
Caller: [facility]    Best call back number: [phone]    Patient is needing:     CALLING TO CONFIRM IF A FAX WAS RECEIVED SO THEY CAN SUPPLY THE PATIENT WITH A FREE STYLE LILI 3.       RETURN FAX: [phone]

## 2024-05-03 ENCOUNTER — TELEPHONE (OUTPATIENT)
Dept: FAMILY MEDICINE CLINIC | Facility: CLINIC | Age: 74
End: 2024-05-03
Payer: MEDICARE

## 2024-05-03 NOTE — TELEPHONE ENCOUNTER
Caller: [facility]    Best call back number: [phone]     What was the call regarding: CALLER IS REQUESTING TO KNOW IF A  FAX REGARDING MEDICAL RECORDS WAS RECEIVED     PLEASE ADVISE

## 2024-05-13 NOTE — PATIENT INSTRUCTIONS
Health Maintenance Due   Topic Date Due    COLORECTAL CANCER SCREENING  Never done    Pneumococcal Vaccine 65+ (1 of 2 - PCV) Never done    ZOSTER VACCINE (1 of 2) Never done    RSV Vaccine - Adults (1 - 1-dose 60+ series) Never done    LUNG CANCER SCREENING  10/15/2022    COVID-19 Vaccine (1 - 2023-24 season) Never done

## 2024-05-14 ENCOUNTER — OFFICE VISIT (OUTPATIENT)
Dept: FAMILY MEDICINE CLINIC | Facility: CLINIC | Age: 74
End: 2024-05-14
Payer: MEDICARE

## 2024-05-14 VITALS
BODY MASS INDEX: 31.43 KG/M2 | HEIGHT: 62 IN | DIASTOLIC BLOOD PRESSURE: 76 MMHG | TEMPERATURE: 98 F | HEART RATE: 88 BPM | WEIGHT: 170.8 LBS | SYSTOLIC BLOOD PRESSURE: 152 MMHG | OXYGEN SATURATION: 98 %

## 2024-05-14 DIAGNOSIS — I05.0 MITRAL VALVE STENOSIS, UNSPECIFIED ETIOLOGY: Chronic | ICD-10-CM

## 2024-05-14 DIAGNOSIS — I10 PRIMARY HYPERTENSION: Chronic | ICD-10-CM

## 2024-05-14 DIAGNOSIS — E78.2 MIXED HYPERLIPIDEMIA: Chronic | ICD-10-CM

## 2024-05-14 DIAGNOSIS — I10 HYPERTENSION, UNSPECIFIED TYPE: ICD-10-CM

## 2024-05-14 DIAGNOSIS — E55.9 VITAMIN D DEFICIENCY: ICD-10-CM

## 2024-05-14 DIAGNOSIS — E11.40 TYPE 2 DIABETES MELLITUS WITH DIABETIC NEUROPATHY, WITH LONG-TERM CURRENT USE OF INSULIN: Primary | Chronic | ICD-10-CM

## 2024-05-14 DIAGNOSIS — Z79.4 TYPE 2 DIABETES MELLITUS WITH DIABETIC NEUROPATHY, WITH LONG-TERM CURRENT USE OF INSULIN: Primary | Chronic | ICD-10-CM

## 2024-05-14 DIAGNOSIS — Z87.891 PERSONAL HISTORY OF NICOTINE DEPENDENCE: ICD-10-CM

## 2024-05-14 DIAGNOSIS — F32.5 MAJOR DEPRESSIVE DISORDER IN FULL REMISSION, UNSPECIFIED WHETHER RECURRENT: Chronic | ICD-10-CM

## 2024-05-14 DIAGNOSIS — E66.09 CLASS 1 OBESITY DUE TO EXCESS CALORIES WITH SERIOUS COMORBIDITY AND BODY MASS INDEX (BMI) OF 31.0 TO 31.9 IN ADULT: ICD-10-CM

## 2024-05-14 DIAGNOSIS — D72.820 LYMPHOCYTOSIS: ICD-10-CM

## 2024-05-14 DIAGNOSIS — Z12.2 ENCOUNTER FOR SCREENING FOR LUNG CANCER: ICD-10-CM

## 2024-05-14 DIAGNOSIS — Z12.11 SCREENING FOR COLON CANCER: ICD-10-CM

## 2024-05-14 DIAGNOSIS — R74.01 ELEVATED TRANSAMINASE LEVEL: ICD-10-CM

## 2024-05-14 DIAGNOSIS — E03.9 HYPOTHYROIDISM, UNSPECIFIED TYPE: Chronic | ICD-10-CM

## 2024-05-14 LAB
ALBUMIN UR-MCNC: 12.6 MG/DL
CREAT UR-MCNC: 124 MG/DL
MICROALBUMIN/CREAT UR: 101.6 MG/G (ref 0–29)
TSH SERPL DL<=0.05 MIU/L-ACNC: 3.05 UIU/ML (ref 0.27–4.2)

## 2024-05-14 PROCEDURE — 80061 LIPID PANEL: CPT | Performed by: PREVENTIVE MEDICINE

## 2024-05-14 PROCEDURE — 1126F AMNT PAIN NOTED NONE PRSNT: CPT | Performed by: PREVENTIVE MEDICINE

## 2024-05-14 PROCEDURE — 3078F DIAST BP <80 MM HG: CPT | Performed by: PREVENTIVE MEDICINE

## 2024-05-14 PROCEDURE — 83735 ASSAY OF MAGNESIUM: CPT | Performed by: PREVENTIVE MEDICINE

## 2024-05-14 PROCEDURE — 1160F RVW MEDS BY RX/DR IN RCRD: CPT | Performed by: PREVENTIVE MEDICINE

## 2024-05-14 PROCEDURE — 1159F MED LIST DOCD IN RCRD: CPT | Performed by: PREVENTIVE MEDICINE

## 2024-05-14 PROCEDURE — 80053 COMPREHEN METABOLIC PANEL: CPT | Performed by: PREVENTIVE MEDICINE

## 2024-05-14 PROCEDURE — 82570 ASSAY OF URINE CREATININE: CPT | Performed by: PREVENTIVE MEDICINE

## 2024-05-14 PROCEDURE — 84443 ASSAY THYROID STIM HORMONE: CPT | Performed by: PREVENTIVE MEDICINE

## 2024-05-14 PROCEDURE — 82607 VITAMIN B-12: CPT | Performed by: PREVENTIVE MEDICINE

## 2024-05-14 PROCEDURE — 85025 COMPLETE CBC W/AUTO DIFF WBC: CPT | Performed by: PREVENTIVE MEDICINE

## 2024-05-14 PROCEDURE — 99214 OFFICE O/P EST MOD 30 MIN: CPT | Performed by: PREVENTIVE MEDICINE

## 2024-05-14 PROCEDURE — 3074F SYST BP LT 130 MM HG: CPT | Performed by: PREVENTIVE MEDICINE

## 2024-05-14 PROCEDURE — 83036 HEMOGLOBIN GLYCOSYLATED A1C: CPT | Performed by: PREVENTIVE MEDICINE

## 2024-05-14 PROCEDURE — 82043 UR ALBUMIN QUANTITATIVE: CPT | Performed by: PREVENTIVE MEDICINE

## 2024-05-14 PROCEDURE — 3052F HG A1C>EQUAL 8.0%<EQUAL 9.0%: CPT | Performed by: PREVENTIVE MEDICINE

## 2024-05-14 PROCEDURE — 36415 COLL VENOUS BLD VENIPUNCTURE: CPT | Performed by: PREVENTIVE MEDICINE

## 2024-05-14 RX ORDER — NITROGLYCERIN 0.4 MG/1
TABLET SUBLINGUAL
Qty: 25 TABLET | Refills: 1 | Status: SHIPPED | OUTPATIENT
Start: 2024-05-14

## 2024-05-14 NOTE — PROGRESS NOTES
Subjective   Lula Burnett is a 73 y.o. female presents for   Chief Complaint   Patient presents with    Diabetes     Patient is fasting.  3 month check up       Health Maintenance Due   Topic Date Due    COLORECTAL CANCER SCREENING  Never done    Pneumococcal Vaccine 65+ (1 of 2 - PCV) Never done    ZOSTER VACCINE (1 of 2) Never done    RSV Vaccine - Adults (1 - 1-dose 60+ series) Never done    LUNG CANCER SCREENING  10/15/2022    COVID-19 Vaccine (2023-24 season) Never done       Diabetes  Associated symptoms include fatigue.      History of Present Illness  The patient is here today for a 3-month checkup for type 2 diabetes with diabetic neuropathy with long-term recurrent use of insulin, screening for colon cancer screening and lung cancer, class 1 obesity due to excess calories with serious comorbidities and a body mass index of 1, mitral valve stenosis, primary hypertension, mixed hyperlipidemia, hypothyroidism, major depression, vitamin D deficiency, elevated transaminase and lymphocytosis and allergies.    The patient reports experiencing mild, sharp left-sided chest pain, which she attributes to her cardiac issue. She has exhausted her supply of nitroglycerin, with her last dose taken approximately 1.5 weeks ago. She has been unable to secure an appointment with Dr. Estevez, a cardiologist at [facility], as her current appointments are unavailable on . Her previous physician was Dr. Anette Lama, but she no longer attends [place] due to her granddaughter's transportation. Her chest battery  in 2024, causing irritation and pain on the side of her breast. She carries nitroglycerin with her at all times.    The patient's blood glucose level was recorded at 200 yesterday, which she attributes to her respiratory issues. She does not use steroids. Her vision is consistently impaired due to her diabetes. She performs monthly breast self-examinations. She consistently wears shoes and  monitors her feet to prevent stepping on objects. Her granddaughter assists her with toenail trimming. She uses a cane for safety. Her dizziness has improved.    The patient reports increased hyperventilation with age, which becomes difficult for her to breathe while performing dishes, necessitating rest. She has a history of asthma, which has been worsening since her diagnosis of atrial fibrillation. She avoids overexertion and maintains an active lifestyle, [place] She experiences irritation in her bronchial tubes, which she attributes to frequent air-conditioned ventilation. She is currently using ProAir and another inhaler every 12 hours.    The patient denies an increase in dry skin or hair loss, attributing this to her thyroid medication.    The patient reports swelling in her foot and leg, which resolves spontaneously. She denies any history of blood clots. She was evaluated in the hospital for a blood clot, which was negative. She is currently on blood thinners.    The patient's depression is well-managed. She is currently on Paxil, which she believes will exacerbate her depression if she does not take it. She does not take Xanax, as she does not believe she needs it. She is currently prescribed Paxil.    Supplemental Information  She tries not to take Flexeril. When she is busy doing something like sweeping or doing dishes, her back slips out of joint all the time. She has a family history of lower back issues. She went to a chiropractor in [facility] and she fixed it. She has trouble swallowing. She has no digestion. She was told that she has something in her throat from strokes. Her acid pumps in her stomach do not shut off. Sometimes if they do not shut off, she gets sick and vomiting.   She denies any family history of colon cancer.   She is allergic to CODEINE.    Vitals:    05/14/24 1458 05/14/24 1459 05/14/24 1500   BP: 115/80 132/78 152/76   BP Location: Right arm Left  "arm Left arm   Patient Position: Sitting Sitting Sitting   Cuff Size: Adult Adult Adult   Pulse: 88 88 88   Temp: 98 °F (36.7 °C)     TempSrc: Temporal     SpO2: 98%     Weight: 77.5 kg (170 lb 12.8 oz)     Height: 157.5 cm (62\")       Body mass index is 31.24 kg/m².    Current Outpatient Medications on File Prior to Visit   Medication Sig Dispense Refill    apixaban (ELIQUIS) 5 MG tablet tablet Take 1 tablet by mouth Every 12 (Twelve) Hours. Indications: Atrial Fibrillation 180 tablet 0    aspirin 81 MG chewable tablet Chew 1 tablet Daily.      carvedilol (COREG) 6.25 MG tablet Take 1 tablet by mouth 2 (Two) Times a Day With Meals. 28 tablet 0    cetirizine (zyrTEC) 10 MG tablet Take 1 tablet by mouth Daily. 90 tablet 0    cyclobenzaprine (FLEXERIL) 10 MG tablet Take 1 tablet by mouth 3 (Three) Times a Day As Needed for Muscle Spasms. 90 tablet 1    Empagliflozin-metFORMIN HCl ER (Synjardy XR)  MG tablet sustained-release 24 hour Take  by mouth.      estrogens, conjugated, (PREMARIN) 1.25 MG tablet Take 1 tablet by mouth Daily. 90 tablet 3    Insulin Glargine (Lantus SoloStar) 100 UNIT/ML injection pen Inject 30 Units under the skin into the appropriate area as directed Every Night.      Insulin Lispro, 1 Unit Dial, (HumaLOG KwikPen) 100 UNIT/ML solution pen-injector Inject 10 Units under the skin into the appropriate area as directed Daily With Breakfast, Lunch & Dinner. 15 mL 3    Insulin Pen Needle (B-D UF III MINI PEN NEEDLES) 31G X 5 MM misc 1 Piece Daily. 100 each 3    Insulin Pen Needle (Pen Needles) 32G X 4 MM misc Use 1 each 4 (Four) Times a Day Before Meals & at Bedtime. 600 each 2    levothyroxine (SYNTHROID, LEVOTHROID) 50 MCG tablet TAKE 1 TABLET BY MOUTH DAILY 90 tablet 0    meclizine (ANTIVERT) 25 MG tablet Take 1 tablet by mouth 3 (Three) Times a Day As Needed for Dizziness. 90 tablet 0    montelukast (SINGULAIR) 10 MG tablet Take 1 tablet by mouth Every Night. 90 tablet 0    Multiple " Vitamins-Minerals (CENTRUM FRESH/FRUITY 50+ PO) Take  by mouth.      ondansetron ODT (ZOFRAN-ODT) 8 MG disintegrating tablet PLACE 1 TABLET ON THE TONGUE EVERY 8 HOURS AS NEEDED FOR NAUSEA OR VOMITING 30 tablet 0    pantoprazole (PROTONIX) 40 MG EC tablet Take 1 tablet by mouth Daily. 90 tablet 0    PARoxetine (PAXIL) 20 MG tablet Take 1 tablet by mouth Daily. 90 tablet 0    potassium chloride (KLOR-CON M20) 20 MEQ CR tablet Take 1 tablet by mouth Daily. 21 tablet 0    ProAir  (90 Base) MCG/ACT inhaler Inhale 2 puffs Every 4 (Four) Hours As Needed for Wheezing or Shortness of Air. 18 g 3    rosuvastatin (CRESTOR) 20 MG tablet TAKE 1 TABLET DAILY 90 tablet 3    Synjardy XR  MG tablet sustained-release 24 hour Take 1 tablet by mouth Daily. 90 tablet 0    topiramate (TOPAMAX) 25 MG tablet Take 1 tablet by mouth 2 (Two) Times a Day. 180 tablet 0    triamterene-hydrochlorothiazide (MAXZIDE) 75-50 MG per tablet Take 1 tablet by mouth Daily. 90 tablet 0    Turmeric 500 MG capsule Take  by mouth.      [DISCONTINUED] nitroglycerin (NITROSTAT) 0.4 MG SL tablet 1 under the tongue as needed for angina, may repeat q5mins for up three doses 20 tablet 1     No current facility-administered medications on file prior to visit.       The following portions of the patient's history were reviewed and updated as appropriate: allergies, current medications, past family history, past medical history, past social history, past surgical history, and problem list.    Review of Systems   Constitutional:  Positive for activity change and fatigue. Negative for fever.   HENT:  Negative for sore throat.    Respiratory:  Positive for apnea, shortness of breath and wheezing.    Gastrointestinal:  Negative for abdominal pain.   Genitourinary:  Negative for difficulty urinating.       Objective   Physical Exam  Vitals reviewed.   Constitutional:       General: She is not in acute distress.     Appearance: She is well-developed. She is  obese. She is not ill-appearing or toxic-appearing.   HENT:      Head: Normocephalic and atraumatic.      Right Ear: Tympanic membrane, ear canal and external ear normal.      Left Ear: Tympanic membrane, ear canal and external ear normal.      Nose: Nose normal.      Mouth/Throat:      Mouth: Mucous membranes are moist.      Pharynx: No posterior oropharyngeal erythema.   Eyes:      Extraocular Movements: Extraocular movements intact.      Conjunctiva/sclera: Conjunctivae normal.      Pupils: Pupils are equal, round, and reactive to light.   Neck:      Vascular: No carotid bruit.   Cardiovascular:      Rate and Rhythm: Normal rate and regular rhythm.      Pulses:           Dorsalis pedis pulses are 1+ on the right side and 1+ on the left side.        Posterior tibial pulses are 1+ on the right side and 1+ on the left side.      Heart sounds: Murmur heard.      Comments: Heart rate and rhythm were normal no irregularly irregular rhythm  Pulmonary:      Effort: Pulmonary effort is normal.      Comments: Decreased breath sounds bilaterally no wheezes rales or rhonchi  Abdominal:      General: Bowel sounds are normal. There is no distension.      Palpations: Abdomen is soft. There is no mass.      Tenderness: There is no abdominal tenderness.   Musculoskeletal:         General: Normal range of motion.      Cervical back: Neck supple. No tenderness.   Feet:      Right foot:      Protective Sensation: 10 sites tested.  10 sites sensed.      Skin integrity: Skin integrity normal. Callus present.      Toenail Condition: Right toenails are abnormally thick.      Left foot:      Protective Sensation: 10 sites tested.  10 sites sensed.      Skin integrity: Skin integrity normal. Callus present.      Toenail Condition: Left toenails are abnormally thick.      Comments: Diabetic Foot Exam Performed and Monofilament Test Performed    Lymphadenopathy:      Cervical: No cervical adenopathy.   Skin:     General: Skin is warm.    Neurological:      General: No focal deficit present.      Mental Status: She is alert and oriented to person, place, and time.   Psychiatric:         Mood and Affect: Mood normal.         Behavior: Behavior normal.       Physical Exam  White stuff noted in the back of the throat.  Carotids sound good.  No wheezing in the lungs.  Heart sounds are regular.  Localized swelling noted on the ankle.    Vital Signs  Blood pressure reading is 115/80 and 132/78.    PHQ-9 Total Score:    Results  Laboratory Studies  Blood sugar was 200.         Assessment & Plan   Diagnoses and all orders for this visit:    1. Type 2 diabetes mellitus with diabetic neuropathy, with long-term current use of insulin (Primary)  -     Cancel: Comprehensive Metabolic Panel  -     Lipid Panel  -     Microalbumin / Creatinine Urine Ratio - Urine, Clean Catch  -     Vitamin B12  -     Hemoglobin A1c    2. Screening for colon cancer  -     Cologuard - Stool, Per Rectum; Future    3. Encounter for screening for lung cancer  -     CT Chest Low Dose Wo; Future    4. Class 1 obesity due to excess calories with serious comorbidity and body mass index (BMI) of 31.0 to 31.9 in adult    5. Mitral valve stenosis, unspecified etiology    6. Primary hypertension  -     Cancel: CBC Auto Differential    7. Mixed hyperlipidemia    8. Hypothyroidism, unspecified type  -     TSH Rfx On Abnormal To Free T4    9. Major depressive disorder in full remission, unspecified whether recurrent  -     Magnesium    10. Vitamin D deficiency    11. Elevated transaminase level  -     Comprehensive Metabolic Panel    12. Lymphocytosis  -     CBC Auto Differential    13. Personal history of nicotine dependence  -     CT Chest Low Dose Wo; Future    Other orders  -     nitroglycerin (NITROSTAT) 0.4 MG SL tablet; 1 under the tongue as needed for angina, may repeat q5mins for up three doses  Dispense: 25 tablet; Refill: 1      Assessment & Plan  1. Chest pain.  A prescription for  nitroglycerin 0.4 mg, 25 tablets, has been issued. A referral to Dr. Estevez, a cardiologist at [facility], has been made.    2. Diabetes.  The patient has been advised to monitor her portion size and maintain physical activity.    3. Health maintenance.  The patient is due for a pneumonia vaccine. A repeat chest CT scan has been ordered. The patient has been advised to use Cologuard for colon cancer screening. A thyroid level test has been ordered.    4. Leg swelling.  The swelling is likely due to sodium intake. The patient has been advised to reduce her salt intake.    Follow-up  The patient is scheduled for a follow-up visit in 3 months.    Patient Instructions     Health Maintenance Due   Topic Date Due    COLORECTAL CANCER SCREENING  Never done    Pneumococcal Vaccine 65+ (1 of 2 - PCV) Never done    ZOSTER VACCINE (1 of 2) Never done    RSV Vaccine - Adults (1 - 1-dose 60+ series) Never done    LUNG CANCER SCREENING  10/15/2022    COVID-19 Vaccine (1 - 2023-24 season) Never done           Patient or patient representative verbalized consent for the use of Ambient Listening during the visit with  Yumiko Shah MD for chart documentation. 5/14/2024  18:35 EDT

## 2024-05-15 ENCOUNTER — TELEPHONE (OUTPATIENT)
Dept: FAMILY MEDICINE CLINIC | Facility: CLINIC | Age: 74
End: 2024-05-15
Payer: MEDICARE

## 2024-05-15 DIAGNOSIS — E78.2 MIXED HYPERLIPIDEMIA: Primary | Chronic | ICD-10-CM

## 2024-05-15 LAB
ALBUMIN SERPL-MCNC: 4.7 G/DL (ref 3.5–5.2)
ALBUMIN/GLOB SERPL: 1.7 G/DL
ALP SERPL-CCNC: 93 U/L (ref 39–117)
ALT SERPL W P-5'-P-CCNC: 65 U/L (ref 1–33)
ANION GAP SERPL CALCULATED.3IONS-SCNC: 20 MMOL/L (ref 5–15)
AST SERPL-CCNC: 73 U/L (ref 1–32)
BASOPHILS # BLD AUTO: 0.02 10*3/MM3 (ref 0–0.2)
BASOPHILS NFR BLD AUTO: 0.2 % (ref 0–1.5)
BILIRUB SERPL-MCNC: 0.3 MG/DL (ref 0–1.2)
BUN SERPL-MCNC: 21 MG/DL (ref 8–23)
BUN/CREAT SERPL: 19.4 (ref 7–25)
CALCIUM SPEC-SCNC: 9.6 MG/DL (ref 8.6–10.5)
CHLORIDE SERPL-SCNC: 100 MMOL/L (ref 98–107)
CHOLEST SERPL-MCNC: 181 MG/DL (ref 0–200)
CO2 SERPL-SCNC: 18 MMOL/L (ref 22–29)
CREAT SERPL-MCNC: 1.08 MG/DL (ref 0.57–1)
DEPRECATED RDW RBC AUTO: 47.8 FL (ref 37–54)
EGFRCR SERPLBLD CKD-EPI 2021: 53.7 ML/MIN/1.73
EOSINOPHIL # BLD AUTO: 0.09 10*3/MM3 (ref 0–0.4)
EOSINOPHIL NFR BLD AUTO: 1.1 % (ref 0.3–6.2)
ERYTHROCYTE [DISTWIDTH] IN BLOOD BY AUTOMATED COUNT: 15.7 % (ref 12.3–15.4)
GLOBULIN UR ELPH-MCNC: 2.8 GM/DL
GLUCOSE SERPL-MCNC: 142 MG/DL (ref 65–99)
HBA1C MFR BLD: 8.5 % (ref 4.8–5.6)
HCT VFR BLD AUTO: 45.6 % (ref 34–46.6)
HDLC SERPL-MCNC: 69 MG/DL (ref 40–60)
HGB BLD-MCNC: 14.9 G/DL (ref 12–15.9)
IMM GRANULOCYTES # BLD AUTO: 0.02 10*3/MM3 (ref 0–0.05)
IMM GRANULOCYTES NFR BLD AUTO: 0.2 % (ref 0–0.5)
LDLC SERPL CALC-MCNC: 78 MG/DL (ref 0–100)
LDLC/HDLC SERPL: 1.02 {RATIO}
LYMPHOCYTES # BLD AUTO: 3.97 10*3/MM3 (ref 0.7–3.1)
LYMPHOCYTES NFR BLD AUTO: 47.1 % (ref 19.6–45.3)
MAGNESIUM SERPL-MCNC: 2.4 MG/DL (ref 1.6–2.4)
MCH RBC QN AUTO: 28 PG (ref 26.6–33)
MCHC RBC AUTO-ENTMCNC: 32.7 G/DL (ref 31.5–35.7)
MCV RBC AUTO: 85.6 FL (ref 79–97)
MONOCYTES # BLD AUTO: 0.36 10*3/MM3 (ref 0.1–0.9)
MONOCYTES NFR BLD AUTO: 4.3 % (ref 5–12)
NEUTROPHILS NFR BLD AUTO: 3.96 10*3/MM3 (ref 1.7–7)
NEUTROPHILS NFR BLD AUTO: 47.1 % (ref 42.7–76)
NRBC BLD AUTO-RTO: 0 /100 WBC (ref 0–0.2)
PLATELET # BLD AUTO: 368 10*3/MM3 (ref 140–450)
PMV BLD AUTO: 10.4 FL (ref 6–12)
POTASSIUM SERPL-SCNC: 4.2 MMOL/L (ref 3.5–5.2)
PROT SERPL-MCNC: 7.5 G/DL (ref 6–8.5)
RBC # BLD AUTO: 5.33 10*6/MM3 (ref 3.77–5.28)
SODIUM SERPL-SCNC: 138 MMOL/L (ref 136–145)
TRIGL SERPL-MCNC: 209 MG/DL (ref 0–150)
VIT B12 BLD-MCNC: 813 PG/ML (ref 211–946)
VLDLC SERPL-MCNC: 34 MG/DL (ref 5–40)
WBC NRBC COR # BLD AUTO: 8.42 10*3/MM3 (ref 3.4–10.8)

## 2024-05-15 RX ORDER — LISINOPRIL 2.5 MG/1
2.5 TABLET ORAL DAILY
Qty: 90 TABLET | Refills: 3 | Status: SHIPPED | OUTPATIENT
Start: 2024-05-15

## 2024-05-15 RX ORDER — POTASSIUM CHLORIDE 20 MEQ/1
20 TABLET, EXTENDED RELEASE ORAL DAILY
Qty: 21 TABLET | Refills: 16 | Status: SHIPPED | OUTPATIENT
Start: 2024-05-15

## 2024-05-15 NOTE — TELEPHONE ENCOUNTER
"Relay     \"Liver functions has increased so let us stop the Rouvastatin and recheck the liver function and 4 weeks.  You are really have to watch your saturated fats and keep up your walking.  Kidney function has improved from 46-54 but I would still advise you do not use Aleve Motrin ibuprofen and continue to limit x-ray dyes blood sugar was 142 and A1c shows you are still not at 8.5 and you are still spilling protein into your urine.  We should add a small lisinopril to see if we can help correct this.  Also if you have not had any low blood sugars we should consider increasing your insulin a couple of units a day in order to lower the A1c closer to 7 from 8.5.  Call if any other questions or concerns and let us know if the above is all right with you.  As always keep sugar source with you at all times in case your blood sugar goes low.\"                "

## 2024-05-15 NOTE — PROGRESS NOTES
Liver functions has increased so let us stop the Rouvastatin and recheck the liver function and 4 weeks.  You are really have to watch your saturated fats and keep up your walking.  Kidney function has improved from 46-54 but I would still advise you do not use Aleve Motrin ibuprofen and continue to limit x-ray dyes blood sugar was 142 and A1c shows you are still not at 8.5 and you are still spilling protein into your urine.  We should add a small lisinopril to see if we can help correct this.  Also if you have not had any low blood sugars we should consider increasing your insulin a couple of units a day in order to lower the A1c closer to 7 from 8.5.  Call if any other questions or concerns and let us know if the above is all right with you.  As always keep sugar source with you at all times in case your blood sugar goes low.

## 2024-05-17 ENCOUNTER — TELEPHONE (OUTPATIENT)
Dept: CARDIOLOGY | Facility: CLINIC | Age: 74
End: 2024-05-17
Payer: MEDICARE

## 2024-05-17 NOTE — TELEPHONE ENCOUNTER
SPOKE W/ NUBIA, SHE EXPRESSED UNDERSTANDING THAT WE DO NOT SCHEDULE [place]. PT WILL BE CALLING FOR APPT.

## 2024-05-17 NOTE — TELEPHONE ENCOUNTER
Hub staff attempted to follow warm transfer process and was unsuccessful     Caller: NUBIA    Relationship to patient: Other    Best call back number: [phone]    Patient is needing: PT'S PRIMARY CARE OFFICE CALLED TO GET PT SCHEDULED AT THE [place] OFFICE WITH DR HERNANDEZ, ATTEMPTED TO WT TO [place]. PLEASE CALL NUBIA [phone] TO GET PT SCHEDULED ASAP

## 2024-05-20 ENCOUNTER — TELEPHONE (OUTPATIENT)
Dept: FAMILY MEDICINE CLINIC | Facility: CLINIC | Age: 74
End: 2024-05-20
Payer: MEDICARE

## 2024-05-20 NOTE — TELEPHONE ENCOUNTER
Stated she is only taking lisinopril 2.5MG daily and triamterene-hydrochlorothiazide 75-50. She has never taken the carvedilol. She says she is feeling great though.

## 2024-05-20 NOTE — TELEPHONE ENCOUNTER
I have that patient is taking carvedilol 6.25 2 times a day lisinopril 2.5 mg daily and triamtereneHydrochlorothiazide 75/50--please let me know if that is what she is taking.

## 2024-05-20 NOTE — TELEPHONE ENCOUNTER
You may be feeling well but your blood pressure is too high.  Where do you want me to send the carvedilol?

## 2024-05-28 DIAGNOSIS — E78.2 MIXED HYPERLIPIDEMIA: ICD-10-CM

## 2024-05-28 DIAGNOSIS — E11.9 TYPE 2 DIABETES MELLITUS WITHOUT COMPLICATION, WITHOUT LONG-TERM CURRENT USE OF INSULIN: ICD-10-CM

## 2024-05-28 RX ORDER — ROSUVASTATIN CALCIUM 20 MG/1
20 TABLET, COATED ORAL DAILY
Qty: 90 TABLET | Refills: 3 | Status: SHIPPED | OUTPATIENT
Start: 2024-05-28

## 2024-05-28 RX ORDER — EMPAGLIFLOZIN, METFORMIN HYDROCHLORIDE 25; 1000 MG/1; MG/1
1 TABLET, EXTENDED RELEASE ORAL DAILY
Qty: 90 TABLET | Refills: 0 | Status: SHIPPED | OUTPATIENT
Start: 2024-05-28

## 2024-05-28 NOTE — TELEPHONE ENCOUNTER
Rx Refill Note  Requested Prescriptions     Pending Prescriptions Disp Refills   • apixaban (ELIQUIS) 5 MG tablet tablet 180 tablet 0     Sig: Take 1 tablet by mouth Every 12 (Twelve) Hours. Indications: Atrial Fibrillation   • rosuvastatin (CRESTOR) 20 MG tablet 90 tablet 3     Sig: Take 1 tablet by mouth Daily.   • Synjardy XR  MG tablet sustained-release 24 hour 90 tablet 0     Sig: Take 1 tablet by mouth Daily.      Last office visit with prescribing clinician: 5/14/2024   Last telemedicine visit with prescribing clinician: Visit date not found   Next office visit with prescribing clinician: 8/15/2024       Would you like a call back once the refill request has been completed: [] Yes [] No    If the office needs to give you a call back, can they leave a voicemail: [] Yes [] No    Jeanine Thacker MA  05/28/24, 11:44 EDT

## 2024-06-03 DIAGNOSIS — J45.909 ASTHMA, UNSPECIFIED ASTHMA SEVERITY, UNSPECIFIED WHETHER COMPLICATED, UNSPECIFIED WHETHER PERSISTENT: ICD-10-CM

## 2024-06-03 RX ORDER — MONTELUKAST SODIUM 10 MG/1
10 TABLET ORAL NIGHTLY
Qty: 90 TABLET | Refills: 3 | Status: SHIPPED | OUTPATIENT
Start: 2024-06-03

## 2024-06-03 RX ORDER — CETIRIZINE HYDROCHLORIDE 10 MG/1
10 TABLET ORAL DAILY
Qty: 90 TABLET | Refills: 3 | Status: SHIPPED | OUTPATIENT
Start: 2024-06-03

## 2024-06-03 NOTE — TELEPHONE ENCOUNTER
Rx Refill Note  Requested Prescriptions     Pending Prescriptions Disp Refills   • cetirizine (zyrTEC) 10 MG tablet [Pharmacy Med Name: CETIRIZINE TABS-OTC 10MG] 90 tablet 3     Sig: TAKE 1 TABLET DAILY   • montelukast (SINGULAIR) 10 MG tablet [Pharmacy Med Name: MONTELUKAST SODIUM TABS 10MG] 90 tablet 3     Sig: TAKE 1 TABLET EVERY NIGHT      Last office visit with prescribing clinician: 5/14/2024   Last telemedicine visit with prescribing clinician: Visit date not found   Next office visit with prescribing clinician: 8/15/2024       Would you like a call back once the refill request has been completed: [] Yes [] No    If the office needs to give you a call back, can they leave a voicemail: [] Yes [] No    Jeanine Thacker MA  06/03/24, 08:16 EDT

## 2024-06-05 DIAGNOSIS — E03.9 ACQUIRED HYPOTHYROIDISM: ICD-10-CM

## 2024-06-05 RX ORDER — LEVOTHYROXINE SODIUM 0.05 MG/1
50 TABLET ORAL DAILY
Qty: 90 TABLET | Refills: 3 | OUTPATIENT
Start: 2024-06-05

## 2024-06-07 DIAGNOSIS — I10 PRIMARY HYPERTENSION: ICD-10-CM

## 2024-06-07 RX ORDER — TRIAMTERENE AND HYDROCHLOROTHIAZIDE 75; 50 MG/1; MG/1
1 TABLET ORAL DAILY
Qty: 90 TABLET | Refills: 3 | Status: SHIPPED | OUTPATIENT
Start: 2024-06-07

## 2024-06-28 ENCOUNTER — TELEPHONE (OUTPATIENT)
Dept: FAMILY MEDICINE CLINIC | Facility: CLINIC | Age: 74
End: 2024-06-28

## 2024-06-28 NOTE — TELEPHONE ENCOUNTER
"  Caller: Lula Burnett \"CHANCE\"    Relationship: Self    Best call back number:     [phone] (Mobile)         What was the call regarding: PATIENT JUST WANTED TO LET YOU KNOW THAT A DIFFERENT PHARMACY IS FAXING OVER SOME REQUESTS FOR EQUIPMENT         Is it okay if the provider responds through MyChart: CALL IF NEEDED      "

## 2024-08-05 DIAGNOSIS — I63.9 CEREBROVASCULAR ACCIDENT (CVA), UNSPECIFIED MECHANISM: ICD-10-CM

## 2024-08-05 DIAGNOSIS — E03.9 ACQUIRED HYPOTHYROIDISM: ICD-10-CM

## 2024-08-05 DIAGNOSIS — G43.809 OTHER MIGRAINE WITHOUT STATUS MIGRAINOSUS, NOT INTRACTABLE: ICD-10-CM

## 2024-08-05 RX ORDER — TOPIRAMATE 25 MG/1
25 TABLET ORAL 2 TIMES DAILY
Qty: 180 TABLET | Refills: 0 | Status: SHIPPED | OUTPATIENT
Start: 2024-08-05

## 2024-08-05 RX ORDER — LISINOPRIL 2.5 MG/1
2.5 TABLET ORAL DAILY
Qty: 90 TABLET | Refills: 0 | Status: SHIPPED | OUTPATIENT
Start: 2024-08-05

## 2024-08-05 RX ORDER — MECLIZINE HYDROCHLORIDE 25 MG/1
25 TABLET ORAL 3 TIMES DAILY PRN
Qty: 90 TABLET | Refills: 0 | Status: SHIPPED | OUTPATIENT
Start: 2024-08-05

## 2024-08-05 RX ORDER — LEVOTHYROXINE SODIUM 0.05 MG/1
50 TABLET ORAL DAILY
Qty: 90 TABLET | Refills: 0 | Status: SHIPPED | OUTPATIENT
Start: 2024-08-05

## 2024-08-12 RX ORDER — INSULIN LISPRO 100 [IU]/ML
10 INJECTION, SOLUTION INTRAVENOUS; SUBCUTANEOUS
Qty: 15 ML | Refills: 3 | Status: SHIPPED | OUTPATIENT
Start: 2024-08-12

## 2024-08-12 NOTE — TELEPHONE ENCOUNTER
REF   [id number]  PHONE: [phone]     Caller: [facility] - [place], [place] - [address] [phone] [facility] [phone] FX    Relationship: Pharmacy    Best call back number:     What medication are you requesting:       Insulin Lispro, 1 Unit Dial, (HumaLOG KwikPen) 100 UNIT/ML solution pen-injector     PATIENT STATED THAT DR INGRAM IS WRITING THIS FOR HER       Have you had these symptoms before:    [x] Yes  [] No    Have you been treated for these symptoms before:   [x] Yes  [] No    If a prescription is needed, what is your preferred pharmacy and phone number: [email or web address] - [place], [place] - [address] [phone] [facility] [phone] FX     Additional notes:

## 2024-08-16 ENCOUNTER — TELEPHONE (OUTPATIENT)
Dept: FAMILY MEDICINE CLINIC | Facility: CLINIC | Age: 74
End: 2024-08-16
Payer: MEDICARE

## 2024-08-24 PROCEDURE — 93005 ELECTROCARDIOGRAM TRACING: CPT

## 2024-08-24 PROCEDURE — 93005 ELECTROCARDIOGRAM TRACING: CPT | Performed by: EMERGENCY MEDICINE

## 2024-08-24 PROCEDURE — 99285 EMERGENCY DEPT VISIT HI MDM: CPT

## 2024-08-25 ENCOUNTER — APPOINTMENT (OUTPATIENT)
Dept: GENERAL RADIOLOGY | Facility: HOSPITAL | Age: 74
End: 2024-08-25
Payer: MEDICARE

## 2024-08-25 ENCOUNTER — APPOINTMENT (OUTPATIENT)
Dept: ULTRASOUND IMAGING | Facility: HOSPITAL | Age: 74
End: 2024-08-25
Payer: MEDICARE

## 2024-08-25 ENCOUNTER — HOSPITAL ENCOUNTER (OUTPATIENT)
Facility: HOSPITAL | Age: 74
Setting detail: OBSERVATION
Discharge: HOME | End: 2024-08-26
Attending: EMERGENCY MEDICINE | Admitting: INTERNAL MEDICINE
Payer: MEDICARE

## 2024-08-25 DIAGNOSIS — N17.9 ACUTE KIDNEY INJURY: ICD-10-CM

## 2024-08-25 DIAGNOSIS — R00.2 PALPITATIONS: Primary | ICD-10-CM

## 2024-08-25 LAB
ALBUMIN SERPL-MCNC: 4.7 G/DL (ref 3.5–5.2)
ALBUMIN/GLOB SERPL: 1.7 G/DL
ALP SERPL-CCNC: 85 U/L (ref 39–117)
ALT SERPL W P-5'-P-CCNC: 71 U/L (ref 1–33)
ANION GAP SERPL CALCULATED.3IONS-SCNC: 12.1 MMOL/L (ref 5–15)
ANION GAP SERPL CALCULATED.3IONS-SCNC: 15.4 MMOL/L (ref 5–15)
AST SERPL-CCNC: 57 U/L (ref 1–32)
BACTERIA UR QL AUTO: ABNORMAL /HPF
BASOPHILS # BLD AUTO: 0.03 10*3/MM3 (ref 0–0.2)
BASOPHILS NFR BLD AUTO: 0.3 % (ref 0–1.5)
BILIRUB SERPL-MCNC: 0.2 MG/DL (ref 0–1.2)
BILIRUB UR QL STRIP: NEGATIVE
BUN SERPL-MCNC: 37 MG/DL (ref 8–23)
BUN SERPL-MCNC: 40 MG/DL (ref 8–23)
BUN/CREAT SERPL: 20.7 (ref 7–25)
BUN/CREAT SERPL: 21.6 (ref 7–25)
CALCIUM SPEC-SCNC: 10.4 MG/DL (ref 8.6–10.5)
CALCIUM SPEC-SCNC: 9.6 MG/DL (ref 8.6–10.5)
CHLORIDE SERPL-SCNC: 96 MMOL/L (ref 98–107)
CHLORIDE SERPL-SCNC: 99 MMOL/L (ref 98–107)
CHLORIDE UR-SCNC: 49 MMOL/L
CLARITY UR: CLEAR
CO2 SERPL-SCNC: 25.6 MMOL/L (ref 22–29)
CO2 SERPL-SCNC: 25.9 MMOL/L (ref 22–29)
COLOR UR: YELLOW
CREAT SERPL-MCNC: 1.71 MG/DL (ref 0.57–1)
CREAT SERPL-MCNC: 1.93 MG/DL (ref 0.57–1)
CREAT UR-MCNC: 39.2 MG/DL
DEPRECATED RDW RBC AUTO: 50.4 FL (ref 37–54)
EGFRCR SERPLBLD CKD-EPI 2021: 26.7 ML/MIN/1.73
EGFRCR SERPLBLD CKD-EPI 2021: 30.9 ML/MIN/1.73
EOSINOPHIL # BLD AUTO: 0.11 10*3/MM3 (ref 0–0.4)
EOSINOPHIL NFR BLD AUTO: 0.9 % (ref 0.3–6.2)
ERYTHROCYTE [DISTWIDTH] IN BLOOD BY AUTOMATED COUNT: 15.4 % (ref 12.3–15.4)
GEN 5 2HR TROPONIN T REFLEX: 25 NG/L
GLOBULIN UR ELPH-MCNC: 2.7 GM/DL
GLUCOSE BLDC GLUCOMTR-MCNC: 128 MG/DL (ref 70–105)
GLUCOSE BLDC GLUCOMTR-MCNC: 156 MG/DL (ref 70–105)
GLUCOSE BLDC GLUCOMTR-MCNC: 195 MG/DL (ref 70–105)
GLUCOSE BLDC GLUCOMTR-MCNC: 207 MG/DL (ref 70–105)
GLUCOSE SERPL-MCNC: 162 MG/DL (ref 65–99)
GLUCOSE SERPL-MCNC: 188 MG/DL (ref 65–99)
GLUCOSE UR STRIP-MCNC: ABNORMAL MG/DL
HCT VFR BLD AUTO: 42.1 % (ref 34–46.6)
HGB BLD-MCNC: 13.6 G/DL (ref 12–15.9)
HGB UR QL STRIP.AUTO: ABNORMAL
HYALINE CASTS UR QL AUTO: ABNORMAL /LPF
IMM GRANULOCYTES # BLD AUTO: 0.05 10*3/MM3 (ref 0–0.05)
IMM GRANULOCYTES NFR BLD AUTO: 0.4 % (ref 0–0.5)
KETONES UR QL STRIP: NEGATIVE
LEUKOCYTE ESTERASE UR QL STRIP.AUTO: ABNORMAL
LYMPHOCYTES # BLD AUTO: 4.94 10*3/MM3 (ref 0.7–3.1)
LYMPHOCYTES NFR BLD AUTO: 41.8 % (ref 19.6–45.3)
MAGNESIUM SERPL-MCNC: 2.5 MG/DL (ref 1.6–2.4)
MCH RBC QN AUTO: 28.6 PG (ref 26.6–33)
MCHC RBC AUTO-ENTMCNC: 32.3 G/DL (ref 31.5–35.7)
MCV RBC AUTO: 88.4 FL (ref 79–97)
MONOCYTES # BLD AUTO: 0.59 10*3/MM3 (ref 0.1–0.9)
MONOCYTES NFR BLD AUTO: 5 % (ref 5–12)
NEUTROPHILS NFR BLD AUTO: 51.6 % (ref 42.7–76)
NEUTROPHILS NFR BLD AUTO: 6.11 10*3/MM3 (ref 1.7–7)
NITRITE UR QL STRIP: NEGATIVE
NRBC BLD AUTO-RTO: 0 /100 WBC (ref 0–0.2)
NT-PROBNP SERPL-MCNC: 178 PG/ML (ref 0–1800)
PH UR STRIP.AUTO: <=5 [PH] (ref 5–8)
PLATELET # BLD AUTO: 331 10*3/MM3 (ref 140–450)
PMV BLD AUTO: 9.7 FL (ref 6–12)
POTASSIUM SERPL-SCNC: 3.8 MMOL/L (ref 3.5–5.2)
POTASSIUM SERPL-SCNC: 3.8 MMOL/L (ref 3.5–5.2)
PROT SERPL-MCNC: 7.4 G/DL (ref 6–8.5)
PROT UR QL STRIP: NEGATIVE
QT INTERVAL: 364 MS
QTC INTERVAL: 449 MS
RBC # BLD AUTO: 4.76 10*6/MM3 (ref 3.77–5.28)
RBC # UR STRIP: ABNORMAL /HPF
REF LAB TEST METHOD: ABNORMAL
SODIUM SERPL-SCNC: 137 MMOL/L (ref 136–145)
SODIUM SERPL-SCNC: 137 MMOL/L (ref 136–145)
SODIUM UR-SCNC: 64 MMOL/L
SP GR UR STRIP: 1.01 (ref 1–1.03)
SQUAMOUS #/AREA URNS HPF: ABNORMAL /HPF
TROPONIN T DELTA: 1 NG/L
TROPONIN T SERPL HS-MCNC: 24 NG/L
UROBILINOGEN UR QL STRIP: ABNORMAL
WBC # UR STRIP: ABNORMAL /HPF
WBC NRBC COR # BLD AUTO: 11.83 10*3/MM3 (ref 3.4–10.8)

## 2024-08-25 PROCEDURE — 25810000003 SODIUM CHLORIDE 0.9 % SOLUTION

## 2024-08-25 PROCEDURE — 82948 REAGENT STRIP/BLOOD GLUCOSE: CPT

## 2024-08-25 PROCEDURE — 82570 ASSAY OF URINE CREATININE: CPT | Performed by: INTERNAL MEDICINE

## 2024-08-25 PROCEDURE — 81001 URINALYSIS AUTO W/SCOPE: CPT

## 2024-08-25 PROCEDURE — 96360 HYDRATION IV INFUSION INIT: CPT

## 2024-08-25 PROCEDURE — 83880 ASSAY OF NATRIURETIC PEPTIDE: CPT | Performed by: NURSE PRACTITIONER

## 2024-08-25 PROCEDURE — G0378 HOSPITAL OBSERVATION PER HR: HCPCS

## 2024-08-25 PROCEDURE — 71045 X-RAY EXAM CHEST 1 VIEW: CPT

## 2024-08-25 PROCEDURE — 36415 COLL VENOUS BLD VENIPUNCTURE: CPT

## 2024-08-25 PROCEDURE — 82436 ASSAY OF URINE CHLORIDE: CPT | Performed by: INTERNAL MEDICINE

## 2024-08-25 PROCEDURE — 63710000001 INSULIN GLARGINE PER 5 UNITS: Performed by: INTERNAL MEDICINE

## 2024-08-25 PROCEDURE — 80053 COMPREHEN METABOLIC PANEL: CPT | Performed by: NURSE PRACTITIONER

## 2024-08-25 PROCEDURE — 96361 HYDRATE IV INFUSION ADD-ON: CPT

## 2024-08-25 PROCEDURE — 25810000003 SODIUM CHLORIDE 0.9 % SOLUTION: Performed by: NURSE PRACTITIONER

## 2024-08-25 PROCEDURE — 63710000001 INSULIN LISPRO (HUMAN) PER 5 UNITS

## 2024-08-25 PROCEDURE — 84484 ASSAY OF TROPONIN QUANT: CPT | Performed by: NURSE PRACTITIONER

## 2024-08-25 PROCEDURE — 76775 US EXAM ABDO BACK WALL LIM: CPT

## 2024-08-25 PROCEDURE — 84300 ASSAY OF URINE SODIUM: CPT | Performed by: INTERNAL MEDICINE

## 2024-08-25 PROCEDURE — 83735 ASSAY OF MAGNESIUM: CPT | Performed by: NURSE PRACTITIONER

## 2024-08-25 PROCEDURE — 85025 COMPLETE CBC W/AUTO DIFF WBC: CPT | Performed by: NURSE PRACTITIONER

## 2024-08-25 RX ORDER — DEXTROSE MONOHYDRATE 25 G/50ML
25 INJECTION, SOLUTION INTRAVENOUS
Status: DISCONTINUED | OUTPATIENT
Start: 2024-08-25 | End: 2024-08-26 | Stop reason: HOSPADM

## 2024-08-25 RX ORDER — ACETAMINOPHEN 325 MG/1
650 TABLET ORAL EVERY 4 HOURS PRN
Status: DISCONTINUED | OUTPATIENT
Start: 2024-08-25 | End: 2024-08-26 | Stop reason: HOSPADM

## 2024-08-25 RX ORDER — PAROXETINE 20 MG/1
20 TABLET, FILM COATED ORAL DAILY
Status: DISCONTINUED | OUTPATIENT
Start: 2024-08-25 | End: 2024-08-26 | Stop reason: HOSPADM

## 2024-08-25 RX ORDER — ONDANSETRON 2 MG/ML
4 INJECTION INTRAMUSCULAR; INTRAVENOUS EVERY 6 HOURS PRN
Status: DISCONTINUED | OUTPATIENT
Start: 2024-08-25 | End: 2024-08-26 | Stop reason: HOSPADM

## 2024-08-25 RX ORDER — SODIUM CHLORIDE 450 MG/100ML
100 INJECTION, SOLUTION INTRAVENOUS CONTINUOUS
Status: DISCONTINUED | OUTPATIENT
Start: 2024-08-25 | End: 2024-08-26 | Stop reason: HOSPADM

## 2024-08-25 RX ORDER — IBUPROFEN 600 MG/1
1 TABLET ORAL
Status: DISCONTINUED | OUTPATIENT
Start: 2024-08-25 | End: 2024-08-26 | Stop reason: HOSPADM

## 2024-08-25 RX ORDER — BISACODYL 10 MG
10 SUPPOSITORY, RECTAL RECTAL DAILY PRN
Status: DISCONTINUED | OUTPATIENT
Start: 2024-08-25 | End: 2024-08-26 | Stop reason: HOSPADM

## 2024-08-25 RX ORDER — SODIUM CHLORIDE 0.9 % (FLUSH) 0.9 %
10 SYRINGE (ML) INJECTION AS NEEDED
Status: DISCONTINUED | OUTPATIENT
Start: 2024-08-25 | End: 2024-08-26 | Stop reason: HOSPADM

## 2024-08-25 RX ORDER — BISACODYL 5 MG/1
5 TABLET, DELAYED RELEASE ORAL DAILY PRN
Status: DISCONTINUED | OUTPATIENT
Start: 2024-08-25 | End: 2024-08-26 | Stop reason: HOSPADM

## 2024-08-25 RX ORDER — ROSUVASTATIN CALCIUM 10 MG/1
10 TABLET, COATED ORAL NIGHTLY
Status: DISCONTINUED | OUTPATIENT
Start: 2024-08-25 | End: 2024-08-26 | Stop reason: HOSPADM

## 2024-08-25 RX ORDER — SODIUM CHLORIDE 0.9 % (FLUSH) 0.9 %
10 SYRINGE (ML) INJECTION EVERY 12 HOURS SCHEDULED
Status: DISCONTINUED | OUTPATIENT
Start: 2024-08-25 | End: 2024-08-26 | Stop reason: HOSPADM

## 2024-08-25 RX ORDER — LEVOTHYROXINE SODIUM 50 UG/1
50 TABLET ORAL
Status: DISCONTINUED | OUTPATIENT
Start: 2024-08-25 | End: 2024-08-26 | Stop reason: HOSPADM

## 2024-08-25 RX ORDER — PANTOPRAZOLE SODIUM 40 MG/1
40 TABLET, DELAYED RELEASE ORAL DAILY
Status: DISCONTINUED | OUTPATIENT
Start: 2024-08-25 | End: 2024-08-26 | Stop reason: HOSPADM

## 2024-08-25 RX ORDER — POLYETHYLENE GLYCOL 3350 17 G/17G
17 POWDER, FOR SOLUTION ORAL DAILY PRN
Status: DISCONTINUED | OUTPATIENT
Start: 2024-08-25 | End: 2024-08-26 | Stop reason: HOSPADM

## 2024-08-25 RX ORDER — INSULIN LISPRO 100 [IU]/ML
2-9 INJECTION, SOLUTION INTRAVENOUS; SUBCUTANEOUS
Status: DISCONTINUED | OUTPATIENT
Start: 2024-08-25 | End: 2024-08-26 | Stop reason: HOSPADM

## 2024-08-25 RX ORDER — NITROGLYCERIN 0.4 MG/1
0.4 TABLET SUBLINGUAL
Status: DISCONTINUED | OUTPATIENT
Start: 2024-08-25 | End: 2024-08-26 | Stop reason: HOSPADM

## 2024-08-25 RX ORDER — AMOXICILLIN 250 MG
2 CAPSULE ORAL 2 TIMES DAILY PRN
Status: DISCONTINUED | OUTPATIENT
Start: 2024-08-25 | End: 2024-08-26 | Stop reason: HOSPADM

## 2024-08-25 RX ORDER — NICOTINE POLACRILEX 4 MG
15 LOZENGE BUCCAL
Status: DISCONTINUED | OUTPATIENT
Start: 2024-08-25 | End: 2024-08-26 | Stop reason: HOSPADM

## 2024-08-25 RX ORDER — ACETAMINOPHEN 650 MG/1
650 SUPPOSITORY RECTAL EVERY 4 HOURS PRN
Status: DISCONTINUED | OUTPATIENT
Start: 2024-08-25 | End: 2024-08-26 | Stop reason: HOSPADM

## 2024-08-25 RX ORDER — ACETAMINOPHEN 160 MG/5ML
650 SOLUTION ORAL EVERY 4 HOURS PRN
Status: DISCONTINUED | OUTPATIENT
Start: 2024-08-25 | End: 2024-08-26 | Stop reason: HOSPADM

## 2024-08-25 RX ORDER — SODIUM CHLORIDE 9 MG/ML
40 INJECTION, SOLUTION INTRAVENOUS AS NEEDED
Status: DISCONTINUED | OUTPATIENT
Start: 2024-08-25 | End: 2024-08-26 | Stop reason: HOSPADM

## 2024-08-25 RX ORDER — TOPIRAMATE 25 MG/1
25 TABLET, FILM COATED ORAL 2 TIMES DAILY
Status: DISCONTINUED | OUTPATIENT
Start: 2024-08-25 | End: 2024-08-26 | Stop reason: HOSPADM

## 2024-08-25 RX ORDER — SODIUM CHLORIDE 9 MG/ML
100 INJECTION, SOLUTION INTRAVENOUS CONTINUOUS
Status: DISCONTINUED | OUTPATIENT
Start: 2024-08-25 | End: 2024-08-25

## 2024-08-25 RX ADMIN — LEVOTHYROXINE SODIUM 50 MCG: 0.05 TABLET ORAL at 08:25

## 2024-08-25 RX ADMIN — SODIUM CHLORIDE 1000 ML: 9 INJECTION, SOLUTION INTRAVENOUS at 04:31

## 2024-08-25 RX ADMIN — SODIUM CHLORIDE 100 ML/HR: 4.5 INJECTION, SOLUTION INTRAVENOUS at 12:55

## 2024-08-25 RX ADMIN — INSULIN LISPRO 2 UNITS: 100 INJECTION, SOLUTION INTRAVENOUS; SUBCUTANEOUS at 21:40

## 2024-08-25 RX ADMIN — INSULIN GLARGINE 30 UNITS: 100 INJECTION, SOLUTION SUBCUTANEOUS at 21:41

## 2024-08-25 RX ADMIN — PAROXETINE HYDROCHLORIDE 20 MG: 20 TABLET, FILM COATED ORAL at 08:25

## 2024-08-25 RX ADMIN — TOPIRAMATE 25 MG: 25 TABLET, FILM COATED ORAL at 21:40

## 2024-08-25 RX ADMIN — APIXABAN 5 MG: 5 TABLET, FILM COATED ORAL at 21:40

## 2024-08-25 RX ADMIN — INSULIN LISPRO 4 UNITS: 100 INJECTION, SOLUTION INTRAVENOUS; SUBCUTANEOUS at 11:06

## 2024-08-25 RX ADMIN — Medication 10 ML: at 08:24

## 2024-08-25 RX ADMIN — INSULIN LISPRO 2 UNITS: 100 INJECTION, SOLUTION INTRAVENOUS; SUBCUTANEOUS at 16:54

## 2024-08-25 RX ADMIN — TOPIRAMATE 25 MG: 25 TABLET, FILM COATED ORAL at 08:25

## 2024-08-25 RX ADMIN — ROSUVASTATIN 10 MG: 10 TABLET, FILM COATED ORAL at 21:41

## 2024-08-25 RX ADMIN — SODIUM CHLORIDE 100 ML/HR: 9 INJECTION, SOLUTION INTRAVENOUS at 10:58

## 2024-08-25 RX ADMIN — APIXABAN 5 MG: 5 TABLET, FILM COATED ORAL at 08:25

## 2024-08-25 RX ADMIN — PANTOPRAZOLE SODIUM 40 MG: 40 TABLET, DELAYED RELEASE ORAL at 08:25

## 2024-08-25 RX ADMIN — SODIUM CHLORIDE 100 ML/HR: 9 INJECTION, SOLUTION INTRAVENOUS at 05:24

## 2024-08-25 NOTE — H&P
"[facility] Medicine Services  History & Physical    Patient Name: Lula Burnett  : 1950  MRN: [id number]  Primary Care Physician:  Yumiko Shah MD  Date of admission: 2024  Date and Time of Service: 2024 at 0440      Assessment & Plan      Chief Complaint: palpitations, decreased urination    Plan:    SHAVON  -Creatinine 1.94 (baseline 1.08), monitor  -IV fluids ordered  -Avoid nephrotoxic medication and contrast  -Avoid hypotension  -Strict I&O  -Consider Nephrology consult if creatinine does not improve    Diabetes Mellitus Type 2  -Accu-Cheks AC at bedtime  -SSI ordered    Home medications for chronic diseases will be restarted as appropriate after verification.      History of Present Illness     History of Present Illness: Lula Burnett is a 73 y.o. female with a previous medical history of DM, CVA, HTN, Afib who presented to [facility] on 2024 with complaints of palpitations, \"feeling crazy\" and decreased urination over the past month. She states that she is not dehydrated, she drinks plenty of \"water with electrolytes\".  She believes that the issue is that her Estrogen was discontinued by her PCP.  However, she does report feeling better after getting fluids in the ED.      In the ED, EKG showed SR, HR 92.  Troponin 24, 25, Creatinine 1.93 (baseline 1.08), glucose 162, WBC 11.83. Otherwise, labs are unremarkable. UA was ordered.  She is afebrile, all vitals are stable.   Hospitalist was consulted for further hydration and observation.    12 point ROS reviewed and negative except as mentioned above    Objective      Vitals:   Temp:  [97.9 °F (36.6 °C)] 97.9 °F (36.6 °C)  Heart Rate:  [74-99] 76  Resp:  [18] 18  BP: (136-146)/(56-71) 137/56  Body mass index is 29.52 kg/m².    Physical Exam  Vitals and nursing note reviewed.   Constitutional:       Appearance: Normal appearance.   HENT:      Mouth/Throat:      Mouth: Mucous membranes are moist. "   Cardiovascular:      Rate and Rhythm: Normal rate and regular rhythm.   Pulmonary:      Effort: Pulmonary effort is normal.      Breath sounds: Normal breath sounds.   Abdominal:      General: Bowel sounds are normal.      Palpations: Abdomen is soft.   Musculoskeletal:         General: Normal range of motion.   Skin:     General: Skin is warm and dry.   Neurological:      General: No focal deficit present.      Mental Status: She is alert and oriented to person, place, and time. Mental status is at baseline.   Psychiatric:         Mood and Affect: Mood normal.         Behavior: Behavior normal.        Personal History     This is a 73 y.o. female with:    Past Medical History:   Diagnosis Date    Anxiety     Asthma     COVID-19     Diagnosed with positive test 8/4/22.    CVA (cerebral vascular accident)     x3    Depression     Diabetic neuropathy     GERD (gastroesophageal reflux disease)     Hypothyroidism     Mitral stenosis     Mitral valve annular calcification     Peptic ulceration     PONV (postoperative nausea and vomiting)     Psoriasis     Vertigo        Past Surgical History:   Procedure Laterality Date    CARDIAC CATHETERIZATION      CARDIAC CATHETERIZATION N/A 02/07/2022    Procedure: Coronary angiography;  Surgeon: Eugene Lundy MD;  Location: [facility] INVASIVE LOCATION;  Service: Cardiovascular;  Laterality: N/A;    CARDIAC CATHETERIZATION N/A 02/07/2022    Procedure: Left heart cath;  Surgeon: Eugene Lundy MD;  Location: [facility] CATH INVASIVE LOCATION;  Service: Cardiovascular;  Laterality: N/A;    CARDIAC CATHETERIZATION N/A 02/07/2022    Procedure: Left ventriculography;  Surgeon: Eugene Lundy MD;  Location: [facility] CATH INVASIVE LOCATION;  Service: Cardiovascular;  Laterality: N/A;    CARDIAC ELECTROPHYSIOLOGY PROCEDURE N/A 02/07/2022    Procedure: LOOP INSERTION linq;  Surgeon: Eugene Lundy MD;  Location: [facility] CATH INVASIVE LOCATION;  Service: Cardiovascular;   Laterality: N/A;    CHOLECYSTECTOMY  02/2000    HYSTERECTOMY  1980       Active and Resolved Problems  Active Hospital Problems    Diagnosis  POA    **SHAVON (acute kidney injury) [N17.9]  Yes      Resolved Hospital Problems   No resolved problems to display.       Family History: family history includes Heart failure in her mother; Lung cancer in her father; Stroke in her sister. Otherwise pertinent FHx was reviewed and not pertinent to current issue.    Social History:  reports that she quit smoking about 13 years ago. Her smoking use included cigarettes. She started smoking about 46 years ago. She has a 33.1 pack-year smoking history. She has never used smokeless tobacco. She reports that she does not drink alcohol and does not use drugs.    Home Medications:  Prior to Admission Medications       Prescriptions Last Dose Informant Patient Reported? Taking?    apixaban (ELIQUIS) 5 MG tablet tablet   No No    Take 1 tablet by mouth Every 12 (Twelve) Hours. Indications: Atrial Fibrillation    aspirin 81 MG chewable tablet   No No    Chew 1 tablet Daily.    carvedilol (COREG) 6.25 MG tablet   No No    Take 1 tablet by mouth 2 (Two) Times a Day With Meals.    cetirizine (zyrTEC) 10 MG tablet   No No    TAKE 1 TABLET DAILY    cyclobenzaprine (FLEXERIL) 10 MG tablet   No No    Take 1 tablet by mouth 3 (Three) Times a Day As Needed for Muscle Spasms.    estrogens, conjugated, (PREMARIN) 1.25 MG tablet   No No    Take 1 tablet by mouth Daily.    Insulin Glargine (Lantus SoloStar) 100 UNIT/ML injection pen   No No    Inject 30 Units under the skin into the appropriate area as directed Every Night.    Insulin Lispro, 1 Unit Dial, (HumaLOG KwikPen) 100 UNIT/ML solution pen-injector   No No    Inject 10 Units under the skin into the appropriate area as directed Daily With Breakfast, Lunch & Dinner.    Insulin Pen Needle (B-D UF III MINI PEN NEEDLES) 31G X 5 MM misc   No No    1 Piece Daily.    Insulin Pen Needle (Pen Needles) 32G  X 4 MM misc   No No    Use 1 each 4 (Four) Times a Day Before Meals & at Bedtime.    levothyroxine (SYNTHROID, LEVOTHROID) 50 MCG tablet   No No    Take 1 tablet by mouth Daily.    lisinopril (Zestril) 2.5 MG tablet   No No    Take 1 tablet by mouth Daily.    meclizine (ANTIVERT) 25 MG tablet   No No    TAKE 1 TABLET THREE TIMES A DAY AS NEEDED FOR DIZZINESS    montelukast (SINGULAIR) 10 MG tablet   No No    TAKE 1 TABLET EVERY NIGHT    Multiple Vitamins-Minerals (CENTRUM FRESH/FRUITY 50+ PO)   Yes No    Take  by mouth.    nitroglycerin (NITROSTAT) 0.4 MG SL tablet   No No    1 under the tongue as needed for angina, may repeat q5mins for up three doses    ondansetron ODT (ZOFRAN-ODT) 8 MG disintegrating tablet   No No    PLACE 1 TABLET ON THE TONGUE EVERY 8 HOURS AS NEEDED FOR NAUSEA OR VOMITING    pantoprazole (PROTONIX) 40 MG EC tablet   No No    Take 1 tablet by mouth Daily.    PARoxetine (PAXIL) 20 MG tablet   No No    Take 1 tablet by mouth Daily.    potassium chloride (KLOR-CON M20) 20 MEQ CR tablet   No No    TAKE 1 TABLET DAILY    ProAir  (90 Base) MCG/ACT inhaler   No No    Inhale 2 puffs Every 4 (Four) Hours As Needed for Wheezing or Shortness of Air.    rosuvastatin (CRESTOR) 20 MG tablet   No No    Take 1 tablet by mouth Daily.    Synjardy XR  MG tablet sustained-release 24 hour   No No    Take 1 tablet by mouth Daily.    topiramate (TOPAMAX) 25 MG tablet   No No    TAKE 1 TABLET TWICE A DAY    triamterene-hydrochlorothiazide (MAXZIDE) 75-50 MG per tablet   No No    TAKE 1 TABLET DAILY    Turmeric 500 MG capsule   Yes No    Take  by mouth.              Allergies:  Allergies   Allergen Reactions    Codeine GI Intolerance           VTE Prophylaxis:  Mechanical VTE prophylaxis orders are present.        CODE STATUS:    Code Status (Patient has no pulse and is not breathing): CPR (Attempt to Resuscitate)  Medical Interventions (Patient has pulse or is breathing): Full Support        Admission  Status:  I believe this patient meets observation status.    I discussed the patient's findings and my recommendations with patient and family.    Signature:     This document has been electronically signed by Adilene Yuen DNP, APRN, AGACNP-BC on August 25, 2024 04:52 EDT   [facility] Hospitalist Team

## 2024-08-25 NOTE — PLAN OF CARE
Problem: Adult Inpatient Plan of Care  Goal: Plan of Care Review  Outcome: Ongoing, Progressing  Flowsheets (Taken 8/25/2024 0954)  Plan of Care Reviewed With: patient  Goal: Patient-Specific Goal (Individualized)  Outcome: Ongoing, Progressing  Goal: Absence of Hospital-Acquired Illness or Injury  Outcome: Ongoing, Progressing  Intervention: Identify and Manage Fall Risk  Recent Flowsheet Documentation  Taken 8/25/2024 0800 by Sabina Ramos RN  Safety Promotion/Fall Prevention:   activity supervised   assistive device/personal items within reach   clutter free environment maintained   fall prevention program maintained   nonskid shoes/slippers when out of bed   room organization consistent   safety round/check completed  Intervention: Prevent Skin Injury  Recent Flowsheet Documentation  Taken 8/25/2024 0800 by Sabina Ramos RN  Body Position:   position changed independently   supine   weight shifting  Skin Protection:   adhesive use limited   tubing/devices free from skin contact  Intervention: Prevent and Manage VTE (Venous Thromboembolism) Risk  Recent Flowsheet Documentation  Taken 8/25/2024 0800 by Sabina Ramos RN  Activity Management:   ambulated to bathroom   back to bed  VTE Prevention/Management:   bilateral   sequential compression devices off  Range of Motion: active ROM (range of motion) encouraged  Intervention: Prevent Infection  Recent Flowsheet Documentation  Taken 8/25/2024 0800 by Sabina Ramos RN  Infection Prevention:   hand hygiene promoted   equipment surfaces disinfected   environmental surveillance performed  Goal: Optimal Comfort and Wellbeing  Outcome: Ongoing, Progressing  Intervention: Provide Person-Centered Care  Recent Flowsheet Documentation  Taken 8/25/2024 0800 by Sabina Ramos RN  Trust Relationship/Rapport:   care explained   choices provided   emotional support provided   empathic listening provided   questions answered   questions encouraged   reassurance  provided   thoughts/feelings acknowledged  Goal: Readiness for Transition of Care  Outcome: Ongoing, Progressing     Problem: Asthma Comorbidity  Goal: Maintenance of Asthma Control  Outcome: Ongoing, Progressing  Intervention: Maintain Asthma Symptom Control  Recent Flowsheet Documentation  Taken 8/25/2024 0800 by Sabina Ramos RN  Medication Review/Management: medications reviewed     Problem: Behavioral Health Comorbidity  Goal: Maintenance of Behavioral Health Symptom Control  Outcome: Ongoing, Progressing  Intervention: Maintain Behavioral Health Symptom Control  Recent Flowsheet Documentation  Taken 8/25/2024 0800 by Sabina Ramos RN  Medication Review/Management: medications reviewed     Problem: Diabetes Comorbidity  Goal: Blood Glucose Level Within Targeted Range  Outcome: Ongoing, Progressing  Intervention: Monitor and Manage Glycemia  Recent Flowsheet Documentation  Taken 8/25/2024 0800 by Sabina Ramos RN  Glycemic Management: blood glucose monitored     Problem: Hypertension Comorbidity  Goal: Blood Pressure in Desired Range  Outcome: Ongoing, Progressing  Intervention: Maintain Blood Pressure Management  Recent Flowsheet Documentation  Taken 8/25/2024 0800 by Sabina Ramos RN  Medication Review/Management: medications reviewed   Goal Outcome Evaluation:  Plan of Care Reviewed With: patient            Patient a/ox4, up ad kacey, reviewed plan of care with patient, clean and dry, call light in reach, denies pain or needs at this time.   Nephrology consulted-Dr Hand on call r/t SHAVON.

## 2024-08-25 NOTE — NURSING NOTE
Son Sebastián would like  to call him about senior facility living placement. Order placed for consult,. Sebastián-son [phone] (Mobile

## 2024-08-25 NOTE — NURSING NOTE
Ingrid with lab called stating urine sample was never received.   Told Ingrid urine was in lab and showing processing, Ingrid, Ingrid states that urine is in lab, ingrid urine was labeled correctly but all urine labels have to be on the urine specimen and the extra urine order labels can not be in the bag with specimen, asked if Ingrid could add the microscopic urine to urine already running in lab from specimen sent this am, Ingrid states no all labels have to be attached to specimen and can not be in lab bag with specimen, Ingrid states he does not even acknowledge extra urine labels sent with urine sample. patient updated and will collect when able to give sample

## 2024-08-25 NOTE — CONSULTS
"Consults  [facility] Consult Note      Patient Name: Lula Burnett  : 1950  MRN: [id number]  Primary Care Physician:  Yumiko Shah MD  Referring Physician: No Known Provider  Date of admission: 2024    Subjective     Reason for Consult:  jon    HPI:   Lula Burnett is a 73 y.o. female admitted with malaise and feeling \"crazy.\"  She was found to have a creatinine level of 1.7 yesterday and 1.9 today prompting renal consultation. She has had many years of type 2 diabetes and has had some high blood sugar readings recently. She took nsaids in the past but none recently. She has had no n/v or diarrhea. She says she drinks plenty of water. She received one bag of ivf in the er and felt better afterward. She has no voiding symptoms, flank pain or gross hematuria.    Review of Systems:   14 point review of systems is otherwise negative except for mentioned above on HPI    Personal History     Past Medical History:   Diagnosis Date    Anxiety     Asthma     COVID-19     Diagnosed with positive test 22.    CVA (cerebral vascular accident)     x3    Depression     Diabetic neuropathy     GERD (gastroesophageal reflux disease)     Hypothyroidism     Mitral stenosis     Mitral valve annular calcification     Peptic ulceration     PONV (postoperative nausea and vomiting)     Psoriasis     Vertigo        Past Surgical History:   Procedure Laterality Date    CARDIAC CATHETERIZATION      CARDIAC CATHETERIZATION N/A 2022    Procedure: Coronary angiography;  Surgeon: Eugene Lundy MD;  Location: [facility] INVASIVE LOCATION;  Service: Cardiovascular;  Laterality: N/A;    CARDIAC CATHETERIZATION N/A 2022    Procedure: Left heart cath;  Surgeon: Eugene Lundy MD;  Location: [facility] INVASIVE LOCATION;  Service: Cardiovascular;  Laterality: N/A;    CARDIAC CATHETERIZATION N/A 2022    Procedure: Left ventriculography;  Surgeon: Nikkie" Eugene MOJICA MD;  Location:  [facility] CATH INVASIVE LOCATION;  Service: Cardiovascular;  Laterality: N/A;    CARDIAC ELECTROPHYSIOLOGY PROCEDURE N/A 02/07/2022    Procedure: LOOP INSERTION linq;  Surgeon: Eugene Lundy MD;  Location:  [facility] CATH INVASIVE LOCATION;  Service: Cardiovascular;  Laterality: N/A;    CHOLECYSTECTOMY  02/2000    HYSTERECTOMY  1980       Family History: family history includes Heart failure in her mother; Lung cancer in her father; Stroke in her sister.    Social History:  reports that she quit smoking about 13 years ago. Her smoking use included cigarettes. She started smoking about 46 years ago. She has a 33.1 pack-year smoking history. She has never used smokeless tobacco. She reports that she does not drink alcohol and does not use drugs.    Home Medications:  Prior to Admission medications    Medication Sig Start Date End Date Taking? Authorizing Provider   apixaban (ELIQUIS) 5 MG tablet tablet Take 1 tablet by mouth Every 12 (Twelve) Hours. Indications: Atrial Fibrillation 5/28/24  Yes Yumiko Shah MD   cetirizine (zyrTEC) 10 MG tablet TAKE 1 TABLET DAILY 6/3/24  Yes Yumiko Shah MD   cyclobenzaprine (FLEXERIL) 10 MG tablet Take 1 tablet by mouth 3 (Three) Times a Day As Needed for Muscle Spasms. 1/24/23  Yes Val Irvin MD   levothyroxine (SYNTHROID, LEVOTHROID) 50 MCG tablet Take 1 tablet by mouth Daily. 8/5/24  Yes Yumiko Shah MD   lisinopril (Zestril) 2.5 MG tablet Take 1 tablet by mouth Daily. 8/5/24  Yes Yumiko Shah MD   meclizine (ANTIVERT) 25 MG tablet TAKE 1 TABLET THREE TIMES A DAY AS NEEDED FOR DIZZINESS 8/5/24  Yes Yumiko Shah MD   montelukast (SINGULAIR) 10 MG tablet TAKE 1 TABLET EVERY NIGHT 6/3/24  Yes Yumiko Shah MD   ondansetron ODT (ZOFRAN-ODT) 8 MG disintegrating tablet PLACE 1 TABLET ON THE TONGUE EVERY 8 HOURS AS NEEDED FOR NAUSEA OR VOMITING 2/10/23  Yes Val Irvin MD    pantoprazole (PROTONIX) 40 MG EC tablet Take 1 tablet by mouth Daily. 3/29/24  Yes Yumiko Shah MD   PARoxetine (PAXIL) 20 MG tablet Take 1 tablet by mouth Daily. 3/29/24  Yes Yumiko Shah MD   potassium chloride (KLOR-CON M20) 20 MEQ CR tablet TAKE 1 TABLET DAILY 5/15/24  Yes Yumiko Shah MD   ProAir  (90 Base) MCG/ACT inhaler Inhale 2 puffs Every 4 (Four) Hours As Needed for Wheezing or Shortness of Air. 1/24/23  Yes Val Irvin MD   rosuvastatin (CRESTOR) 20 MG tablet Take 1 tablet by mouth Daily. 5/28/24  Yes Yumiko Shah MD   Synjardy XR  MG tablet sustained-release 24 hour Take 1 tablet by mouth Daily. 5/28/24  Yes Yumiko Shah MD   topiramate (TOPAMAX) 25 MG tablet TAKE 1 TABLET TWICE A DAY 8/5/24  Yes Yumiko Shah MD   triamterene-hydrochlorothiazide (MAXZIDE) 75-50 MG per tablet TAKE 1 TABLET DAILY 6/7/24  Yes Yumiko Shah MD   aspirin 81 MG chewable tablet Chew 1 tablet Daily. 12/5/23   Hao Christiansen MD   carvedilol (COREG) 6.25 MG tablet Take 1 tablet by mouth 2 (Two) Times a Day With Meals. 11/20/23   Val Irvin MD   estrogens, conjugated, (PREMARIN) 1.25 MG tablet Take 1 tablet by mouth Daily. 6/27/23   Val Irvin MD   Insulin Glargine (Lantus SoloStar) 100 UNIT/ML injection pen Inject 30 Units under the skin into the appropriate area as directed Every Night. 12/4/23   Hao Christiansen MD   Insulin Lispro, 1 Unit Dial, (HumaLOG KwikPen) 100 UNIT/ML solution pen-injector Inject 10 Units under the skin into the appropriate area as directed Daily With Breakfast, Lunch & Dinner. 8/12/24   Yumiko Shah MD   Insulin Pen Needle (B-D UF III MINI PEN NEEDLES) 31G X 5 MM misc 1 Piece Daily. 4/10/23   Val Irvin MD   Insulin Pen Needle (Pen Needles) 32G X 4 MM misc Use 1 each 4 (Four) Times a Day Before Meals & at Bedtime. 12/4/23   Hao Christiansen MD   Multiple  Vitamins-Minerals (CENTRUM FRESH/FRUITY 50+ PO) Take  by mouth.    Provider, MD Rey   nitroglycerin (NITROSTAT) 0.4 MG SL tablet 1 under the tongue as needed for angina, may repeat q5mins for up three doses 5/14/24   Yumiko hSah MD   Turmeric 500 MG capsule Take  by mouth.    Provider, MD Rey       Allergies:  Allergies   Allergen Reactions    Codeine GI Intolerance       Objective     Vitals:   Temp:  [97.8 °F (36.6 °C)-97.9 °F (36.6 °C)] 97.8 °F (36.6 °C)  Heart Rate:  [65-99] 65  Resp:  [16-26] 17  BP: (136-152)/(56-84) 138/84    Intake/Output Summary (Last 24 hours) at 8/25/2024 1242  Last data filed at 8/25/2024 1200  Gross per 24 hour   Intake 1620 ml   Output 600 ml   Net 1020 ml       Physical Exam:    General Appearance: alert, oriented x 3, no acute distress   Skin: warm and dry  HEENT: oral mucosa normal, nonicteric sclera  Neck: supple, no JVD  Lungs: CTA  Heart: RRR, normal S1 and S2  Abdomen: soft, nontender, nondistended  : no palpable bladder  Extremities: no edema, cyanosis or clubbing  Neuro: normal speech and mental status     Scheduled Meds:     apixaban, 5 mg, Oral, Q12H  insulin glargine, 30 Units, Subcutaneous, Nightly  insulin lispro, 2-9 Units, Subcutaneous, 4x Daily AC & at Bedtime  levothyroxine, 50 mcg, Oral, Q AM  pantoprazole, 40 mg, Oral, Daily  PARoxetine, 20 mg, Oral, Daily  rosuvastatin, 10 mg, Oral, Nightly  sodium chloride, 10 mL, Intravenous, Q12H  topiramate, 25 mg, Oral, BID      IV Meds:   sodium chloride, 100 mL/hr, Last Rate: 100 mL/hr (08/25/24 1200)        Results Reviewed:   I have personally reviewed the results from the time of this admission to 8/25/2024 12:42 EDT     Lab Results   Component Value Date    GLUCOSE 188 (H) 08/25/2024    CALCIUM 9.6 08/25/2024     08/25/2024    K 3.8 08/25/2024    CO2 25.9 08/25/2024    CL 99 08/25/2024    BUN 37 (H) 08/25/2024    CREATININE 1.71 (H) 08/25/2024    EGFRIFAFRI 62 02/04/2022    EGFRIFNONA 51  (L) 02/04/2022    BCR 21.6 08/25/2024    ANIONGAP 12.1 08/25/2024      Lab Results   Component Value Date    MG 2.5 (H) 08/25/2024    ALBUMIN 4.7 08/25/2024           Assessment / Plan     ASSESSMENT:  Eusebio-suspect due to prerenal azotemia, agree with checking a renal us and ivf  Hypovolemia-has received some ivf,  will need more  Benign hypertension-controlled, was on maxzide which has been appropriately held  Type 2 diabetes-no proteinuria on urinalysis    PLAN:  Avoid nephrotoxins  Renally dose medicines  Check a renal us  Resume iv 1/2ns  Check lab in am  Outpatient follow up with us    Thank you for involving us in the care of Lula Burnett.  Please feel free to call with any questions.    Guille Hand MD  08/25/24  12:42 EDT    [facility]  [phone]

## 2024-08-25 NOTE — ED NOTES
Pt was taken off of estrogen 1 month ago by PCP, pt states since being taken off medications that she has been having palpations, headaches, hot flashes, and problems with her bowels. Pt states that she wants to resume her estrogen medication.

## 2024-08-25 NOTE — ED PROVIDER NOTES
"Subjective   Chief Complaint   Patient presents with    Palpitations       History of Present Illness  Patient is a 73 presents the ED with a complaint of heart palpations.  She reports he should have any symptoms over the past month, she reports that she is \"going crazy\" because she is not had her premarin.  This has been 1 month.     She reports her PCP stopped her primary and secondary to her age.    Patient denies any chest pain, syncope or diaphoresis.  No fevers.  She does endorse she has been urinating less  Review of Systems  Per HPI  Past Medical History:   Diagnosis Date    Anxiety     Asthma     COVID-19     Diagnosed with positive test 8/4/22.    CVA (cerebral vascular accident)     x3    Depression     Diabetic neuropathy     GERD (gastroesophageal reflux disease)     Hypothyroidism     Mitral stenosis     Mitral valve annular calcification     Peptic ulceration     PONV (postoperative nausea and vomiting)     Psoriasis     Vertigo        Allergies   Allergen Reactions    Codeine GI Intolerance       Past Surgical History:   Procedure Laterality Date    CARDIAC CATHETERIZATION      CARDIAC CATHETERIZATION N/A 02/07/2022    Procedure: Coronary angiography;  Surgeon: Eugene Lundy MD;  Location: [facility] CATH INVASIVE LOCATION;  Service: Cardiovascular;  Laterality: N/A;    CARDIAC CATHETERIZATION N/A 02/07/2022    Procedure: Left heart cath;  Surgeon: Eugene Lundy MD;  Location: [facility] CATH INVASIVE LOCATION;  Service: Cardiovascular;  Laterality: N/A;    CARDIAC CATHETERIZATION N/A 02/07/2022    Procedure: Left ventriculography;  Surgeon: Eugene Lundy MD;  Location: [facility] CATH INVASIVE LOCATION;  Service: Cardiovascular;  Laterality: N/A;    CARDIAC ELECTROPHYSIOLOGY PROCEDURE N/A 02/07/2022    Procedure: LOOP INSERTION linq;  Surgeon: Eugene Lundy MD;  Location: [facility] CATH INVASIVE LOCATION;  Service: Cardiovascular;  Laterality: N/A;    CHOLECYSTECTOMY  02/2000    " HYSTERECTOMY  1980       Family History   Problem Relation Age of Onset    Heart failure Mother     Lung cancer Father     Stroke Sister     Breast cancer Neg Hx     Ovarian cancer Neg Hx        Social History     Socioeconomic History    Marital status:    Tobacco Use    Smoking status: Former     Current packs/day: 0.00     Average packs/day: 1 pack/day for 33.1 years (33.1 ttl pk-yrs)     Types: Cigarettes     Start date:      Quit date: 2011     Years since quittin.5    Smokeless tobacco: Never    Tobacco comments:     Quit around    Vaping Use    Vaping status: Never Used   Substance and Sexual Activity    Alcohol use: Never    Drug use: Never    Sexual activity: Not Currently           Objective   Physical Exam  Vitals and nursing note reviewed.   Constitutional:       Appearance: Normal appearance.   HENT:      Head: Normocephalic and atraumatic.      Nose: Nose normal.      Mouth/Throat:      Mouth: Mucous membranes are moist.      Pharynx: Oropharynx is clear.   Eyes:      Extraocular Movements: Extraocular movements intact.      Conjunctiva/sclera: Conjunctivae normal.      Pupils: Pupils are equal, round, and reactive to light.   Cardiovascular:      Rate and Rhythm: Normal rate and regular rhythm.      Heart sounds: Normal heart sounds.   Pulmonary:      Effort: Pulmonary effort is normal.      Breath sounds: Normal breath sounds.   Abdominal:      General: Bowel sounds are normal.      Palpations: Abdomen is soft.   Musculoskeletal:      Cervical back: Normal range of motion and neck supple.   Skin:     General: Skin is warm and dry.      Capillary Refill: Capillary refill takes less than 2 seconds.   Neurological:      Mental Status: She is alert and oriented to person, place, and time.         Procedures           ED Course  ED Course as of 24 1909   Sun Aug 25, 2024   1583 Discussed with Adilene INGRAM [LB]      ED Course User Index  [LB] Peggy Larkin APRN                                              Medical Decision Making  Amount and/or Complexity of Data Reviewed  Labs: ordered.  Radiology: ordered.  ECG/medicine tests: ordered.    Risk  Prescription drug management.  Decision regarding hospitalization.      Imaging:US Renal Bilateral    Result Date: 8/25/2024  Impression: No hydronephrosis. Electronically Signed: Valdemar Ferrer  8/25/2024 4:42 PM EDT  Workstation ID: [id number]    XR Chest 1 View    Result Date: 8/25/2024  Impression: No acute cardiopulmonary process. Electronically Signed: Carly Meredith MD  8/25/2024 1:35 AM EDT  Workstation ID: [id number]     EKG interpreted independently per ED attending physican-sinus rhythm rate of 92 compared to previous 12/16/2023.  Pt was Placed on appropriate monitoring.  Differential diagnoses considered for patient presentation, this list is not all inclusive of diagnoses considered: Electrolyte imbalance, arrhythmia  Patient presents to the ED for the above complaint, underwent the above, exam and workup.  Notable for troponin 24 and 25 which is similar to patient's previous.  BNP is normal.  She is not having acute kidney injury with a creatinine of 1.9, BUN 40.  Magnesium 2.5.  Patient started on hydration, her urine shows signs of infection.  EKG shows no acute changes.  Given her acute kidney injury, palpitations will admit to hospitalist for further evaluation and management.      Discussed with NATALY Head  Disposition: I discussed with the patient their test results, work-up here in the emergency department, and need for admission and further evaluation. Patient is agreeable to the plan of care. At time of disposition patient's VS are reviewed, and patient without acute distress.  Opportunity was provided for questions at the bedside, all questions and concerns were addressed.  Note Disclaimer: At [facility], we believe that sharing information builds trust and better relationships. You are receiving this note  because you recently visited [facility]. It is possible you will see health information before a provider has talked with you about it. This kind of information can be easy to misunderstand. To help you fully understand what it means for your health, we urge you to discuss this note with your provider  Note dictated utilizing Dragon Dictation.  Appropriate PPE worn during patient interactions.        Final diagnoses:   Palpitations   Acute kidney injury       ED Disposition  ED Disposition       ED Disposition   Decision to Admit    Condition   --    Comment   Level of Care: Telemetry [5]   Diagnosis: SHAVON (acute kidney injury) [246869]                 No follow-up provider specified.       Medication List      No changes were made to your prescriptions during this visit.            Peggy Larkin, APRN  08/25/24 [id number]

## 2024-08-25 NOTE — PLAN OF CARE
Patient seen and examined, please refer to HPI that was done earlier this morning     she has been diabetic for a long time, according to her she is to take ibuprofen a lot however she quit completely almost a year ago, denied having any hematuria no previous history of nephrolithiasis no urgency frequency or any other  complaints    -Continue holding lisinopril along with Maxzide  -Continue with IV fluid   -blood pressure seems to be under acceptable control if needed will add calcium channel blockers  -Check urine analysis  -Check urine electrolyte  -Check UPCR  -Check renal ultrasound  -Check iPTH, vitamin D and phosphorus in a.m.  -Patient baseline creatinine1.21.3 with CKD stage III, will definitely benefit from establishment with nephrology as an outpatient for which we will consult nephrology here to help with SHAVON and for patient to be established in the outpatient setting too.

## 2024-08-26 ENCOUNTER — READMISSION MANAGEMENT (OUTPATIENT)
Dept: CALL CENTER | Facility: HOSPITAL | Age: 74
End: 2024-08-26
Payer: MEDICARE

## 2024-08-26 VITALS
WEIGHT: 168.21 LBS | HEART RATE: 73 BPM | BODY MASS INDEX: 30.95 KG/M2 | DIASTOLIC BLOOD PRESSURE: 61 MMHG | RESPIRATION RATE: 13 BRPM | HEIGHT: 62 IN | SYSTOLIC BLOOD PRESSURE: 127 MMHG | OXYGEN SATURATION: 98 % | TEMPERATURE: 97.8 F

## 2024-08-26 LAB
25(OH)D3 SERPL-MCNC: 41.8 NG/ML (ref 30–100)
ALBUMIN SERPL-MCNC: 4.4 G/DL (ref 3.5–5.2)
ANION GAP SERPL CALCULATED.3IONS-SCNC: 14.6 MMOL/L (ref 5–15)
BUN SERPL-MCNC: 35 MG/DL (ref 8–23)
BUN/CREAT SERPL: 22.7 (ref 7–25)
CALCIUM SPEC-SCNC: 9.4 MG/DL (ref 8.6–10.5)
CHLORIDE SERPL-SCNC: 103 MMOL/L (ref 98–107)
CO2 SERPL-SCNC: 20.4 MMOL/L (ref 22–29)
CREAT SERPL-MCNC: 1.54 MG/DL (ref 0.57–1)
DEPRECATED RDW RBC AUTO: 51.9 FL (ref 37–54)
EGFRCR SERPLBLD CKD-EPI 2021: 35.1 ML/MIN/1.73
ERYTHROCYTE [DISTWIDTH] IN BLOOD BY AUTOMATED COUNT: 15.5 % (ref 12.3–15.4)
GLUCOSE BLDC GLUCOMTR-MCNC: 101 MG/DL (ref 70–105)
GLUCOSE BLDC GLUCOMTR-MCNC: 117 MG/DL (ref 70–105)
GLUCOSE SERPL-MCNC: 101 MG/DL (ref 65–99)
HBA1C MFR BLD: 9.12 % (ref 4.8–5.6)
HCT VFR BLD AUTO: 39.8 % (ref 34–46.6)
HGB BLD-MCNC: 12.4 G/DL (ref 12–15.9)
MCH RBC QN AUTO: 28.2 PG (ref 26.6–33)
MCHC RBC AUTO-ENTMCNC: 31.2 G/DL (ref 31.5–35.7)
MCV RBC AUTO: 90.7 FL (ref 79–97)
PHOSPHATE SERPL-MCNC: 4.1 MG/DL (ref 2.5–4.5)
PLATELET # BLD AUTO: 279 10*3/MM3 (ref 140–450)
PMV BLD AUTO: 9.7 FL (ref 6–12)
POTASSIUM SERPL-SCNC: 4.1 MMOL/L (ref 3.5–5.2)
PTH-INTACT SERPL-MCNC: 65 PG/ML (ref 15–65)
RBC # BLD AUTO: 4.39 10*6/MM3 (ref 3.77–5.28)
SODIUM SERPL-SCNC: 138 MMOL/L (ref 136–145)
WBC NRBC COR # BLD AUTO: 9.38 10*3/MM3 (ref 3.4–10.8)

## 2024-08-26 PROCEDURE — 83036 HEMOGLOBIN GLYCOSYLATED A1C: CPT | Performed by: INTERNAL MEDICINE

## 2024-08-26 PROCEDURE — 82306 VITAMIN D 25 HYDROXY: CPT | Performed by: INTERNAL MEDICINE

## 2024-08-26 PROCEDURE — 80069 RENAL FUNCTION PANEL: CPT | Performed by: INTERNAL MEDICINE

## 2024-08-26 PROCEDURE — G0378 HOSPITAL OBSERVATION PER HR: HCPCS

## 2024-08-26 PROCEDURE — 83970 ASSAY OF PARATHORMONE: CPT | Performed by: INTERNAL MEDICINE

## 2024-08-26 PROCEDURE — 82948 REAGENT STRIP/BLOOD GLUCOSE: CPT

## 2024-08-26 PROCEDURE — 85027 COMPLETE CBC AUTOMATED: CPT

## 2024-08-26 RX ORDER — APIXABAN 5 MG/1
TABLET, FILM COATED ORAL
Qty: 180 TABLET | Refills: 3 | Status: SHIPPED | OUTPATIENT
Start: 2024-08-26

## 2024-08-26 RX ADMIN — APIXABAN 5 MG: 5 TABLET, FILM COATED ORAL at 09:08

## 2024-08-26 RX ADMIN — LEVOTHYROXINE SODIUM 50 MCG: 0.05 TABLET ORAL at 06:09

## 2024-08-26 RX ADMIN — Medication 10 ML: at 09:10

## 2024-08-26 RX ADMIN — PANTOPRAZOLE SODIUM 40 MG: 40 TABLET, DELAYED RELEASE ORAL at 09:08

## 2024-08-26 RX ADMIN — TOPIRAMATE 25 MG: 25 TABLET, FILM COATED ORAL at 09:08

## 2024-08-26 RX ADMIN — PAROXETINE HYDROCHLORIDE 20 MG: 20 TABLET, FILM COATED ORAL at 09:08

## 2024-08-26 NOTE — PROGRESS NOTES
[facility] Progress Note      Patient Name: Lula Burnett  : 1950  MRN: [id number]  Primary Care Physician:  Yumiko Shah MD  Date of admission: 2024    Subjective     Interval History:     Patient resting comfortably.  Feeling better  Denies any chest pain, shortness of breath, nausea or vomiting    Review of Systems:   As noted above    Objective     Vitals:   Temp:  [97.4 °F (36.3 °C)-97.8 °F (36.6 °C)] 97.4 °F (36.3 °C)  Heart Rate:  [63-93] 63  Resp:  [15-17] 17  BP: (131-159)/(57-84) 159/76    Intake/Output Summary (Last 24 hours) at 2024 1037  Last data filed at 2024 1800  Gross per 24 hour   Intake 2390 ml   Output 600 ml   Net 1790 ml       Physical Exam:    General Appearance: NAD  HEENT: oral mucosa normal, nonicteric sclera  Neck: supple, no JVD  Lungs: CTA  Heart: RRR, normal S1 and S2  Abdomen: soft, nondistended  Extremities: no edema  Neuro: Awake alert and moving all extremities    Scheduled Meds:     apixaban, 5 mg, Oral, Q12H  insulin glargine, 30 Units, Subcutaneous, Nightly  insulin lispro, 2-9 Units, Subcutaneous, 4x Daily AC & at Bedtime  levothyroxine, 50 mcg, Oral, Q AM  pantoprazole, 40 mg, Oral, Daily  PARoxetine, 20 mg, Oral, Daily  rosuvastatin, 10 mg, Oral, Nightly  sodium chloride, 10 mL, Intravenous, Q12H  topiramate, 25 mg, Oral, BID      IV Meds:   sodium chloride, 100 mL/hr, Last Rate: 100 mL/hr (24 1800)        Results Reviewed:   I have personally reviewed the results from the time of this admission to 2024 10:37 EDT     Results from last 7 days   Lab Units 24  0416 24  0917 24  0114   SODIUM mmol/L 138 137 137   POTASSIUM mmol/L 4.1 3.8 3.8   CHLORIDE mmol/L 103 99 96*   CO2 mmol/L 20.4* 25.9 25.6   BUN mg/dL 35* 37* 40*   CREATININE mg/dL 1.54* 1.71* 1.93*   CALCIUM mg/dL 9.4 9.6 10.4   BILIRUBIN mg/dL  --   --  0.2   ALK PHOS U/L  --   --  85   ALT (SGPT) U/L  --   --  71*   AST  (SGOT) U/L  --   --  57*   GLUCOSE mg/dL 101* 188* 162*     Estimated Creatinine Clearance: 30.2 mL/min (A) (by C-G formula based on SCr of 1.54 mg/dL (H)).  Results from last 7 days   Lab Units 08/26/24  0416 08/25/24  0114   MAGNESIUM mg/dL  --  2.5*   PHOSPHORUS mg/dL 4.1  --          Results from last 7 days   Lab Units 08/26/24  0416 08/25/24  0114   WBC 10*3/mm3 9.38 11.83*   HEMOGLOBIN g/dL 12.4 13.6   PLATELETS 10*3/mm3 279 331           Assessment / Plan     ASSESSMENT:    Acute kidney injury most likely prerenal in etiology.  Renal function improving with IV hydration.  Creatinine decreased to 1.5 mg/DL from peak of 1.9 mg/DL.  Electrolytes, volume status okay  Chronic kidney disease stage IIIa.  Patient has underlying CKD in setting of hypertension, diabetes and previous NSAID use with baseline creatinine around 1.2 mg/DL  Hypertension with chronic kidney disease  Hypovolemia.  Improving    PLAN:    Continue IV fluids  Okay to restart low-dose ACE inhibitor on discharge  Follow-up in 2-3 months on discharge    Tereso Beal MD  08/26/24  10:37 EDT    [facility]  [phone]

## 2024-08-26 NOTE — CASE MANAGEMENT/SOCIAL WORK
Discharge Planning Assessment   Roger     Patient Name: Lula Burnett  MRN: [id number]  Today's Date: 8/26/2024    Admit Date: 8/25/2024    Plan: Home with self. Son Sebastián will transport at d/c   Discharge Needs Assessment       Row Name 08/26/24 1218       Living Environment    People in Home alone    Current Living Arrangements home;other (see comments)  lives on son's prperty in RV    Potentially Unsafe Housing Conditions none    In the past 12 months has the electric, gas, oil, or water company threatened to shut off services in your home? No    Primary Care Provided by self    Provides Primary Care For no one    Family Caregiver if Needed child(lesly), adult    Family Caregiver Names Oleg Lowery    Quality of Family Relationships helpful;involved;supportive    Able to Return to Prior Arrangements yes       Resource/Environmental Concerns    Resource/Environmental Concerns none    Transportation Concerns none       Transportation Needs    In the past 12 months, has lack of transportation kept you from medical appointments or from getting medications? no    In the past 12 months, has lack of transportation kept you from meetings, work, or from getting things needed for daily living? No       Food Insecurity    Within the past 12 months, you worried that your food would run out before you got the money to buy more. Never true    Within the past 12 months, the food you bought just didn't last and you didn't have money to get more. Never true       Transition Planning    Patient/Family Anticipates Transition to home with family    Patient/Family Anticipated Services at Transition none    Transportation Anticipated family or friend will provide       Discharge Needs Assessment    Readmission Within the Last 30 Days no previous admission in last 30 days    Equipment Currently Used at Home grab bar;glucometer;nebulizer;bath bench    Concerns to be Addressed no discharge needs identified    Anticipated Changes Related  to Illness none    Equipment Needed After Discharge none              Discharge Plan       Row Name 08/26/24 1222       Plan    Plan Home with self. Oleg Lowery will transport at d/c    Patient/Family in Agreement with Plan yes    Plan Comments CM met with pt at bedside. Confirmed PCP and pharmacy, pt not enrolled in M2Bs. Pt lives in an RV on son's property. Pt IADLs, uses glucommeter no ambulation assistive devices. Pt does not currently have HHC/PT/OT and denies needing services at d/c. Pt denies needing financial assistance with food, medication, or utilities. Pt and son are looking into Assisted living facilities. CM spoke to stephany Lowery who confirms pt is IADLs, just needs to be in an AL facility where she can be around people and have someone looking out for her. Pt has d/c orders. Oleg Lowery will transport at d/c.             Expected Discharge Date and Time       Expected Discharge Date Expected Discharge Time    Aug 26, 2024            Demographic Summary       Row Name 08/26/24 1215       General Information    Admission Type observation    Arrived From emergency department    Required Notices Provided Observation Status Notice    Referral Source admission list    Reason for Consult discharge planning    Preferred Language English       Contact Information    Permission Granted to Share Info With               Functional Status       Row Name 08/26/24 1215       Functional Status    Usual Activity Tolerance moderate    Current Activity Tolerance moderate       Functional Status, IADL    Medications independent    Meal Preparation independent    Housekeeping independent    Laundry independent    Shopping independent       Mental Status    General Appearance WDL WDL       Mental Status Summary    Recent Changes in Mental Status/Cognitive Functioning no changes       Employment/    Employment Status retired              Patient Forms       Row Name 08/26/24 1211       Patient Forms    Important  Message from Medicare ([facility]) --  IGLESIA 8/26/24 per CM    Patient Observation Letter Delivered    Delivered to Patient    Method of delivery In person           Lianet Pulido RN    phone [phone]  fax [phone]

## 2024-08-26 NOTE — CONSULTS
"Diabetes Education  Assessment/Teaching    Patient Name:  Lula Burnett  YOB: 1950  MRN: [id number]  Admit Date:  8/25/2024      Assessment Date:  8/26/2024  Flowsheet Row Most Recent Value   General Information     Referral From: MD order  [Consult received per database referral. A1c this adm 9.12%. Adm bs 162.]   Height 157.5 cm (62.01\")   Height Method Stated   Weight 76.3 kg (168 lb 3.4 oz)   Weight Method Bed scale   Pregnancy Assessment    Diabetes History    What type of diabetes do you have? Type 2   Length of Diabetes Diagnosis --  [Dx 16 years ago.]   Have you had diabetes education/teaching in the past? yes   When and where was your diabetes education? Was last seen by inpatient diabetes educator at [facility] on 12/18/2023.   Do you test your blood sugar at home? yes   Frequency of checks 3-4 times/day   Meter type Freestyle Lite, about 3 years old   Who performs the test? self   Have you had low blood sugar? (<70mg/dl) no   Education Preferences    Nutrition Information    Assessment Topics    DM Goals             Flowsheet Row Most Recent Value   DM Education Needs    Meter Has own   Meter Type Freestyle   Frequency of Testing 3 times a day  [Discussed importance of recording bs and taking readings to PCP visits. Discussed PCP would want to review individual readings for possible insulin dosage adjustments.]   Blood Glucose Target Range Discussed A1c result of 9.12% this adm. Discussed previous A1c result from 5/14/2024 of 8.5%. Discussed healthy bs range and healthy A1c target. Discussed importance of bs control.   Medication Insulin, Oral, Pen  [Pt taking Lantus 30 units hs, Humalog 12 units ac, and Synjardy XR 25/1000 daily. Pt states taking meds as prescribed.]   Problem Solving Hypoglycemia, Hyperglycemia, Signs, Symptoms, Treatment  [Discussed importance of keeping low bs tx with her at all times.]   Reducing Risks A1C testing  [Gave A1c info sheet.]   Healthy Eating --  [Pt eating " 3 meals/day. Pt states she does not drink sugared beverages. Pt likes drinking water or unsweetened tea.]   Motivation Engaged   Teaching Method Explanation, Discussion, Handouts   Patient Response Verbalized understanding              Other Comments:  Met with pt at bedside. Pt states has PCP and sees every 3 months. Pt states she does not know why bs runs higher sometimes. She states even when not changing eating habits, bs will run higher or lower. Discussed importance of PCP reviewing bs readings.    Asked pt where she stores her insulin. Pt states she does keep insulin in refrigerator. Pt takes insulin pen out of refrigerator and allows it to come to room temp before giving the injection. She states after injection, she puts pen back in refrigerator. Discussed pens in use should be kept at room temp and should not be exposed to extremes in temperature. Pt lives on son's property in an . Pt states she is checking into going to a senior living facility.     Pt states she is able to give own insulin injections and check own bs. Pt states she has all necessary supplies at home for diabetes management. Pt states not needing additional info at this time.       Electronically signed by:  Sveta Desouza RN  08/26/24 13:43 EDT

## 2024-08-26 NOTE — DISCHARGE SUMMARY
[facility] Medicine Services  Discharge Summary    Date of Service: 2024  Patient Name: Lula Burnett  : 1950  MRN: [id number]    Date of Admission: 2024  Discharge Diagnosis:   SHAVON on CKD stage III  DM type II, controlled  Hypertension  A-fib    Date of Discharge: 2024  Primary Care Physician: Yumiko Shah MD      Presenting Problem:   SHAVON (acute kidney injury) [N17.9]    Active and Resolved Hospital Problems:  Active Hospital Problems    Diagnosis POA    **SHAVON (acute kidney injury) [N17.9] Yes      Resolved Hospital Problems   No resolved problems to display.         Hospital Course     HPI:  Patient is a 73-year-old female who presented to the hospital with complaints of palpitations and general malaise.  Please see H&P for details.    Hospital Course:  The patient was found to be in acute renal failure on admission.  She has a known history of mild CKD although she has not been following up with a nephrologist as an outpatient.  She is on Maxide as well as lisinopril which likely led to her acute renal failure.  These medications were held and she was hydrated with IV fluids.  Renal function improved close to her baseline.  She was evaluated by nephrology and will follow-up with them as an outpatient.  On discharge, she will discontinue her Maxide but continue low-dose lisinopril.  She is stable for discharge.        DISCHARGE Follow Up Recommendations for labs and diagnostics: Follow-up with nephrology in 1 to 2 months.      Reasons For Change In Medications and Indications for New Medications:      Day of Discharge     Vital Signs:  Temp:  [97.4 °F (36.3 °C)-97.6 °F (36.4 °C)] 97.4 °F (36.3 °C)  Heart Rate:  [63-93] 63  Resp:  [15-17] 17  BP: (131-159)/(57-76) 159/76    Physical Exam:  Physical Exam   General Appearance:  Alert, cooperative, no distress, appears stated age  Head:  Normocephalic, without obvious abnormality, atraumatic  Eyes:  PERRL,  conjunctiva/corneas clear, EOM's intact, fundi benign, both eyes  Ears:  Normal TM's and external ear canals, both ears  Nose: Nares normal, septum midline, mucosa normal, no drainage or sinus tenderness  Throat: Lips, mucosa, and tongue normal; teeth and gums normal  Neck: Supple, symmetrical, trachea midline, no adenopathy, thyroid: not enlarged, symmetric, no tenderness/mass/nodules, no carotid bruit or JVD  Lungs:   Clear to auscultation bilaterally, respirations unlabored  Heart:  Regular rate and rhythm, S1, S2 normal, no murmur, rub or gallop  Abdomen:  Soft, non-tender, bowel sounds active all four quadrants,  no masses, no organomegaly  Extremities: Extremities normal, atraumatic, no cyanosis or edema  Pulses: 2+ and symmetric  Skin: Skin color, texture, turgor normal, no rashes or lesions  Neurologic: Normal        Pertinent  and/or Most Recent Results     LAB RESULTS:      Lab 08/26/24 0416 08/25/24  0114   WBC 9.38 11.83*   HEMOGLOBIN 12.4 13.6   HEMATOCRIT 39.8 42.1   PLATELETS 279 331   NEUTROS ABS  --  6.11   IMMATURE GRANS (ABS)  --  0.05   LYMPHS ABS  --  4.94*   MONOS ABS  --  0.59   EOS ABS  --  0.11   MCV 90.7 88.4         Lab 08/26/24 0416 08/25/24  0917 08/25/24  0114   SODIUM 138 137 137   POTASSIUM 4.1 3.8 3.8   CHLORIDE 103 99 96*   CO2 20.4* 25.9 25.6   ANION GAP 14.6 12.1 15.4*   BUN 35* 37* 40*   CREATININE 1.54* 1.71* 1.93*   EGFR 35.1* 30.9* 26.7*   GLUCOSE 101* 188* 162*   CALCIUM 9.4 9.6 10.4   MAGNESIUM  --   --  2.5*   PHOSPHORUS 4.1  --   --    HEMOGLOBIN A1C 9.12*  --   --          Lab 08/26/24 0416 08/25/24  0114   TOTAL PROTEIN  --  7.4   ALBUMIN 4.4 4.7   GLOBULIN  --  2.7   ALT (SGPT)  --  71*   AST (SGOT)  --  57*   BILIRUBIN  --  0.2   ALK PHOS  --  85         Lab 08/25/24  0258 08/25/24  0114   PROBNP  --  178.0   HSTROP T 25* 24*                 Brief Urine Lab Results  (Last result in the past 365 days)        Color   Clarity   Blood   Leuk Est   Nitrite   Protein    CREAT   Urine HCG        08/25/24 1107 Yellow   Clear   Trace   Moderate (2+)   Negative   Negative                 Microbiology Results (last 10 days)       ** No results found for the last 240 hours. **            US Renal Bilateral    Result Date: 8/25/2024  Impression: Impression: No hydronephrosis. Electronically Signed: Valdemar Ferrer  8/25/2024 4:42 PM EDT  Workstation ID: [id number]    XR Chest 1 View    Result Date: 8/25/2024  Impression: Impression: No acute cardiopulmonary process. Electronically Signed: Carly Meredith MD  8/25/2024 1:35 AM EDT  Workstation ID: [id number]             Results for orders placed during the hospital encounter of 12/01/23    Adult Transthoracic Echo Complete W/ Cont if Necessary Per Protocol (With Agitated Saline)    Interpretation Summary    Left ventricular ejection fraction appears to be 56 - 60%.    Saline test results are negative.    There is calcification of the aortic valve.      Labs Pending at Discharge:      Procedures Performed           Consults:   Consults       Date and Time Order Name Status Description    8/25/2024 11:07 AM Inpatient Nephrology Consult      8/25/2024  3:58 AM Hospitalist (on-call MD unless specified)                Discharge Details        Discharge Medications        Changes to Medications        Instructions Start Date   Eliquis 5 MG tablet tablet  Generic drug: apixaban  What changed: See the new instructions.   TAKE 1 TABLET EVERY 12 HOURS (FOR ATRIAL FIBRILLATION)             Continue These Medications        Instructions Start Date   B-D UF III MINI PEN NEEDLES 31G X 5 MM misc  Generic drug: Insulin Pen Needle   1 Piece, Does not apply, Daily      Pen Needles 32G X 4 MM misc   1 each, Does not apply, 4 Times Daily Before Meals & Nightly      cetirizine 10 MG tablet  Commonly known as: zyrTEC   10 mg, Oral, Daily      cyclobenzaprine 10 MG tablet  Commonly known as: FLEXERIL   10 mg, Oral, 3 Times Daily PRN      Insulin Lispro (1 Unit Dial)  100 UNIT/ML solution pen-injector  Commonly known as: HumaLOG KwikPen   10 Units, Subcutaneous, Daily With Breakfast, Lunch & Dinner      Lantus SoloStar 100 UNIT/ML injection pen  Generic drug: Insulin Glargine   30 Units, Subcutaneous, Nightly      levothyroxine 50 MCG tablet  Commonly known as: SYNTHROID, LEVOTHROID   50 mcg, Oral, Daily      lisinopril 2.5 MG tablet  Commonly known as: Zestril   2.5 mg, Oral, Daily      meclizine 25 MG tablet  Commonly known as: ANTIVERT   25 mg, Oral, 3 Times Daily PRN      montelukast 10 MG tablet  Commonly known as: SINGULAIR   10 mg, Nightly      nitroglycerin 0.4 MG SL tablet  Commonly known as: NITROSTAT   1 under the tongue as needed for angina, may repeat q5mins for up three doses      ondansetron ODT 8 MG disintegrating tablet  Commonly known as: ZOFRAN-ODT   PLACE 1 TABLET ON THE TONGUE EVERY 8 HOURS AS NEEDED FOR NAUSEA OR VOMITING      pantoprazole 40 MG EC tablet  Commonly known as: PROTONIX   40 mg, Oral, Daily      PARoxetine 20 MG tablet  Commonly known as: PAXIL   20 mg, Oral, Daily      potassium chloride 20 MEQ CR tablet  Commonly known as: KLOR-CON M20   20 mEq, Oral, Daily      ProAir  (90 Base) MCG/ACT inhaler  Generic drug: albuterol sulfate HFA   2 puffs, Inhalation, Every 4 Hours PRN      rosuvastatin 20 MG tablet  Commonly known as: CRESTOR   20 mg, Oral, Daily      Synjardy XR  MG tablet sustained-release 24 hour  Generic drug: Empagliflozin-metFORMIN HCl ER   1 tablet, Oral, Daily      topiramate 25 MG tablet  Commonly known as: TOPAMAX   25 mg, Oral, 2 Times Daily             Stop These Medications      aspirin 81 MG chewable tablet     carvedilol 6.25 MG tablet  Commonly known as: COREG     CENTRUM FRESH/FRUITY 50+ PO     estrogens (conjugated) 1.25 MG tablet  Commonly known as: PREMARIN     triamterene-hydrochlorothiazide 75-50 MG per tablet  Commonly known as: MAXZIDE     Turmeric 500 MG capsule              Allergies   Allergen  Reactions    Codeine GI Intolerance         Discharge Disposition:     Home or Self Care    Diet:  Hospital:  Diet Order   Procedures    Diet: Cardiac, Diabetic; Healthy Heart (2-3 Na+); Consistent Carbohydrate; Fluid Consistency: Thin (IDDSI 0)         Discharge Activity:         CODE STATUS:  Code Status and Medical Interventions: CPR (Attempt to Resuscitate); Full Support   Ordered at: 08/25/24 0447     Code Status (Patient has no pulse and is not breathing):    CPR (Attempt to Resuscitate)     Medical Interventions (Patient has pulse or is breathing):    Full Support         Future Appointments   Date Time Provider Department Center   8/27/2024 12:55 PM [facility]       Additional Instructions for the Follow-ups that You Need to Schedule       Discharge Follow-up with Specified Provider: Follow-up with nephrology Dr. Beal in 1 to 2 months; 2 Months   As directed      To: Follow-up with nephrology Dr. Beal in 1 to 2 months   Follow Up: 2 Months                Time spent on Discharge including face to face service:  >30 minutes    Signature: Electronically signed by Hao Christiansen MD, 08/26/24, 12:21 EDT.  [facility] Hospitalist Team

## 2024-08-27 ENCOUNTER — TRANSITIONAL CARE MANAGEMENT TELEPHONE ENCOUNTER (OUTPATIENT)
Dept: CALL CENTER | Facility: HOSPITAL | Age: 74
End: 2024-08-27
Payer: MEDICARE

## 2024-08-27 DIAGNOSIS — K21.9 GASTROESOPHAGEAL REFLUX DISEASE, UNSPECIFIED WHETHER ESOPHAGITIS PRESENT: ICD-10-CM

## 2024-08-27 NOTE — OUTREACH NOTE
Call Center TCM Note      Flowsheet Row Responses   [facility] patient discharged from? [facility]   Does the patient have one of the following disease processes/diagnoses(primary or secondary)? Other   TCM attempt successful? Yes   Call start time 0933   Call end time 0934   Discharge diagnosis SHAVON (acute kidney injury)   Person spoke with today (if not patient) and relationship Son   Meds reviewed with patient/caregiver? Yes   Is the patient having any side effects they believe may be caused by any medication additions or changes? No   Does the patient have all medications ordered at discharge? Yes   Is the patient taking all medications as directed (includes completed medication regime)? Yes   Comments 8/27/24   Does the patient have an appointment with their PCP within 7-14 days of discharge? Yes   Has home health visited the patient within 72 hours of discharge? N/A   Psychosocial issues? No   What is the patient's perception of their health status since discharge? Improving   TCM call completed? Yes   Call end time 0934            Sherry Adler RN    8/27/2024, 09:35 EDT

## 2024-08-28 RX ORDER — PANTOPRAZOLE SODIUM 40 MG/1
40 TABLET, DELAYED RELEASE ORAL DAILY
Qty: 90 TABLET | Refills: 3 | Status: SHIPPED | OUTPATIENT
Start: 2024-08-28

## 2024-08-28 NOTE — CASE MANAGEMENT/SOCIAL WORK
Case Management Discharge Note                Selected Continued Care - Discharged on 8/26/2024 Admission date: 8/25/2024 - Discharge disposition: Home or Self Care         Transportation Services  Private: Car    Final Discharge Disposition Code: 01 - home or self-care

## 2024-08-29 DIAGNOSIS — F32.5 MAJOR DEPRESSIVE DISORDER IN FULL REMISSION, UNSPECIFIED WHETHER RECURRENT: ICD-10-CM

## 2024-08-29 RX ORDER — PAROXETINE 20 MG/1
20 TABLET, FILM COATED ORAL DAILY
Qty: 90 TABLET | Refills: 3 | Status: SHIPPED | OUTPATIENT
Start: 2024-08-29

## 2024-09-09 DIAGNOSIS — E11.9 TYPE 2 DIABETES MELLITUS WITHOUT COMPLICATION, WITHOUT LONG-TERM CURRENT USE OF INSULIN: ICD-10-CM

## 2024-09-09 RX ORDER — INSULIN GLARGINE 100 [IU]/ML
30 INJECTION, SOLUTION SUBCUTANEOUS NIGHTLY
Qty: 10 ML | Refills: 3 | Status: SHIPPED | OUTPATIENT
Start: 2024-09-09

## 2024-09-10 ENCOUNTER — OFFICE VISIT (OUTPATIENT)
Dept: FAMILY MEDICINE CLINIC | Facility: CLINIC | Age: 74
End: 2024-09-10
Payer: MEDICARE

## 2024-09-10 ENCOUNTER — CLINICAL SUPPORT (OUTPATIENT)
Dept: FAMILY MEDICINE CLINIC | Facility: CLINIC | Age: 74
End: 2024-09-10
Payer: MEDICARE

## 2024-09-10 VITALS
HEART RATE: 67 BPM | OXYGEN SATURATION: 95 % | DIASTOLIC BLOOD PRESSURE: 65 MMHG | BODY MASS INDEX: 31.1 KG/M2 | TEMPERATURE: 96 F | SYSTOLIC BLOOD PRESSURE: 157 MMHG | WEIGHT: 169 LBS | HEIGHT: 62 IN

## 2024-09-10 DIAGNOSIS — E66.09 CLASS 1 OBESITY DUE TO EXCESS CALORIES WITH SERIOUS COMORBIDITY AND BODY MASS INDEX (BMI) OF 30.0 TO 30.9 IN ADULT: ICD-10-CM

## 2024-09-10 DIAGNOSIS — Z12.2 ENCOUNTER FOR SCREENING FOR LUNG CANCER: ICD-10-CM

## 2024-09-10 DIAGNOSIS — I10 PRIMARY HYPERTENSION: Chronic | ICD-10-CM

## 2024-09-10 DIAGNOSIS — E78.2 MIXED HYPERLIPIDEMIA: Chronic | ICD-10-CM

## 2024-09-10 DIAGNOSIS — F32.5 MAJOR DEPRESSIVE DISORDER IN FULL REMISSION, UNSPECIFIED WHETHER RECURRENT: Chronic | ICD-10-CM

## 2024-09-10 DIAGNOSIS — Z12.11 SCREENING FOR COLON CANCER: ICD-10-CM

## 2024-09-10 DIAGNOSIS — Z87.891 PERSONAL HISTORY OF NICOTINE DEPENDENCE: ICD-10-CM

## 2024-09-10 DIAGNOSIS — E11.40 TYPE 2 DIABETES MELLITUS WITH DIABETIC NEUROPATHY, WITH LONG-TERM CURRENT USE OF INSULIN: Chronic | ICD-10-CM

## 2024-09-10 DIAGNOSIS — Z79.4 TYPE 2 DIABETES MELLITUS WITH DIABETIC NEUROPATHY, WITH LONG-TERM CURRENT USE OF INSULIN: Chronic | ICD-10-CM

## 2024-09-10 DIAGNOSIS — N17.9 AKI (ACUTE KIDNEY INJURY): Primary | ICD-10-CM

## 2024-09-10 LAB
ALBUMIN SERPL-MCNC: 4.4 G/DL (ref 3.5–5.2)
ALBUMIN/GLOB SERPL: 1.7 G/DL
ALP SERPL-CCNC: 76 U/L (ref 39–117)
ALT SERPL W P-5'-P-CCNC: 29 U/L (ref 1–33)
ANION GAP SERPL CALCULATED.3IONS-SCNC: 11.8 MMOL/L (ref 5–15)
AST SERPL-CCNC: 24 U/L (ref 1–32)
BASOPHILS # BLD AUTO: 0.02 10*3/MM3 (ref 0–0.2)
BASOPHILS NFR BLD AUTO: 0.3 % (ref 0–1.5)
BILIRUB SERPL-MCNC: 0.4 MG/DL (ref 0–1.2)
BUN SERPL-MCNC: 18 MG/DL (ref 8–23)
BUN/CREAT SERPL: 13.4 (ref 7–25)
CALCIUM SPEC-SCNC: 10.1 MG/DL (ref 8.6–10.5)
CHLORIDE SERPL-SCNC: 106 MMOL/L (ref 98–107)
CHOLEST SERPL-MCNC: 144 MG/DL (ref 0–200)
CO2 SERPL-SCNC: 24.2 MMOL/L (ref 22–29)
CREAT SERPL-MCNC: 1.34 MG/DL (ref 0.57–1)
DEPRECATED RDW RBC AUTO: 47.8 FL (ref 37–54)
EGFRCR SERPLBLD CKD-EPI 2021: 41.4 ML/MIN/1.73
EOSINOPHIL # BLD AUTO: 0.11 10*3/MM3 (ref 0–0.4)
EOSINOPHIL NFR BLD AUTO: 1.4 % (ref 0.3–6.2)
ERYTHROCYTE [DISTWIDTH] IN BLOOD BY AUTOMATED COUNT: 14.8 % (ref 12.3–15.4)
GLOBULIN UR ELPH-MCNC: 2.6 GM/DL
GLUCOSE SERPL-MCNC: 104 MG/DL (ref 65–99)
HCT VFR BLD AUTO: 38.6 % (ref 34–46.6)
HDLC SERPL-MCNC: 50 MG/DL (ref 40–60)
HGB BLD-MCNC: 12.5 G/DL (ref 12–15.9)
IMM GRANULOCYTES # BLD AUTO: 0.02 10*3/MM3 (ref 0–0.05)
IMM GRANULOCYTES NFR BLD AUTO: 0.3 % (ref 0–0.5)
LDLC SERPL CALC-MCNC: 60 MG/DL (ref 0–100)
LDLC/HDLC SERPL: 1.03 {RATIO}
LYMPHOCYTES # BLD AUTO: 3.75 10*3/MM3 (ref 0.7–3.1)
LYMPHOCYTES NFR BLD AUTO: 47.8 % (ref 19.6–45.3)
MCH RBC QN AUTO: 28.9 PG (ref 26.6–33)
MCHC RBC AUTO-ENTMCNC: 32.4 G/DL (ref 31.5–35.7)
MCV RBC AUTO: 89.4 FL (ref 79–97)
MONOCYTES # BLD AUTO: 0.47 10*3/MM3 (ref 0.1–0.9)
MONOCYTES NFR BLD AUTO: 6 % (ref 5–12)
NEUTROPHILS NFR BLD AUTO: 3.47 10*3/MM3 (ref 1.7–7)
NEUTROPHILS NFR BLD AUTO: 44.2 % (ref 42.7–76)
NRBC BLD AUTO-RTO: 0 /100 WBC (ref 0–0.2)
PLATELET # BLD AUTO: 320 10*3/MM3 (ref 140–450)
PMV BLD AUTO: 10.5 FL (ref 6–12)
POTASSIUM SERPL-SCNC: 3.7 MMOL/L (ref 3.5–5.2)
PROT SERPL-MCNC: 7 G/DL (ref 6–8.5)
RBC # BLD AUTO: 4.32 10*6/MM3 (ref 3.77–5.28)
SODIUM SERPL-SCNC: 142 MMOL/L (ref 136–145)
TRIGL SERPL-MCNC: 212 MG/DL (ref 0–150)
VLDLC SERPL-MCNC: 34 MG/DL (ref 5–40)
WBC NRBC COR # BLD AUTO: 7.84 10*3/MM3 (ref 3.4–10.8)

## 2024-09-10 PROCEDURE — 1160F RVW MEDS BY RX/DR IN RCRD: CPT | Performed by: PREVENTIVE MEDICINE

## 2024-09-10 PROCEDURE — 1159F MED LIST DOCD IN RCRD: CPT | Performed by: PREVENTIVE MEDICINE

## 2024-09-10 PROCEDURE — 3046F HEMOGLOBIN A1C LEVEL >9.0%: CPT | Performed by: PREVENTIVE MEDICINE

## 2024-09-10 PROCEDURE — 99214 OFFICE O/P EST MOD 30 MIN: CPT | Performed by: PREVENTIVE MEDICINE

## 2024-09-10 PROCEDURE — G2211 COMPLEX E/M VISIT ADD ON: HCPCS | Performed by: PREVENTIVE MEDICINE

## 2024-09-10 PROCEDURE — 80053 COMPREHEN METABOLIC PANEL: CPT | Performed by: PREVENTIVE MEDICINE

## 2024-09-10 PROCEDURE — 3078F DIAST BP <80 MM HG: CPT | Performed by: PREVENTIVE MEDICINE

## 2024-09-10 PROCEDURE — 36415 COLL VENOUS BLD VENIPUNCTURE: CPT | Performed by: PREVENTIVE MEDICINE

## 2024-09-10 PROCEDURE — 80061 LIPID PANEL: CPT | Performed by: PREVENTIVE MEDICINE

## 2024-09-10 PROCEDURE — 1126F AMNT PAIN NOTED NONE PRSNT: CPT | Performed by: PREVENTIVE MEDICINE

## 2024-09-10 PROCEDURE — 3077F SYST BP >= 140 MM HG: CPT | Performed by: PREVENTIVE MEDICINE

## 2024-09-10 PROCEDURE — 85025 COMPLETE CBC W/AUTO DIFF WBC: CPT | Performed by: PREVENTIVE MEDICINE

## 2024-09-10 NOTE — PROGRESS NOTES
Subjective   Lula Burnett is a 73 y.o. female presents for   Chief Complaint   Patient presents with    Diabetes     Went to ER due to being out of primirine.   Glucose running between 93 and 105 in the mornings.    Hot Flashes       Health Maintenance Due   Topic Date Due    COLORECTAL CANCER SCREENING  Never done    Pneumococcal Vaccine 65+ (1 of 2 - PCV) Never done    ZOSTER VACCINE (1 of 2) Never done    RSV Vaccine - Adults (1 - 1-dose 60+ series) Never done    DIABETIC EYE EXAM  08/14/2024    COVID-19 Vaccine (1 - 2023-24 season) Never done    INFLUENZA VACCINE  08/01/2024       Diabetes       History of Present Illness  The patient is a 73-year-old female here today for follow-up on acute kidney injury, type 2 diabetes, primary hypertension, major depression, class 1 obesity due to excess calories, personal history of nicotine dependence, and screenings for colon and lung cancer.    She experienced hot flashes after discontinuing Premarin, which she had been using for over 40 years. She was advised to stop several medications including Maxide, aspirin, carvedilol, estrogen, and turmeric. She continues to take Eliquis and has been avoiding protein in her diet. Her urine output varies, sometimes being more than usual, but it is clear.    Regarding her diabetes, she has not required insulin since her hospital discharge. Her blood sugar levels have been between 99 and 100.    She quit smoking around the year 2000. She plans to undergo colon cancer screening once her bowel movements stabilize. She reports no throat or ear issues, coughing up blood or sputum, or wheezing. Her asthma is well-controlled. She experiences occasional heart flutters.    She has been advised against taking ibuprofen, Aleve, or Motrin. She finds relief from neuropathy in her feet by using Lao acupuncture soles. She has stopped getting the flu and pneumonia vaccines due to a previous adverse reaction.    FAMILY HISTORY  She has a  "family history of hypertension.    ALLERGIES  The patient has no known drug allergies.    Vitals:    09/10/24 1425 09/10/24 1426   BP: 168/76 157/65   BP Location: Left arm Right arm   Patient Position: Sitting Sitting   Cuff Size: Adult Adult   Pulse: 70 67   Temp: 96 °F (35.6 °C)    TempSrc: Temporal    SpO2: 95% 95%   Weight: 76.7 kg (169 lb)    Height: 157.5 cm (62.01\")      Body mass index is 30.9 kg/m².    Current Outpatient Medications on File Prior to Visit   Medication Sig Dispense Refill    cetirizine (zyrTEC) 10 MG tablet TAKE 1 TABLET DAILY 90 tablet 3    cyclobenzaprine (FLEXERIL) 10 MG tablet Take 1 tablet by mouth 3 (Three) Times a Day As Needed for Muscle Spasms. 90 tablet 1    Eliquis 5 MG tablet tablet TAKE 1 TABLET EVERY 12 HOURS (FOR ATRIAL FIBRILLATION) 180 tablet 3    Insulin Glargine (Lantus SoloStar) 100 UNIT/ML injection pen Inject 30 Units under the skin into the appropriate area as directed Every Night. 10 mL 3    Insulin Lispro, 1 Unit Dial, (HumaLOG KwikPen) 100 UNIT/ML solution pen-injector Inject 10 Units under the skin into the appropriate area as directed Daily With Breakfast, Lunch & Dinner. 15 mL 3    Insulin Pen Needle (B-D UF III MINI PEN NEEDLES) 31G X 5 MM misc 1 Piece Daily. 100 each 3    Insulin Pen Needle (Pen Needles) 32G X 4 MM misc Use 1 each 4 (Four) Times a Day Before Meals & at Bedtime. 600 each 2    levothyroxine (SYNTHROID, LEVOTHROID) 50 MCG tablet Take 1 tablet by mouth Daily. 90 tablet 0    lisinopril (Zestril) 2.5 MG tablet Take 1 tablet by mouth Daily. 90 tablet 0    meclizine (ANTIVERT) 25 MG tablet TAKE 1 TABLET THREE TIMES A DAY AS NEEDED FOR DIZZINESS 90 tablet 0    montelukast (SINGULAIR) 10 MG tablet TAKE 1 TABLET EVERY NIGHT 90 tablet 3    nitroglycerin (NITROSTAT) 0.4 MG SL tablet 1 under the tongue as needed for angina, may repeat q5mins for up three doses 25 tablet 1    ondansetron ODT (ZOFRAN-ODT) 8 MG disintegrating tablet PLACE 1 TABLET ON THE " TONGUE EVERY 8 HOURS AS NEEDED FOR NAUSEA OR VOMITING 30 tablet 0    pantoprazole (PROTONIX) 40 MG EC tablet TAKE 1 TABLET DAILY 90 tablet 3    PARoxetine (PAXIL) 20 MG tablet TAKE 1 TABLET DAILY 90 tablet 3    potassium chloride (KLOR-CON M20) 20 MEQ CR tablet TAKE 1 TABLET DAILY 21 tablet 16    ProAir  (90 Base) MCG/ACT inhaler Inhale 2 puffs Every 4 (Four) Hours As Needed for Wheezing or Shortness of Air. 18 g 3    rosuvastatin (CRESTOR) 20 MG tablet Take 1 tablet by mouth Daily. 90 tablet 3    Synjardy XR  MG tablet sustained-release 24 hour Take 1 tablet by mouth Daily. 90 tablet 0    topiramate (TOPAMAX) 25 MG tablet TAKE 1 TABLET TWICE A  tablet 0     No current facility-administered medications on file prior to visit.       The following portions of the patient's history were reviewed and updated as appropriate: allergies, current medications, past family history, past medical history, past social history, past surgical history, and problem list.    Review of Systems   Psychiatric/Behavioral:  Negative for depressed mood.        Objective   Physical Exam  Vitals reviewed.   Constitutional:       General: She is not in acute distress.     Appearance: She is well-developed. She is obese. She is not ill-appearing or toxic-appearing.   HENT:      Head: Normocephalic and atraumatic.      Right Ear: Tympanic membrane, ear canal and external ear normal.      Left Ear: Tympanic membrane, ear canal and external ear normal.      Nose: Nose normal.      Mouth/Throat:      Mouth: Mucous membranes are moist.      Pharynx: No posterior oropharyngeal erythema.   Eyes:      Extraocular Movements: Extraocular movements intact.      Conjunctiva/sclera: Conjunctivae normal.      Pupils: Pupils are equal, round, and reactive to light.   Neck:      Vascular: No carotid bruit.   Cardiovascular:      Rate and Rhythm: Normal rate and regular rhythm.      Heart sounds: Normal heart sounds.   Pulmonary:       Effort: Pulmonary effort is normal.      Breath sounds: Normal breath sounds.   Abdominal:      General: Bowel sounds are normal. There is no distension.      Palpations: Abdomen is soft. There is no mass.      Tenderness: There is no abdominal tenderness.   Musculoskeletal:      Cervical back: Neck supple. No tenderness.   Lymphadenopathy:      Cervical: No cervical adenopathy.   Skin:     General: Skin is warm.   Neurological:      General: No focal deficit present.      Mental Status: She is alert and oriented to person, place, and time.   Psychiatric:         Mood and Affect: Mood normal.         Behavior: Behavior normal.       Physical Exam  Vital Signs  Blood pressure readings are 168/76 and 157/65.    PHQ-9 Total Score:    Results           Assessment & Plan   Diagnoses and all orders for this visit:    1. SHAVON (acute kidney injury) (Primary)  -     Urinalysis With Culture If Indicated - Urine, Clean Catch    2. Screening for colon cancer    3. Encounter for screening for lung cancer  -     Cancel:  CT Chest Low Dose Cancer Screening WO    4. Type 2 diabetes mellitus with diabetic neuropathy, with long-term current use of insulin    5. Primary hypertension  -     CBC Auto Differential  -     Cancel: Comprehensive Metabolic Panel    6. Major depressive disorder in full remission, unspecified whether recurrent    7. Class 1 obesity due to excess calories with serious comorbidity and body mass index (BMI) of 30.0 to 30.9 in adult    8. Personal history of nicotine dependence  -     Cancel:  CT Chest Low Dose Cancer Screening WO      Assessment & Plan  1. Acute Kidney Injury.  Eliquis is not typically associated with significant renal failure, but it can result in hematuria. She reports dark urine and decreased urine output after starting Eliquis. She was hospitalized for acute kidney injury and was advised to stop Maxide, aspirin, carvedilol, estrogen, and turmeric. She will arrange an appointment with Dr. Beal  the nephrologist, within the next month or two. A complete blood count (CBC), comprehensive metabolic panel (CMP), and urinalysis will be conducted today. She is advised to monitor her blood pressure at home and provide 10 readings over the next two weeks. These readings can be submitted via mail, MyChart, or phone call.    2. Type 2 Diabetes.  Her blood sugar levels are currently well-controlled, ranging between 99 and 100. She has not needed to take insulin since her hospital discharge. She should continue monitoring her blood sugar levels regularly.    3. Primary Hypertension.  Her blood pressure was elevated today at 168/76 and 157/65. She is advised to monitor her blood pressure at home and provide 10 readings over the next two weeks. These readings can be submitted via mail, MyChart, or phone call.    4. Major Depression.  She reports that her mood and depression are currently stable. No changes to her current treatment plan are necessary at this time.    5. Class I Obesity.  She is advised to continue following a balanced diet and engage in regular physical activity to manage her weight.    6. Personal History of Nicotine Dependence.  She quit smoking over 20 years ago. No lung cancer screening is necessary at this time.    7. Health Maintenance.  She is due for a colon cancer screening. She plans to schedule this once her bowel movements stabilize.    Follow-up  The patient will follow up in 3 months.    Patient Instructions     Health Maintenance Due   Topic Date Due    COLORECTAL CANCER SCREENING  Never done    Pneumococcal Vaccine 65+ (1 of 2 - PCV) Never done    ZOSTER VACCINE (1 of 2) Never done    RSV Vaccine - Adults (1 - 1-dose 60+ series) Never done    LUNG CANCER SCREENING  10/15/2022    DIABETIC EYE EXAM  08/14/2024    COVID-19 Vaccine (1 - 2023-24 season) Never done    INFLUENZA VACCINE  08/01/2024    Patient to set up appointment with Dr. Beal    Check blood pressure cuff for accuracy and send 10  blood pressures over 2 weeks.  Watch sodium, alcohol and weight        Patient or patient representative verbalized consent for the use of Ambient Listening during the visit with  Yumiko Shah MD for chart documentation. 9/10/2024  16:45 EDT

## 2024-09-10 NOTE — PATIENT INSTRUCTIONS
Health Maintenance Due   Topic Date Due    COLORECTAL CANCER SCREENING  Never done    Pneumococcal Vaccine 65+ (1 of 2 - PCV) Never done    ZOSTER VACCINE (1 of 2) Never done    RSV Vaccine - Adults (1 - 1-dose 60+ series) Never done    LUNG CANCER SCREENING  10/15/2022    DIABETIC EYE EXAM  08/14/2024    COVID-19 Vaccine (1 - 2023-24 season) Never done    INFLUENZA VACCINE  08/01/2024    Patient to set up appointment with Dr. Beal    Check blood pressure cuff for accuracy and send 10 blood pressures over 2 weeks.  Watch sodium, alcohol and weight

## 2024-09-11 PROCEDURE — 87086 URINE CULTURE/COLONY COUNT: CPT | Performed by: PREVENTIVE MEDICINE

## 2024-09-11 PROCEDURE — 81001 URINALYSIS AUTO W/SCOPE: CPT | Performed by: PREVENTIVE MEDICINE

## 2024-09-12 ENCOUNTER — TELEPHONE (OUTPATIENT)
Dept: FAMILY MEDICINE CLINIC | Facility: CLINIC | Age: 74
End: 2024-09-12
Payer: MEDICARE

## 2024-09-12 LAB
BACTERIA UR QL AUTO: ABNORMAL /HPF
BILIRUB UR QL STRIP: NEGATIVE
CLARITY UR: ABNORMAL
COLOR UR: ABNORMAL
GLUCOSE UR STRIP-MCNC: ABNORMAL MG/DL
HGB UR QL STRIP.AUTO: ABNORMAL
HOLD SPECIMEN: NORMAL
HYALINE CASTS UR QL AUTO: ABNORMAL /LPF
KETONES UR QL STRIP: ABNORMAL
LEUKOCYTE ESTERASE UR QL STRIP.AUTO: ABNORMAL
NITRITE UR QL STRIP: NEGATIVE
PH UR STRIP.AUTO: 7.5 [PH] (ref 5–8)
PROT UR QL STRIP: ABNORMAL
RBC # UR STRIP: ABNORMAL /HPF
REF LAB TEST METHOD: ABNORMAL
SP GR UR STRIP: 1.03 (ref 1–1.03)
SQUAMOUS #/AREA URNS HPF: ABNORMAL /HPF
UROBILINOGEN UR QL STRIP: ABNORMAL
WBC # UR STRIP: ABNORMAL /HPF

## 2024-09-12 NOTE — TELEPHONE ENCOUNTER
Please call patient and have her come back in for careful clean-catch midstream urine for culture.  Also ask her when her appointment is scheduled with the nephrologist.  Her kidney function is still decreased so avoid ibuprofen Aleve Motrin and limit x-ray dyes.  Glucose was well-controlled at 104 and her lipids were all in an acceptable range.  Keep our follow-up as planned

## 2024-09-12 NOTE — TELEPHONE ENCOUNTER
RELAY----- Message from Yumiko Shah sent at 9/12/2024 11:58 AM EDT -----  Please call lab and see why they did not culture uriner.  If it was not cultured-call patient and have her return for culture  ----- Message -----  From: Lab, Background User  Sent: 9/10/2024   7:46 PM EDT  To: Yumiko Shah MD

## 2024-09-13 LAB — BACTERIA SPEC AEROBE CULT: NORMAL

## 2024-09-13 NOTE — PROGRESS NOTES
Urine analysis did not show signs of infection in the culture.  However you were also spilling a small amount of blood and moderate leukocytes in the specimen that we tested.  If you are still having any symptoms we should have you do a careful repeat clean-catch midstream or if you are having vaginal discharge you should get checked by the gynecologist please let us know about the above call if any other questions or concerns

## 2024-09-16 ENCOUNTER — TELEPHONE (OUTPATIENT)
Dept: FAMILY MEDICINE CLINIC | Facility: CLINIC | Age: 74
End: 2024-09-16
Payer: MEDICARE

## 2024-10-16 DIAGNOSIS — E03.9 ACQUIRED HYPOTHYROIDISM: ICD-10-CM

## 2024-10-16 DIAGNOSIS — G43.809 OTHER MIGRAINE WITHOUT STATUS MIGRAINOSUS, NOT INTRACTABLE: ICD-10-CM

## 2024-10-16 RX ORDER — LISINOPRIL 2.5 MG/1
2.5 TABLET ORAL DAILY
Qty: 90 TABLET | Refills: 3 | Status: SHIPPED | OUTPATIENT
Start: 2024-10-16

## 2024-10-16 RX ORDER — LEVOTHYROXINE SODIUM 50 UG/1
50 TABLET ORAL DAILY
Qty: 90 TABLET | Refills: 3 | Status: SHIPPED | OUTPATIENT
Start: 2024-10-16

## 2024-10-16 RX ORDER — TOPIRAMATE 25 MG/1
25 TABLET, FILM COATED ORAL 2 TIMES DAILY
Qty: 180 TABLET | Refills: 3 | Status: SHIPPED | OUTPATIENT
Start: 2024-10-16

## 2024-11-01 DIAGNOSIS — E11.9 TYPE 2 DIABETES MELLITUS WITHOUT COMPLICATION, WITHOUT LONG-TERM CURRENT USE OF INSULIN: ICD-10-CM

## 2024-11-01 RX ORDER — INSULIN GLARGINE 100 [IU]/ML
30 INJECTION, SOLUTION SUBCUTANEOUS NIGHTLY
Qty: 10 ML | Refills: 0 | Status: SHIPPED | OUTPATIENT
Start: 2024-11-01

## 2024-11-02 DIAGNOSIS — I10 PRIMARY HYPERTENSION: ICD-10-CM

## 2024-11-02 RX ORDER — CLOPIDOGREL BISULFATE 75 MG/1
TABLET ORAL
Qty: 90 TABLET | Refills: 3 | OUTPATIENT
Start: 2024-11-02

## 2024-11-06 DIAGNOSIS — E11.9 TYPE 2 DIABETES MELLITUS WITHOUT COMPLICATION, WITHOUT LONG-TERM CURRENT USE OF INSULIN: ICD-10-CM

## 2024-11-06 RX ORDER — INSULIN GLARGINE 100 [IU]/ML
30 INJECTION, SOLUTION SUBCUTANEOUS NIGHTLY
Qty: 30 ML | Refills: 0 | Status: SHIPPED | OUTPATIENT
Start: 2024-11-06

## 2024-11-06 NOTE — TELEPHONE ENCOUNTER
Caller: [facility] - [place], [place] - [address] [phone] [facility] [phone]     Relationship: Pharmacy    Best call back number: [phone]    Requested Prescriptions:   Requested Prescriptions     Pending Prescriptions Disp Refills    Insulin Glargine (Lantus SoloStar) 100 UNIT/ML injection pen 10 mL 0     Sig: Inject 30 Units under the skin into the appropriate area as directed Every Night.        Pharmacy where request should be sent:      Last office visit with prescribing clinician: 9/10/2024   Last telemedicine visit with prescribing clinician: Visit date not found   Next office visit with prescribing clinician: 12/10/2024     Additional details provided by patient:     Does the patient have less than a 3 day supply:  [x] Yes  [] No    Would you like a call back once the refill request has been completed: [] Yes [] No    If the office needs to give you a call back, can they leave a voicemail: [] Yes [] No    Gt [employer] Rep   11/06/24 15:58 EST

## 2024-12-06 ENCOUNTER — TELEPHONE (OUTPATIENT)
Dept: FAMILY MEDICINE CLINIC | Facility: CLINIC | Age: 74
End: 2024-12-06
Payer: MEDICARE

## 2024-12-06 NOTE — TELEPHONE ENCOUNTER
Caller: [facility]    Relationship: Other    Best call back number: [phone]     What is the best time to reach you: ANY    Who are you requesting to speak with (clinical staff, provider,  specific staff member): PCP    Do you know the name of the person who called:     What was the call regarding: MEDICAL SUPPLY [employer] NEEDS OFFICE NOTES EVERY 6 MONTHS IN ORDER TO PROVIDE PATIENT WITH FREESTYLE LILI 3 SUPPLIES. THEY WILL SEND OVER A FAX REQUEST FOR OFFICE NOTES.    Is it okay if the provider responds through MyChart:

## 2024-12-09 PROBLEM — N18.32 STAGE 3B CHRONIC KIDNEY DISEASE: Status: ACTIVE | Noted: 2024-11-01

## 2024-12-09 PROBLEM — R80.1 PERSISTENT PROTEINURIA: Status: ACTIVE | Noted: 2024-11-01

## 2024-12-09 NOTE — PATIENT INSTRUCTIONS
Health Maintenance Due   Topic Date Due    Pneumococcal Vaccine 65+ (1 of 2 - PCV) Never done    ZOSTER VACCINE (1 of 2) Never done    RSV Vaccine - Adults (1 - 1-dose 75+ series) Never done    INFLUENZA VACCINE  07/01/2024    DIABETIC EYE EXAM  08/14/2024    COVID-19 Vaccine (1 - 2024-25 season) Never done    ANNUAL WELLNESS VISIT  02/13/2025    HEMOGLOBIN A1C  02/26/2025    You are due for Shingrix vaccination series ( the newest shingles vaccine).  It is a two shot series spaced 2-6 months apart. Please get this vaccine series started at your earliest convenience at your local pharmacy to help avoid shingles outbreak. It is more effective than the old Zostavax vaccine and is recommended even if you have had the Zostavax vaccine in the past.  Once the Shingrix series is completed, it does not need to be repeated.   For more information, please look at the website below:  https://www.cdc.gov/vaccines/vpd/shingles/public/shingrix/index.html    Check blood pressure cuff for accuracy and send 10 blood pressures over 2 weeks.  Watch sodium, alcohol and weight.  12 hour fast for labs.

## 2024-12-10 ENCOUNTER — OFFICE VISIT (OUTPATIENT)
Dept: FAMILY MEDICINE CLINIC | Facility: CLINIC | Age: 74
End: 2024-12-10
Payer: MEDICARE

## 2024-12-10 VITALS
SYSTOLIC BLOOD PRESSURE: 121 MMHG | TEMPERATURE: 96.9 F | WEIGHT: 161.2 LBS | BODY MASS INDEX: 29.66 KG/M2 | DIASTOLIC BLOOD PRESSURE: 76 MMHG | OXYGEN SATURATION: 98 % | HEIGHT: 62 IN | HEART RATE: 81 BPM

## 2024-12-10 DIAGNOSIS — Z23 NEED FOR VACCINATION: ICD-10-CM

## 2024-12-10 DIAGNOSIS — E66.3 OVERWEIGHT (BMI 25.0-29.9): ICD-10-CM

## 2024-12-10 DIAGNOSIS — Z79.4 TYPE 2 DIABETES MELLITUS WITH DIABETIC NEUROPATHY, WITH LONG-TERM CURRENT USE OF INSULIN: Primary | Chronic | ICD-10-CM

## 2024-12-10 DIAGNOSIS — E78.2 MIXED HYPERLIPIDEMIA: Chronic | ICD-10-CM

## 2024-12-10 DIAGNOSIS — I48.0 PAROXYSMAL ATRIAL FIBRILLATION: Chronic | ICD-10-CM

## 2024-12-10 DIAGNOSIS — Z23 NEED FOR COVID-19 VACCINE: ICD-10-CM

## 2024-12-10 DIAGNOSIS — F32.5 MAJOR DEPRESSIVE DISORDER IN FULL REMISSION, UNSPECIFIED WHETHER RECURRENT: Chronic | ICD-10-CM

## 2024-12-10 DIAGNOSIS — I63.9 LEFT PONTINE STROKE: ICD-10-CM

## 2024-12-10 DIAGNOSIS — E03.9 HYPOTHYROIDISM, UNSPECIFIED TYPE: Chronic | ICD-10-CM

## 2024-12-10 DIAGNOSIS — E11.40 TYPE 2 DIABETES MELLITUS WITH DIABETIC NEUROPATHY, WITH LONG-TERM CURRENT USE OF INSULIN: Primary | Chronic | ICD-10-CM

## 2024-12-10 DIAGNOSIS — R25.1 TREMOR: ICD-10-CM

## 2024-12-10 DIAGNOSIS — I10 PRIMARY HYPERTENSION: Chronic | ICD-10-CM

## 2024-12-10 DIAGNOSIS — I05.0 MITRAL VALVE STENOSIS, UNSPECIFIED ETIOLOGY: Chronic | ICD-10-CM

## 2024-12-10 DIAGNOSIS — K21.9 GASTROESOPHAGEAL REFLUX DISEASE, UNSPECIFIED WHETHER ESOPHAGITIS PRESENT: Chronic | ICD-10-CM

## 2024-12-10 PROBLEM — M25.361 INSTABILITY OF RIGHT KNEE JOINT: Status: ACTIVE | Noted: 2024-12-10

## 2024-12-10 PROCEDURE — 3078F DIAST BP <80 MM HG: CPT | Performed by: PREVENTIVE MEDICINE

## 2024-12-10 PROCEDURE — 99214 OFFICE O/P EST MOD 30 MIN: CPT | Performed by: PREVENTIVE MEDICINE

## 2024-12-10 PROCEDURE — 1126F AMNT PAIN NOTED NONE PRSNT: CPT | Performed by: PREVENTIVE MEDICINE

## 2024-12-10 PROCEDURE — G2211 COMPLEX E/M VISIT ADD ON: HCPCS | Performed by: PREVENTIVE MEDICINE

## 2024-12-10 PROCEDURE — 1160F RVW MEDS BY RX/DR IN RCRD: CPT | Performed by: PREVENTIVE MEDICINE

## 2024-12-10 PROCEDURE — 3074F SYST BP LT 130 MM HG: CPT | Performed by: PREVENTIVE MEDICINE

## 2024-12-10 PROCEDURE — 1159F MED LIST DOCD IN RCRD: CPT | Performed by: PREVENTIVE MEDICINE

## 2024-12-10 NOTE — PROGRESS NOTES
Subjective   Lula Burnett is a 74 y.o. female presents for   Chief Complaint   Patient presents with    Primary Care Follow-Up     3 month follow up. Leg shaking from ankle to hip mainly in knee. Feels like its giving out.       Health Maintenance Due   Topic Date Due    Pneumococcal Vaccine 65+ (1 of 2 - PCV) Never done    ZOSTER VACCINE (1 of 2) Never done    RSV Vaccine - Adults (1 - 1-dose 75+ series) Never done    INFLUENZA VACCINE  07/01/2024    DIABETIC EYE EXAM  08/14/2024    COVID-19 Vaccine (1 - 2024-25 season) Never done    ANNUAL WELLNESS VISIT  02/13/2025    HEMOGLOBIN A1C  02/26/2025       Primary Care Follow-UpAssociated symptoms include: depressed mood and tremors. Pertinent negatives include no chest pain and no palpitations.      History of Present Illness  The patient is a 74-year-old female who is here today to follow up on type 2 diabetes with diabetic neuropathy, hypertension, paroxysmal atrial fibrillation, mixed hyperlipidemia, mitral valve stenosis, left pontine stroke, hypothyroidism, major depression, overweight with a body mass index of 29, GERD, instability of the right knee joint, and need for vaccination.    She has not considered receiving the influenza or COVID-19 vaccines today. She reports no exposure to large groups, except when at home with her dog. She infrequently ventures out for grocery shopping.    Her blood glucose levels have remained within the range of 100 to 200, with no readings below 100. She has only required insulin administration once this month. She experiences occasional blurred vision, which she attributes to her diabetes. She is due for an ophthalmology appointment, which she was unable to attend last week due to her efforts to secure assisted living arrangements.    She owns a blood pressure monitor at home and has been recording her blood pressure readings, although she forgot to bring the logbook today.    She reports experiencing tremors in her right  leg, which she describes as a sensation akin to a bubble in her foot that ascends to her knee, resulting in loss of control over her leg. This symptom has been persistent for the past 2 weeks. She has been taking potassium supplements, suspecting the cause to be muscle spasms, but the symptoms do not align with this diagnosis. She also reports shaking of the leg, followed by lateral movement of the knee. She reports no history of seizures, headaches, or changes in vision. She reports no falls or injuries to her head or leg. She recalls an incident where she lost control of her leg while feeding her dog, necessitating her to grab onto her dog's cage for support. She has a family history of Parkinson's disease in her uncle and aunt. The tremors are also present during ambulation.    She has noticed heart palpitations on one occasion. She is currently not under the care of a cardiologist. She has not experienced any further strokes since the diagnosis of atrial fibrillation and initiation of anticoagulant therapy.    She reports no increase in dry skin or hair loss, attributing any changes to seasonal variations.    Her depression is well-managed, and she reports no homicidal or suicidal ideations. She takes Paxil on a weekly basis.    She has not experienced heartburn recently and continues to take pantoprazole.    She reports a decrease in bowel movements compared to her usual pattern. She has not tried MiraLAX for this issue.    Supplemental Information  She has not had a dental visit since 2021. She performs monthly self-breast examinations in both supine and standing positions. She reports no changes in hearing or vision since her last visit. She reports no coughing up blood, sputum, or wheezing.    FAMILY HISTORY  The patient reports that her uncle and aunt had Parkinson's disease.    MEDICATIONS  Current: insulin, potassium pills, Paxil, pantoprazole    Vitals:    12/10/24 1309 12/10/24 1313   BP: 152/81 121/76  "  BP Location: Right arm Left arm   Patient Position: Sitting Sitting   Cuff Size: Adult Adult   Pulse: 84 81   Temp: 96.9 °F (36.1 °C)    TempSrc: Tympanic    SpO2: 98% 98%   Weight: 73.1 kg (161 lb 3.2 oz)    Height: 157.5 cm (62.01\")      Body mass index is 29.48 kg/m².    Current Outpatient Medications on File Prior to Visit   Medication Sig Dispense Refill    cetirizine (zyrTEC) 10 MG tablet TAKE 1 TABLET DAILY 90 tablet 3    cyclobenzaprine (FLEXERIL) 10 MG tablet Take 1 tablet by mouth 3 (Three) Times a Day As Needed for Muscle Spasms. 90 tablet 1    Eliquis 5 MG tablet tablet TAKE 1 TABLET EVERY 12 HOURS (FOR ATRIAL FIBRILLATION) 180 tablet 3    Insulin Glargine (Lantus SoloStar) 100 UNIT/ML injection pen Inject 30 Units under the skin into the appropriate area as directed Every Night. (Patient taking differently: Inject 30 Units under the skin into the appropriate area as directed 2 (Two) Times a Day As Needed (Watching sugar using as needed).) 30 mL 0    Insulin Lispro, 1 Unit Dial, (HumaLOG KwikPen) 100 UNIT/ML solution pen-injector Inject 10 Units under the skin into the appropriate area as directed Daily With Breakfast, Lunch & Dinner. (Patient taking differently: Inject 10 Units under the skin into the appropriate area as directed 3 (Three) Times a Day As Needed.) 15 mL 3    Insulin Pen Needle (B-D UF III MINI PEN NEEDLES) 31G X 5 MM misc 1 Piece Daily. 100 each 3    Insulin Pen Needle (Pen Needles) 32G X 4 MM misc Use 1 each 4 (Four) Times a Day Before Meals & at Bedtime. 600 each 2    levothyroxine (SYNTHROID, LEVOTHROID) 50 MCG tablet TAKE 1 TABLET DAILY 90 tablet 3    lisinopril (PRINIVIL,ZESTRIL) 2.5 MG tablet TAKE 1 TABLET DAILY 90 tablet 3    meclizine (ANTIVERT) 25 MG tablet TAKE 1 TABLET THREE TIMES A DAY AS NEEDED FOR DIZZINESS 90 tablet 0    montelukast (SINGULAIR) 10 MG tablet TAKE 1 TABLET EVERY NIGHT 90 tablet 3    nitroglycerin (NITROSTAT) 0.4 MG SL tablet 1 under the tongue as needed " for angina, may repeat q5mins for up three doses 25 tablet 1    ondansetron ODT (ZOFRAN-ODT) 8 MG disintegrating tablet PLACE 1 TABLET ON THE TONGUE EVERY 8 HOURS AS NEEDED FOR NAUSEA OR VOMITING 30 tablet 0    pantoprazole (PROTONIX) 40 MG EC tablet TAKE 1 TABLET DAILY 90 tablet 3    PARoxetine (PAXIL) 20 MG tablet TAKE 1 TABLET DAILY 90 tablet 3    potassium chloride (KLOR-CON M20) 20 MEQ CR tablet TAKE 1 TABLET DAILY 21 tablet 16    ProAir  (90 Base) MCG/ACT inhaler Inhale 2 puffs Every 4 (Four) Hours As Needed for Wheezing or Shortness of Air. 18 g 3    rosuvastatin (CRESTOR) 20 MG tablet Take 1 tablet by mouth Daily. 90 tablet 3    Synjardy XR  MG tablet sustained-release 24 hour Take 1 tablet by mouth Daily. 90 tablet 0    topiramate (TOPAMAX) 25 MG tablet TAKE 1 TABLET TWICE A  tablet 3     No current facility-administered medications on file prior to visit.       The following portions of the patient's history were reviewed and updated as appropriate: allergies, current medications, past family history, past medical history, past social history, past surgical history, and problem list.    Review of Systems   Cardiovascular:  Negative for chest pain, palpitations and leg swelling.   Gastrointestinal:  Negative for GERD.   Neurological:  Positive for tremors.   Psychiatric/Behavioral:  Positive for depressed mood.        Objective   Physical Exam  Vitals reviewed.   Constitutional:       General: She is not in acute distress.     Appearance: Normal appearance. She is well-developed. She is not ill-appearing or toxic-appearing.   HENT:      Head: Normocephalic and atraumatic.      Right Ear: Tympanic membrane, ear canal and external ear normal.      Left Ear: Tympanic membrane, ear canal and external ear normal.      Nose: Nose normal.      Mouth/Throat:      Mouth: Mucous membranes are moist.      Pharynx: No posterior oropharyngeal erythema.   Eyes:      Extraocular Movements: Extraocular  movements intact.      Conjunctiva/sclera: Conjunctivae normal.      Pupils: Pupils are equal, round, and reactive to light.   Neck:      Vascular: No carotid bruit.   Cardiovascular:      Rate and Rhythm: Normal rate and regular rhythm.      Pulses:           Dorsalis pedis pulses are 1+ on the right side and 1+ on the left side.        Posterior tibial pulses are 1+ on the right side and 1+ on the left side.      Heart sounds: Normal heart sounds.   Pulmonary:      Effort: Pulmonary effort is normal.      Breath sounds: Normal breath sounds.   Abdominal:      General: Bowel sounds are normal. There is no distension.      Palpations: Abdomen is soft. There is no mass.      Tenderness: There is no abdominal tenderness. There is no right CVA tenderness or left CVA tenderness.   Musculoskeletal:         General: Normal range of motion.      Cervical back: Neck supple. No tenderness.      Right lower leg: No edema.      Left lower leg: No edema.   Feet:      Right foot:      Protective Sensation: 10 sites tested.  10 sites sensed.      Skin integrity: Skin integrity normal.      Toenail Condition: Right toenails are normal.      Left foot:      Protective Sensation: 10 sites tested.  10 sites sensed.      Skin integrity: Skin integrity normal.      Toenail Condition: Left toenails are normal.      Comments: Diabetic Foot Exam Performed and Monofilament Test Performed    Lymphadenopathy:      Cervical: No cervical adenopathy.   Skin:     General: Skin is warm.   Neurological:      General: No focal deficit present.      Mental Status: She is alert and oriented to person, place, and time.   Psychiatric:         Mood and Affect: Mood normal.         Behavior: Behavior normal.       Physical Exam  The right ear has a little wax, but the eardrum is visible. Throat appears normal.  Carotid arteries sound normal.  Lungs were auscultated.  Heart rate and rhythm are normal.    PHQ-9 Total Score:    Results           Assessment  & Plan   Diagnoses and all orders for this visit:    1. Type 2 diabetes mellitus with diabetic neuropathy, with long-term current use of insulin (Primary)  -     Ambulatory Referral for Diabetic Eye Exam-Ophthalmology  -     ORDER: Hemoglobin A1c; Future  -     Comprehensive Metabolic Panel; Future  -     Microalbumin / Creatinine Urine Ratio - Urine, Clean Catch; Future    2. Need for vaccination    3. Need for COVID-19 vaccine    4. Primary hypertension  -     CBC Auto Differential; Future    5. Paroxysmal atrial fibrillation  -     Ambulatory Referral to Cardiology    6. Mixed hyperlipidemia  -     Lipid Panel; Future    7. Mitral valve stenosis, unspecified etiology  -     Ambulatory Referral to Cardiology    8. Left pontine stroke    9. Hypothyroidism, unspecified type  -     TSH Rfx On Abnormal To Free T4; Future    10. Major depressive disorder in full remission, unspecified whether recurrent  -     Vitamin B12; Future    11. Overweight (BMI 25.0-29.9)    12. Gastroesophageal reflux disease, unspecified whether esophagitis present  -     Magnesium; Future    13. Tremor  -     CT Head Without Contrast; Future  -     Ambulatory Referral to Neurology      Assessment & Plan  1. Type 2 diabetes with diabetic neuropathy.  Her blood sugar levels have been stable, with no readings below 100 or above 200. She has only used her insulin once this month. She is advised to continue her current regimen and monitor her blood sugar levels regularly. An eye exam is due for her diabetes management.    2. Hypertension.  Her blood pressure was slightly elevated during today's visit. She is instructed to monitor her blood pressure at home, taking 10 readings over the next 2 weeks, and report the results.    3. Right leg tremor.  She reports a tremor in her right leg, starting from the ball of her foot and moving up to her knee, causing a loss of control. A CT scan of the head will be ordered to investigate the cause. A referral  to a neurologist will be made for further evaluation.    4. Paroxysmal atrial fibrillation.  She has not experienced any new episodes of atrial fibrillation since starting her blood thinner. A referral to a cardiologist, Dr. Fletcher, will be made for further management of her condition.    5. Hypothyroidism.  She reports no new symptoms such as increased dry skin or hair loss. She is advised to continue her current thyroid medication regimen.    6. Major depression.  Her depression is currently stable. She does not feel homicidal or suicidal. She is advised to continue taking Paxil as prescribed and to monitor her symptoms, especially during the winter months.    7. Gastroesophageal reflux disease (GERD).  She has not experienced heartburn for a while. She is advised to continue taking pantoprazole as needed.    8. Constipation.  She is advised to start taking MiraLAX daily, adjusting the dosage as needed to manage her bowel movements.    9. Mixed hyperlipidemia.  She is advised to continue watching her saturated fat intake to manage her cholesterol levels.    10. Health maintenance.  She is advised to wear a mask when going out for groceries to prevent COVID-19 or flu infection. She is also reminded to schedule a dental cleaning for her remaining teeth.    Patient Instructions     Health Maintenance Due   Topic Date Due    Pneumococcal Vaccine 65+ (1 of 2 - PCV) Never done    ZOSTER VACCINE (1 of 2) Never done    RSV Vaccine - Adults (1 - 1-dose 75+ series) Never done    INFLUENZA VACCINE  07/01/2024    DIABETIC EYE EXAM  08/14/2024    COVID-19 Vaccine (1 - 2024-25 season) Never done    ANNUAL WELLNESS VISIT  02/13/2025    HEMOGLOBIN A1C  02/26/2025    You are due for Shingrix vaccination series ( the newest shingles vaccine).  It is a two shot series spaced 2-6 months apart. Please get this vaccine series started at your earliest convenience at your local pharmacy to help avoid shingles outbreak. It is more effective  than the old Zostavax vaccine and is recommended even if you have had the Zostavax vaccine in the past.  Once the Shingrix series is completed, it does not need to be repeated.   For more information, please look at the website below:  https://www.cdc.gov/vaccines/vpd/shingles/public/shingrix/index.html    Check blood pressure cuff for accuracy and send 10 blood pressures over 2 weeks.  Watch sodium, alcohol and weight.  12 hour fast for labs.         Patient or patient representative verbalized consent for the use of Ambient Listening during the visit with  Yumiko Sahh MD for chart documentation. 12/10/2024  17:17 EST

## 2024-12-18 ENCOUNTER — TELEPHONE (OUTPATIENT)
Dept: FAMILY MEDICINE CLINIC | Facility: CLINIC | Age: 74
End: 2024-12-18
Payer: MEDICARE

## 2024-12-19 NOTE — TELEPHONE ENCOUNTER
Refaxed addended office visit for CGM  
[employer] is needing an addendum to this patient's last OV on 12/10/2024 stating that she is still using her Freestyle Nathaniel and that it is beneficial to her to keep using. They need this to be able to send her supplies.  
35y/o female scheduled for dilation vacuum curettage on 8/14/2018,  Pt states, "LMP 6/15/2018, unwanted pregnancy."

## 2024-12-27 ENCOUNTER — TELEPHONE (OUTPATIENT)
Dept: FAMILY MEDICINE CLINIC | Facility: CLINIC | Age: 74
End: 2024-12-27
Payer: MEDICARE

## 2024-12-27 NOTE — TELEPHONE ENCOUNTER
Caller: [employer]    Relationship:     Best call back number:     [phone]   OPTION 2    What was the call regarding:   ON THE 12/10/24 DOCTORS NOTES.  DR. INGRAM NEEDS TO SIGN THEM AND REFAX TO: [phone]

## 2025-02-14 ENCOUNTER — TELEPHONE (OUTPATIENT)
Dept: FAMILY MEDICINE CLINIC | Facility: CLINIC | Age: 75
End: 2025-02-14
Payer: MEDICARE

## 2025-02-14 DIAGNOSIS — R19.5 POSITIVE COLORECTAL CANCER SCREENING USING COLOGUARD TEST: Primary | ICD-10-CM

## 2025-02-14 NOTE — TELEPHONE ENCOUNTER
Detailed VM left with cologuard results. Instructed patient to call with any questions or concerns.    Jeanine Thacker MA  02/14/25

## 2025-02-14 NOTE — TELEPHONE ENCOUNTER
"HUB TO RELAY    \"Cologuard was positive.  If you agree I will put in an order for an colonoscopy with our colorectal surgeon to be done as soon as possible.  Please let me know.\"  "

## 2025-02-17 ENCOUNTER — TELEPHONE (OUTPATIENT)
Dept: FAMILY MEDICINE CLINIC | Facility: CLINIC | Age: 75
End: 2025-02-17
Payer: MEDICARE

## 2025-02-17 NOTE — TELEPHONE ENCOUNTER
"HUB TO RELAY: I lvm for patient to call back for this message.    \"Blood in the stool could be due to the blood thinner but could also be due to cancer. Unless we need to look and make sure there is no cancer there we would not be able to say. Concern is that the doctor that has you on the blood thinner would need to get permission in order for the blood thinner to be stopped for the colonoscopy. Please check with that doctor and see what they say \"  "

## 2025-02-17 NOTE — TELEPHONE ENCOUNTER
Blood in the stool could be due to the blood thinner but could also be due to cancer.  Unless we need to look and make sure there is no cancer there we would not be able to say.  Concern is that the doctor that has you on the blood thinner would need to get permission in order for the blood thinner to be stopped for the colonoscopy.  Please check with that doctor and see what they say

## 2025-02-19 NOTE — TELEPHONE ENCOUNTER
Detailed VM left with Provider's comments. Instructed patient to call with any questions or concerns.    Jeanine Thacker MA  02/19/25

## 2025-02-20 ENCOUNTER — TELEPHONE (OUTPATIENT)
Dept: FAMILY MEDICINE CLINIC | Facility: CLINIC | Age: 75
End: 2025-02-20

## 2025-02-20 NOTE — TELEPHONE ENCOUNTER
Please call patient and advise if there is about a 30% false positive result of the test.  When a positive test comes back we always recommend a colonoscopy so that we can look for actual cancerous or precancerous polyps and removed them and look at them under the microscope.  If she has any other questions or concerns she is welcome to set up an appointment and we would be glad to discuss with her.  If she would consider having the colonoscopy we can put the referral into Dr. Walker and I will sign.

## 2025-02-20 NOTE — TELEPHONE ENCOUNTER
"  Caller: Lula Burnett \"[facility]\"    Relationship: Self    Best call back number: [phone]      Who are you requesting to speak with (clinical staff, provider,  specific staff member): CLINICAL STAFF         What was the call regarding: THE POSITIVE RESULTS FOR THE COLOGUARD, WOULD LIKE MORE INFORMATION     Is it okay if the provider responds through [facility]: NO, CANNOT GET IN TO IT     "

## 2025-02-26 ENCOUNTER — TELEPHONE (OUTPATIENT)
Dept: FAMILY MEDICINE CLINIC | Facility: CLINIC | Age: 75
End: 2025-02-26

## 2025-02-26 NOTE — TELEPHONE ENCOUNTER
Transferred from the HUB, call from [employer]. Req notes from PT last office visit, confirmed fax number [phone]. They asked for a page count but I could not locate it. They asked if they could call back and I confirmed they could, advised that we close at 4:30pm and open at 8am.

## 2025-03-13 PROBLEM — N17.9 AKI (ACUTE KIDNEY INJURY): Status: RESOLVED | Noted: 2024-08-25 | Resolved: 2025-03-13

## 2025-03-13 NOTE — PATIENT INSTRUCTIONS
Health Maintenance Due   Topic Date Due    Pneumococcal Vaccine 50+ (1 of 2 - PCV) Never done    ZOSTER VACCINE (1 of 2) Never done    RSV Vaccine - Adults (1 - 1-dose 75+ series) Never done    INFLUENZA VACCINE  07/01/2024    DIABETIC EYE EXAM  08/14/2024    COVID-19 Vaccine (1 - 2024-25 season) Never done    ANNUAL WELLNESS VISIT  02/13/2025    BMI FOLLOWUP  02/13/2025    HEMOGLOBIN A1C  02/26/2025    URINE MICROALBUMIN-CREATININE RATIO (uACR)  05/14/2025    Check blood pressure cuff for accuracy and send 10 blood pressures over 2 weeks.  Watch sodium, alcohol and weight     12 hour fast for labs      Please call with daily inhaler  Let Dr. Gold know loop recorder is painful

## 2025-03-13 NOTE — PROGRESS NOTES
Subjective   The ABCs of the Annual Wellness Visit  Medicare Wellness Visit      Lula Burnett is a 74 y.o. patient who presents for a Medicare Wellness Visit.    The following portions of the patient's history were reviewed and   updated as appropriate: allergies, current medications, past family history, past medical history, past social history, past surgical history, and problem list.    Compared to one year ago, the patient's physical   health is better.  Compared to one year ago, the patient's mental   health is better.    Recent Hospitalizations:  This patient has had a [facility] admission record on file within the last 365 days.  Current Medical Providers:  Patient Care Team:  Yumiko Shah MD as PCP - General (Family Medicine)  Yaritza Pereira MD as Consulting Physician (Interventional Cardiology)    Outpatient Medications Prior to Visit   Medication Sig Dispense Refill    cetirizine (zyrTEC) 10 MG tablet TAKE 1 TABLET DAILY 90 tablet 3    cyclobenzaprine (FLEXERIL) 10 MG tablet Take 1 tablet by mouth 3 (Three) Times a Day As Needed for Muscle Spasms. 90 tablet 1    Eliquis 5 MG tablet tablet TAKE 1 TABLET EVERY 12 HOURS (FOR ATRIAL FIBRILLATION) 180 tablet 3    Insulin Glargine (Lantus SoloStar) 100 UNIT/ML injection pen Inject 30 Units under the skin into the appropriate area as directed Every Night. (Patient taking differently: Inject 30 Units under the skin into the appropriate area as directed 2 (Two) Times a Day As Needed (Watching sugar using as needed).) 30 mL 0    Insulin Lispro, 1 Unit Dial, (HumaLOG KwikPen) 100 UNIT/ML solution pen-injector Inject 10 Units under the skin into the appropriate area as directed Daily With Breakfast, Lunch & Dinner. (Patient taking differently: Inject 10 Units under the skin into the appropriate area as directed 3 (Three) Times a Day As Needed.) 15 mL 3    Insulin Pen Needle (B-D UF III MINI PEN NEEDLES) 31G X 5 MM misc 1 Piece Daily. 100 each  3    Insulin Pen Needle (Pen Needles) 32G X 4 MM misc Use 1 each 4 (Four) Times a Day Before Meals & at Bedtime. 600 each 2    levothyroxine (SYNTHROID, LEVOTHROID) 50 MCG tablet TAKE 1 TABLET DAILY 90 tablet 3    lisinopril (PRINIVIL,ZESTRIL) 2.5 MG tablet TAKE 1 TABLET DAILY 90 tablet 3    meclizine (ANTIVERT) 25 MG tablet TAKE 1 TABLET THREE TIMES A DAY AS NEEDED FOR DIZZINESS 90 tablet 0    montelukast (SINGULAIR) 10 MG tablet TAKE 1 TABLET EVERY NIGHT 90 tablet 3    nitroglycerin (NITROSTAT) 0.4 MG SL tablet 1 under the tongue as needed for angina, may repeat q5mins for up three doses 25 tablet 1    ondansetron ODT (ZOFRAN-ODT) 8 MG disintegrating tablet PLACE 1 TABLET ON THE TONGUE EVERY 8 HOURS AS NEEDED FOR NAUSEA OR VOMITING 30 tablet 0    pantoprazole (PROTONIX) 40 MG EC tablet TAKE 1 TABLET DAILY 90 tablet 3    PARoxetine (PAXIL) 20 MG tablet TAKE 1 TABLET DAILY 90 tablet 3    potassium chloride (KLOR-CON M20) 20 MEQ CR tablet TAKE 1 TABLET DAILY 21 tablet 16    ProAir  (90 Base) MCG/ACT inhaler Inhale 2 puffs Every 4 (Four) Hours As Needed for Wheezing or Shortness of Air. 18 g 3    rosuvastatin (CRESTOR) 20 MG tablet Take 1 tablet by mouth Daily. 90 tablet 3    Synjardy XR  MG tablet sustained-release 24 hour Take 1 tablet by mouth Daily. 90 tablet 0    topiramate (TOPAMAX) 25 MG tablet TAKE 1 TABLET TWICE A  tablet 3     No facility-administered medications prior to visit.     No opioid medication identified on active medication list. I have reviewed chart for other potential  high risk medication/s and harmful drug interactions in the elderly.      Aspirin is not on active medication list.  Aspirin use is not indicated based on review of current medical condition/s. Risk of harm outweighs potential benefits.  .    Patient Active Problem List   Diagnosis    Type 2 diabetes mellitus with diabetic neuropathy, with long-term current use of insulin    Diabetic neuropathy    Primary  "hypertension    Hypothyroidism    Mitral stenosis    Mixed hyperlipidemia    Anxiety    Depression    Psoriasis    Memory impairment    Paroxysmal atrial fibrillation    Left pontine stroke    Asthma    Transaminitis    Class 1 obesity due to excess calories with serious comorbidity and body mass index (BMI) of 31.0 to 31.9 in adult    Persistent proteinuria    Stage 3b chronic kidney disease    Instability of right knee joint    Tremor    Eyelid lag     Advance Care Planning Advance Directive is on file.  ACP discussion was held with the patient during this visit. Patient has an advance directive in EMR which is still valid.             Objective   Vitals:    25 1103 25 1106   BP: 145/81 146/81   BP Location: Left arm Right arm   Patient Position: Sitting Sitting   Cuff Size: Adult Adult   Pulse: 70    Temp: 97.1 °F (36.2 °C)    TempSrc: Infrared    SpO2: 97%    Weight: 77.2 kg (170 lb 1.6 oz)    Height: 157.5 cm (62.01\")    PainSc: 0-No pain        Estimated body mass index is 31.1 kg/m² as calculated from the following:    Height as of this encounter: 157.5 cm (62.01\").    Weight as of this encounter: 77.2 kg (170 lb 1.6 oz).    BMI is >= 25 and <30. (Overweight) The following options were offered after discussion;: exercise counseling/recommendations and nutrition counseling/recommendations           Does the patient have evidence of cognitive impairment? No                                                                                                Health  Risk Assessment    Smoking Status:  Social History     Tobacco Use   Smoking Status Former    Current packs/day: 0.00    Average packs/day: 1 pack/day for 33.1 years (33.1 ttl pk-yrs)    Types: Cigarettes    Start date: 1978    Quit date: 2011    Years since quittin.1   Smokeless Tobacco Never   Tobacco Comments    Quit around      Alcohol Consumption:  Social History     Substance and Sexual Activity   Alcohol Use Not Currently "       Fall Risk Screen  STEADI Fall Risk Assessment was completed, and patient is at LOW risk for falls.Assessment completed on:3/14/2025    Depression Screening   Little interest or pleasure in doing things? Not at all   Feeling down, depressed, or hopeless? Not at all   PHQ-2 Total Score 0      Health Habits and Functional and Cognitive Screening:      3/14/2025    11:01 AM   Functional & Cognitive Status   Do you have difficulty preparing food and eating? No   Do you have difficulty bathing yourself, getting dressed or grooming yourself? No   Do you have difficulty using the toilet? No   Do you have difficulty moving around from place to place? No   Do you have trouble with steps or getting out of a bed or a chair? No   Current Diet Well Balanced Diet   Dental Exam Up to date   Eye Exam Up to date   Exercise (times per week) 5 times per week   Current Exercises Include Other   Do you need help using the phone?  No   Are you deaf or do you have serious difficulty hearing?  No   Do you need help to go to places out of walking distance? No   Do you need help shopping? No   Do you need help preparing meals?  No   Do you need help with housework?  No   Do you need help with laundry? No   Do you need help taking your medications? No   Do you need help managing money? No   Do you ever drive or ride in a car without wearing a seat belt? No   Have you felt unusual stress, anger or loneliness in the last month? No   Who do you live with? Alone   If you need help, do you have trouble finding someone available to you? No   Have you been bothered in the last four weeks by sexual problems? No   Do you have difficulty concentrating, remembering or making decisions? No           Age-appropriate Screening Schedule:  Refer to the list below for future screening recommendations based on patient's age, sex and/or medical conditions. Orders for these recommended tests are listed in the plan section. The patient has been provided with  a written plan.    Health Maintenance List  Health Maintenance   Topic Date Due    ZOSTER VACCINE (1 of 2) Never done    RSV Vaccine - Adults (1 - 1-dose 75+ series) Never done    DIABETIC EYE EXAM  08/14/2024    BMI FOLLOWUP  02/13/2025    HEMOGLOBIN A1C  02/26/2025    URINE MICROALBUMIN-CREATININE RATIO (uACR)  05/14/2025    COVID-19 Vaccine (1 - 2024-25 season) 03/16/2025 (Originally 9/1/2024)    DXA SCAN  06/15/2025    LIPID PANEL  09/10/2025    ANNUAL WELLNESS VISIT  03/14/2026    TDAP/TD VACCINES (2 - Td or Tdap) 03/17/2026    HEPATITIS C SCREENING  Completed    INFLUENZA VACCINE  Completed    Pneumococcal Vaccine 50+  Completed    MAMMOGRAM  Discontinued    LUNG CANCER SCREENING  Discontinued    COLORECTAL CANCER SCREENING  Discontinued                                                                                                                                                CMS Preventative Services Quick Reference  Risk Factors Identified During Encounter  Fall Risk-High or Moderate: Discussed Fall Prevention in the home    The above risks/problems have been discussed with the patient.  Pertinent information has been shared with the patient in the After Visit Summary.  An After Visit Summary and PPPS were made available to the patient.    Follow Up:   Next Medicare Wellness visit to be scheduled in 1 year.         Additional E&M Note during same encounter follows:  Patient has additional, significant, and separately identifiable condition(s)/problem(s) that require work above and beyond the Medicare Wellness Visit     Chief Complaint  Medicare Wellness-subsequent (Is fasting), Diabetes, Hypothyroidism, and Hypertension    Subjective    HPI  Maylin is also being seen today for an annual adult preventative physical exam.  and Maylin is also being seen today for additional medical problem/s.       The patient is a 74-year-old female who presents today for her annual and age-specific physical for Medicare. She  has a history of type 2 diabetes, hypertension, hypothyroidism, mitral valve stenosis, hyperlipidemia, major depression, paroxysmal atrial fibrillation, left pontine stroke, class I obesity due to excess calories with serious comorbidities (BMI of 31), tremor, and vitamin D deficiency.    She reports no changes in hearing or vision. She has not had an eye or dental exam in the past year but is due for both. She has a history of retinal surgery on her left eye. She performs monthly self-breast exams in both lying and standing positions. She reports no hemoptysis, sputum production, or wheezing. She has been experiencing chest pressure and palpitations since the birth of her son 57 years ago, but these symptoms have never resulted in syncope. She reports no dysuria, hematuria, vaginal discharge, fever, chills, night sweats, or weight loss. She has noticed persistent melena since starting Eliquis. She is fasting today and plans to return another day for blood work. She has a family history of bowel issues but does not experience constipation. She always wears a seatbelt and uses handrails at home. She believes her physical and mental health have improved compared to the previous year. She has not been hospitalized in the past year. She does not take aspirin due to a tendency to bruise. She has an up-to-date advanced directive. She has lost weight, reaching 159 pounds, but noticed a slight increase in weight over the past week. She is not interested in additional information on alcohol misuse, chronic pain, depression, drug use, fall risk, glaucoma, hearing problems, immunizations, loneliness, inactivity, polypharmacy, high-risk sexual behavior, tobacco use, or urinary incontinence. She has a home blood pressure monitor but it recently broke. She is scheduled to see Dr. Daniel on 04/23/2025 for her mitral valve. She has noticed increased dry skin and hair loss, which she attributes to the season. Her depression is  "well-managed with Paxil 20 mg, which she has been taking for several years. She has not experienced any stroke symptoms since starting Eliquis. She has not had any issues with her tremor. She used to take vitamin D supplements but stopped due to concerns about overdose. She has had 9 strokes. She has been experiencing leg pain for about a month. She had a bad reaction to the influenza vaccine last year and felt horrible. She has been receiving pneumonia vaccines every October. She declined the COVID-19 vaccine.    Her blood glucose levels have been well-controlled, with a reading of 165 this morning.    She has asthma and uses ProAir as a rescue inhaler, Singulair, Zyrtec, and an unidentified long-term inhaler.    Supplemental Information  She has psoriasis and used topical psoriasis medicine on her dog bite, which helped it heal.    FAMILY HISTORY  Her mother had trouble with her bowels and was constipated.    ALLERGIES  She is allergic to ASPIRIN.    MEDICATIONS  Current: Paxil, Singulair, Zyrtec, ProAir, Eliquis    IMMUNIZATIONS  She had a bad reaction to the influenza vaccine last year. She has been receiving pneumonia vaccines every October. She declined the COVID-19 vaccine.          Objective   Vital Signs:  /81 (BP Location: Right arm, Patient Position: Sitting, Cuff Size: Adult)   Pulse 70   Temp 97.1 °F (36.2 °C) (Infrared)   Ht 157.5 cm (62.01\")   Wt 77.2 kg (170 lb 1.6 oz)   SpO2 97%   BMI 31.10 kg/m²   Physical Exam  Vitals reviewed.   Constitutional:       General: She is not in acute distress.     Appearance: She is well-developed. She is obese. She is not ill-appearing or toxic-appearing.   HENT:      Head: Normocephalic and atraumatic.      Right Ear: Tympanic membrane, ear canal and external ear normal.      Left Ear: Tympanic membrane, ear canal and external ear normal.      Nose: Nose normal.      Mouth/Throat:      Mouth: Mucous membranes are moist.      Pharynx: No posterior " oropharyngeal erythema.   Eyes:      Extraocular Movements: Extraocular movements intact.      Conjunctiva/sclera: Conjunctivae normal.      Pupils: Pupils are equal, round, and reactive to light.   Neck:      Vascular: No carotid bruit.   Cardiovascular:      Rate and Rhythm: Normal rate and regular rhythm.      Pulses:           Dorsalis pedis pulses are 1+ on the right side and 1+ on the left side.        Posterior tibial pulses are 1+ on the right side and 1+ on the left side.      Heart sounds: Normal heart sounds.   Pulmonary:      Effort: Pulmonary effort is normal.      Breath sounds: Normal breath sounds.   Abdominal:      General: Bowel sounds are normal. There is no distension.      Palpations: Abdomen is soft. There is no mass.      Tenderness: There is no abdominal tenderness. There is no right CVA tenderness or left CVA tenderness.   Musculoskeletal:         General: Normal range of motion.      Cervical back: Neck supple. No tenderness.      Right lower leg: No edema.      Left lower leg: No edema.   Feet:      Right foot:      Protective Sensation: 10 sites tested.  10 sites sensed.      Skin integrity: Skin integrity normal. Callus present.      Toenail Condition: Right toenails are normal.      Left foot:      Protective Sensation: 10 sites tested.  10 sites sensed.      Skin integrity: Skin integrity normal. Callus present.      Toenail Condition: Left toenails are normal.      Comments: Diabetic Foot Exam Performed and Monofilament Test Performed    Lymphadenopathy:      Cervical: No cervical adenopathy.   Skin:     General: Skin is warm.   Neurological:      General: No focal deficit present.      Mental Status: She is alert and oriented to person, place, and time.   Psychiatric:         Mood and Affect: Mood normal.         Behavior: Behavior normal.           There is a little left lid lag in the eye. Oral exam was performed.  No tenderness on palpation of the neck. Carotids are normal.  Lungs  sound normal.  Heart rhythm is regular and the rate is 68.    Vital Signs  BMI is 31.            Results  Laboratory Studies  Blood sugar was 165.           Assessment and Plan        1. Annual Medicare physical examination.  Her blood pressure readings were slightly elevated during this visit. She exhibits no signs of cognitive dysfunction. Her cardiac rhythm is regular at present. She has been advised to maintain a healthy diet, continue her walking regimen, and apply sunscreen regularly. A prescription for a blood pressure monitor will be sent to [facility]. She is instructed to record 10 blood pressure readings over the next 2 weeks. A comprehensive panel of tests will be ordered, including CBC, kidney function, liver function, hemoglobin A1c, cholesterol, magnesium, thyroid, B12, and vitamin D. A urine sample will also be collected today. She will receive the influenza and pneumonia vaccines today. She is encouraged to consider the RSV vaccine. She will provide a stool sample for occult blood testing. If the test is positive, a discussion with her cardiologist regarding the continuation of Eliquis will be necessary.    2. Type 2 Diabetes Mellitus.  Her blood sugar levels have been generally good, with a recent reading of 165. She is advised to continue monitoring her blood sugar levels and maintain a balanced diet. Hemoglobin A1c will be checked.    3. Hypertension.  Her blood pressure was slightly elevated today. She is advised to monitor her blood pressure at home. A prescription for a new blood pressure cuff will be sent to [facility]. She should record 10 blood pressure readings over the next 2 weeks and report them.    4. Hypothyroidism.  A thyroid function test will be ordered to ensure her levels are stable.    5. Mitral Valve Stenosis.  She has an upcoming appointment with Dr. Daniel on 04/23/2025 to evaluate her mitral valve condition.    6. Hyperlipidemia.  She is  advised to continue avoiding fried foods and monitoring her saturated fat intake. Cholesterol levels will be checked.    7. Major Depression.  She reports that her depression is well-managed with Paxil 20 mg, which she has been taking for years.    8. Paroxysmal Atrial Fibrillation.  She reports occasional heart flutters but no recent episodes. She is advised to continue her current medication regimen, including Eliquis.    9. Left Pontine Stroke.  She has a history of multiple strokes but reports no new symptoms since starting Eliquis.    10. Class I Obesity with Serious Comorbidities.  Her BMI is 31. She is advised to watch her portion sizes and keep up her walking regimen to manage her weight.    11. Tremor.  She reports no recent issues with tremors.    12. Vitamin D Deficiency.  A vitamin D level test will be ordered to monitor her status.    13. Asthma.  She is advised to call with the name of her daily inhaler.    Follow-up  The patient will follow up in 3 months.    PROCEDURE  The patient has a history of retinal surgery on her left eye.         Follow Up   Return in about 3 months (around 6/14/2025).  Patient was given instructions and counseling regarding her condition or for health maintenance advice. Please see specific information pulled into the AVS if appropriate.  Patient or patient representative verbalized consent for the use of Ambient Listening during the visit with  Yumiko Shah MD for chart documentation. 3/14/2025  20:54 EDT

## 2025-03-14 ENCOUNTER — OFFICE VISIT (OUTPATIENT)
Dept: FAMILY MEDICINE CLINIC | Facility: CLINIC | Age: 75
End: 2025-03-14
Payer: MEDICARE

## 2025-03-14 VITALS
DIASTOLIC BLOOD PRESSURE: 81 MMHG | HEART RATE: 70 BPM | OXYGEN SATURATION: 97 % | HEIGHT: 62 IN | BODY MASS INDEX: 31.3 KG/M2 | TEMPERATURE: 97.1 F | WEIGHT: 170.1 LBS | SYSTOLIC BLOOD PRESSURE: 146 MMHG

## 2025-03-14 DIAGNOSIS — E78.2 MIXED HYPERLIPIDEMIA: Chronic | ICD-10-CM

## 2025-03-14 DIAGNOSIS — Z79.4 TYPE 2 DIABETES MELLITUS WITH DIABETIC NEUROPATHY, WITH LONG-TERM CURRENT USE OF INSULIN: Chronic | ICD-10-CM

## 2025-03-14 DIAGNOSIS — Z00.01 ENCOUNTER FOR ANNUAL GENERAL MEDICAL EXAMINATION WITH ABNORMAL FINDINGS IN ADULT: Primary | ICD-10-CM

## 2025-03-14 DIAGNOSIS — H02.539 EYELID LAG: ICD-10-CM

## 2025-03-14 DIAGNOSIS — E03.9 HYPOTHYROIDISM, UNSPECIFIED TYPE: Chronic | ICD-10-CM

## 2025-03-14 DIAGNOSIS — R25.1 TREMOR: ICD-10-CM

## 2025-03-14 DIAGNOSIS — E55.9 VITAMIN D DEFICIENCY: ICD-10-CM

## 2025-03-14 DIAGNOSIS — E66.811 CLASS 1 OBESITY DUE TO EXCESS CALORIES WITH SERIOUS COMORBIDITY AND BODY MASS INDEX (BMI) OF 31.0 TO 31.9 IN ADULT: ICD-10-CM

## 2025-03-14 DIAGNOSIS — I63.9 LEFT PONTINE STROKE: ICD-10-CM

## 2025-03-14 DIAGNOSIS — I48.0 PAROXYSMAL ATRIAL FIBRILLATION: Chronic | ICD-10-CM

## 2025-03-14 DIAGNOSIS — I05.0 MITRAL VALVE STENOSIS, UNSPECIFIED ETIOLOGY: Chronic | ICD-10-CM

## 2025-03-14 DIAGNOSIS — J45.20 MILD INTERMITTENT ASTHMA, UNSPECIFIED WHETHER COMPLICATED: ICD-10-CM

## 2025-03-14 DIAGNOSIS — E66.09 CLASS 1 OBESITY DUE TO EXCESS CALORIES WITH SERIOUS COMORBIDITY AND BODY MASS INDEX (BMI) OF 31.0 TO 31.9 IN ADULT: ICD-10-CM

## 2025-03-14 DIAGNOSIS — E11.40 TYPE 2 DIABETES MELLITUS WITH DIABETIC NEUROPATHY, WITH LONG-TERM CURRENT USE OF INSULIN: Chronic | ICD-10-CM

## 2025-03-14 DIAGNOSIS — F32.5 MAJOR DEPRESSIVE DISORDER IN FULL REMISSION, UNSPECIFIED WHETHER RECURRENT: Chronic | ICD-10-CM

## 2025-03-14 DIAGNOSIS — I10 PRIMARY HYPERTENSION: Chronic | ICD-10-CM

## 2025-03-14 RX ORDER — NEBULIZER AND COMPRESSOR
1 EACH MISCELLANEOUS DAILY
Qty: 1 EACH | Refills: 0 | Status: SHIPPED | OUTPATIENT
Start: 2025-03-14

## 2025-03-20 ENCOUNTER — LAB (OUTPATIENT)
Dept: FAMILY MEDICINE CLINIC | Facility: CLINIC | Age: 75
End: 2025-03-20
Payer: MEDICARE

## 2025-03-20 DIAGNOSIS — E78.2 MIXED HYPERLIPIDEMIA: Chronic | ICD-10-CM

## 2025-03-20 DIAGNOSIS — Z79.4 TYPE 2 DIABETES MELLITUS WITH DIABETIC NEUROPATHY, WITH LONG-TERM CURRENT USE OF INSULIN: Chronic | ICD-10-CM

## 2025-03-20 DIAGNOSIS — F32.5 MAJOR DEPRESSIVE DISORDER IN FULL REMISSION, UNSPECIFIED WHETHER RECURRENT: Chronic | ICD-10-CM

## 2025-03-20 DIAGNOSIS — E11.40 TYPE 2 DIABETES MELLITUS WITH DIABETIC NEUROPATHY, WITH LONG-TERM CURRENT USE OF INSULIN: Chronic | ICD-10-CM

## 2025-03-20 DIAGNOSIS — E55.9 VITAMIN D DEFICIENCY: ICD-10-CM

## 2025-03-20 DIAGNOSIS — I10 PRIMARY HYPERTENSION: ICD-10-CM

## 2025-03-20 DIAGNOSIS — E03.9 HYPOTHYROIDISM, UNSPECIFIED TYPE: Chronic | ICD-10-CM

## 2025-03-20 LAB
25(OH)D3 SERPL-MCNC: 34.6 NG/ML (ref 30–100)
ALBUMIN SERPL-MCNC: 4.5 G/DL (ref 3.5–5.2)
ALBUMIN/GLOB SERPL: 1.5 G/DL
ALP SERPL-CCNC: 112 U/L (ref 39–117)
ALT SERPL W P-5'-P-CCNC: 37 U/L (ref 1–33)
ANION GAP SERPL CALCULATED.3IONS-SCNC: 11.9 MMOL/L (ref 5–15)
AST SERPL-CCNC: 31 U/L (ref 1–32)
BASOPHILS # BLD MANUAL: 0.12 10*3/MM3 (ref 0–0.2)
BASOPHILS NFR BLD MANUAL: 1.1 % (ref 0–1.5)
BILIRUB SERPL-MCNC: 0.2 MG/DL (ref 0–1.2)
BUN SERPL-MCNC: 42 MG/DL (ref 8–23)
BUN/CREAT SERPL: 37.5 (ref 7–25)
CALCIUM SPEC-SCNC: 9.6 MG/DL (ref 8.6–10.5)
CHLORIDE SERPL-SCNC: 105 MMOL/L (ref 98–107)
CHOLEST SERPL-MCNC: 209 MG/DL (ref 0–200)
CO2 SERPL-SCNC: 23.1 MMOL/L (ref 22–29)
CREAT SERPL-MCNC: 1.12 MG/DL (ref 0.57–1)
DEPRECATED RDW RBC AUTO: 49.6 FL (ref 37–54)
EGFRCR SERPLBLD CKD-EPI 2021: 51.1 ML/MIN/1.73
EOSINOPHIL # BLD MANUAL: 0.12 10*3/MM3 (ref 0–0.4)
EOSINOPHIL NFR BLD MANUAL: 1.1 % (ref 0.3–6.2)
ERYTHROCYTE [DISTWIDTH] IN BLOOD BY AUTOMATED COUNT: 15.9 % (ref 12.3–15.4)
GLOBULIN UR ELPH-MCNC: 3 GM/DL
GLUCOSE SERPL-MCNC: 142 MG/DL (ref 65–99)
HBA1C MFR BLD: 8.5 % (ref 4.8–5.6)
HCT VFR BLD AUTO: 41.9 % (ref 34–46.6)
HDLC SERPL-MCNC: 66 MG/DL (ref 40–60)
HGB BLD-MCNC: 13.7 G/DL (ref 12–15.9)
LDLC SERPL CALC-MCNC: 89 MG/DL (ref 0–100)
LDLC/HDLC SERPL: 1.15 {RATIO}
LYMPHOCYTES # BLD MANUAL: 7.23 10*3/MM3 (ref 0.7–3.1)
LYMPHOCYTES NFR BLD MANUAL: 1.1 % (ref 5–12)
MAGNESIUM SERPL-MCNC: 2.2 MG/DL (ref 1.6–2.4)
MCH RBC QN AUTO: 27.8 PG (ref 26.6–33)
MCHC RBC AUTO-ENTMCNC: 32.7 G/DL (ref 31.5–35.7)
MCV RBC AUTO: 85.2 FL (ref 79–97)
MONOCYTES # BLD: 0.12 10*3/MM3 (ref 0.1–0.9)
NEUTROPHILS # BLD AUTO: 3.49 10*3/MM3 (ref 1.7–7)
NEUTROPHILS NFR BLD MANUAL: 31.5 % (ref 42.7–76)
NRBC BLD AUTO-RTO: 0 /100 WBC (ref 0–0.2)
PLAT MORPH BLD: NORMAL
PLATELET # BLD AUTO: 347 10*3/MM3 (ref 140–450)
PMV BLD AUTO: 10.2 FL (ref 6–12)
POTASSIUM SERPL-SCNC: 3.6 MMOL/L (ref 3.5–5.2)
PROT SERPL-MCNC: 7.5 G/DL (ref 6–8.5)
RBC # BLD AUTO: 4.92 10*6/MM3 (ref 3.77–5.28)
RBC MORPH BLD: NORMAL
SMUDGE CELLS BLD QL SMEAR: ABNORMAL
SODIUM SERPL-SCNC: 140 MMOL/L (ref 136–145)
TRIGL SERPL-MCNC: 334 MG/DL (ref 0–150)
TSH SERPL DL<=0.05 MIU/L-ACNC: 2.31 UIU/ML (ref 0.27–4.2)
VARIANT LYMPHS NFR BLD MANUAL: 65.2 % (ref 19.6–45.3)
VIT B12 BLD-MCNC: 1201 PG/ML (ref 211–946)
VLDLC SERPL-MCNC: 54 MG/DL (ref 5–40)
WBC NRBC COR # BLD AUTO: 11.09 10*3/MM3 (ref 3.4–10.8)

## 2025-03-20 PROCEDURE — 82570 ASSAY OF URINE CREATININE: CPT | Performed by: PREVENTIVE MEDICINE

## 2025-03-20 PROCEDURE — 82043 UR ALBUMIN QUANTITATIVE: CPT | Performed by: PREVENTIVE MEDICINE

## 2025-03-20 PROCEDURE — 84443 ASSAY THYROID STIM HORMONE: CPT | Performed by: PREVENTIVE MEDICINE

## 2025-03-20 PROCEDURE — 82607 VITAMIN B-12: CPT | Performed by: PREVENTIVE MEDICINE

## 2025-03-20 PROCEDURE — 83036 HEMOGLOBIN GLYCOSYLATED A1C: CPT | Performed by: PREVENTIVE MEDICINE

## 2025-03-20 PROCEDURE — 36415 COLL VENOUS BLD VENIPUNCTURE: CPT

## 2025-03-20 PROCEDURE — 83735 ASSAY OF MAGNESIUM: CPT | Performed by: PREVENTIVE MEDICINE

## 2025-03-20 PROCEDURE — 80061 LIPID PANEL: CPT | Performed by: PREVENTIVE MEDICINE

## 2025-03-20 PROCEDURE — 85025 COMPLETE CBC W/AUTO DIFF WBC: CPT | Performed by: PREVENTIVE MEDICINE

## 2025-03-20 PROCEDURE — 80053 COMPREHEN METABOLIC PANEL: CPT | Performed by: PREVENTIVE MEDICINE

## 2025-03-20 PROCEDURE — 85007 BL SMEAR W/DIFF WBC COUNT: CPT | Performed by: PREVENTIVE MEDICINE

## 2025-03-20 PROCEDURE — 82306 VITAMIN D 25 HYDROXY: CPT | Performed by: PREVENTIVE MEDICINE

## 2025-03-21 LAB
ALBUMIN UR-MCNC: 118.2 MG/DL
CREAT UR-MCNC: 162.4 MG/DL
MICROALBUMIN/CREAT UR: 727.8 MG/G (ref 0–29)

## 2025-03-24 DIAGNOSIS — M62.838 MUSCLE SPASM: ICD-10-CM

## 2025-03-24 RX ORDER — CYCLOBENZAPRINE HCL 10 MG
10 TABLET ORAL 3 TIMES DAILY PRN
Qty: 90 TABLET | Status: CANCELLED | OUTPATIENT
Start: 2025-03-24

## 2025-03-24 NOTE — TELEPHONE ENCOUNTER
Rx Refill Note  Requested Prescriptions     Pending Prescriptions Disp Refills    cyclobenzaprine (FLEXERIL) 10 MG tablet 90 tablet      Sig: Take 1 tablet by mouth 3 (Three) Times a Day As Needed for Muscle Spasms.      Last office visit with prescribing clinician: 3/14/2025   Last telemedicine visit with prescribing clinician: Visit date not found   Next office visit with prescribing clinician: 6/16/2025                         Would you like a call back once the refill request has been completed: [] Yes [] No    If the office needs to give you a call back, can they leave a voicemail: [] Yes [] No    Jeanine Thacker MA  03/24/25, 13:14 EDT

## 2025-03-24 NOTE — TELEPHONE ENCOUNTER
Rx Refill Note  Requested Prescriptions     Pending Prescriptions Disp Refills   • cyclobenzaprine (FLEXERIL) 10 MG tablet 90 tablet      Sig: Take 1 tablet by mouth 3 (Three) Times a Day As Needed for Muscle Spasms.      Last office visit with prescribing clinician: 3/14/2025   Last telemedicine visit with prescribing clinician: Visit date not found   Next office visit with prescribing clinician: 6/16/2025       Would you like a call back once the refill request has been completed: [] Yes [] No    If the office needs to give you a call back, can they leave a voicemail: [] Yes [] No    Jeanine Thacker MA  03/24/25, 13:15 EDT

## 2025-03-24 NOTE — TELEPHONE ENCOUNTER
We do not prescribe Flexeril on a routine basis.  We can give you a few of those but you will need to have your condition evaluated and sent possibly to pain management or to a proper specialist.  What are you taking the Flexeril for?

## 2025-03-25 RX ORDER — CYCLOBENZAPRINE HCL 10 MG
10 TABLET ORAL 3 TIMES DAILY PRN
Qty: 90 TABLET | Refills: 1 | OUTPATIENT
Start: 2025-03-25

## 2025-03-25 NOTE — TELEPHONE ENCOUNTER
Rx Refill Note  Requested Prescriptions     Pending Prescriptions Disp Refills   • cyclobenzaprine (FLEXERIL) 10 MG tablet 90 tablet      Sig: Take 1 tablet by mouth 3 (Three) Times a Day As Needed for Muscle Spasms.      Last office visit with prescribing clinician: 3/14/2025   Last telemedicine visit with prescribing clinician: Visit date not found   Next office visit with prescribing clinician: 6/16/2025       Would you like a call back once the refill request has been completed: [] Yes [] No    If the office needs to give you a call back, can they leave a voicemail: [] Yes [] No    Jeanine Thacker MA  03/25/25, 11:33 EDT

## 2025-03-25 NOTE — TELEPHONE ENCOUNTER
"HUB TO RELAY    \"We do not prescribe Flexeril on a routine basis. We can give you a few of those but you will need to have your condition evaluated and sent possibly to pain management or to a proper specialist. What are you taking the Flexeril for?\"     Detailed VM left with Provider's comment. Instructed patient to call with any questions or concerns and to let us know why she takes the Flexeril.    "

## 2025-04-02 ENCOUNTER — TELEPHONE (OUTPATIENT)
Dept: FAMILY MEDICINE CLINIC | Facility: CLINIC | Age: 75
End: 2025-04-02
Payer: MEDICARE

## 2025-04-02 NOTE — TELEPHONE ENCOUNTER
"    Caller: Lula Burnett \"Maylin\"    Relationship to patient: Self    Best call back number:   Telephone Information:   Mobile [phone]         Patient is needing: PATIENT CALLING TO ADVISE HER BLOOD PRESSURE HAS BEEN 120/61 PRETTY MUCH ALL WEEK AND SHE FEELS GREAT TODAY. SHE HAS NOTICED AN UPTICK IN HER ENERGY THIS WEEK ALSO             "

## 2025-04-02 NOTE — TELEPHONE ENCOUNTER
Blood pressure looks great and we are glad the energy is returning call if you have any other questions or concerns

## 2025-04-25 RX ORDER — NITROGLYCERIN 0.4 MG/1
TABLET SUBLINGUAL
Qty: 25 TABLET | Refills: 1 | Status: SHIPPED | OUTPATIENT
Start: 2025-04-25

## 2025-06-03 ENCOUNTER — TELEPHONE (OUTPATIENT)
Dept: FAMILY MEDICINE CLINIC | Facility: CLINIC | Age: 75
End: 2025-06-03

## 2025-06-03 ENCOUNTER — TELEPHONE (OUTPATIENT)
Dept: FAMILY MEDICINE CLINIC | Facility: CLINIC | Age: 75
End: 2025-06-03
Payer: MEDICARE

## 2025-06-03 NOTE — TELEPHONE ENCOUNTER
Jardiance is usually given to help preserve kidney function-yours has definitely improved while taking Jardiance-I would be glad to refer you to Nephrology if you want to discuss.  Main side effect is that it can cause a yeast infection in your genitals-if that occurs stop med immediately and let us know

## 2025-06-03 NOTE — TELEPHONE ENCOUNTER
"  Caller: Lula Burnett \"Maylin\"    Relationship: Self    Best call back number:     What is the best time to reach you:     Who are you requesting to speak with (clinical staff, provider,  specific staff member):     Do you know the name of the person who called:     What was the call regarding: PATIENT IS CALLING IN TO INFORM DR INGRAM THAT SHE PICKED UP THE JARDIANCE MEDICATION ON 06/02/25 BUT HAS NOT STARTED TAKING IT YET. .  SHE SAYS SHE IS A LITTLE CONCERNED ABOUT THIS MEDICATION WITH HER KIDNEY DISEASE. SHE WANTS TO BE CALLED WITH INSTRUCTIONS ON WHAT SHE NEEDS TO DO.       "

## 2025-06-03 NOTE — TELEPHONE ENCOUNTER
"    Caller: Lula Burnett \"Maylin\"    Relationship to patient: Self    Best call back number: [phone]    Patient is needing: PATIENT IS CALLING TO LET DR. INGRAM KNOW THAT SHE JUST TOOK HER empagliflozin (Jardiance) 25 MG tablet tablet A FEW MINUTES AGO AND SHE HASN'T HAD ANY TROUBLES YET BUT ITS ONLY BEEN A FEW MINUTES. SHE IS CONCERNED BECAUSE THE BOTTLE SAYS NOT TO TAKE WITH HIGH BLOOD PRESSURE PILLS AND SHE IS TAKING TWO. SHE STATES HER KIDNEY DOCTOR RECOMMENDED JARDIANCE AS WELL. SHE IS VERY CONFUSED         "

## 2025-06-05 ENCOUNTER — TELEPHONE (OUTPATIENT)
Dept: FAMILY MEDICINE CLINIC | Facility: CLINIC | Age: 75
End: 2025-06-05
Payer: MEDICARE

## 2025-06-05 NOTE — TELEPHONE ENCOUNTER
"  Caller: Lula Burnett \"Maylin\"    Relationship: Self    Best call back number: [phone]     Which medication are you concerned about:     metFORMIN (GLUCOPHAGE) 500 MG tablet       Who prescribed you this medication: [facility]    When did you start taking this medication: NA    What are your concerns: MEDICATION CAUSES HER WEAK AND DIZZY.  SHE IS NOT TAKING THIS MEDICATION        "

## 2025-06-09 NOTE — TELEPHONE ENCOUNTER
"Per  verbal release authorization, detailed VM left with Provider's comments. Instructed patient to call with any questions or concerns.    \"We have appointment to see her on the 16th and we can discuss other medicines then if she sees that her blood sugars are going over 200 in the meantime she may want to call us back.\"    Jeanine Thacker MA  06/09/25     "

## 2025-06-11 ENCOUNTER — PREP FOR SURGERY (OUTPATIENT)
Dept: OTHER | Facility: HOSPITAL | Age: 75
End: 2025-06-11
Payer: MEDICARE

## 2025-06-11 DIAGNOSIS — Z12.11 ENCOUNTER FOR SCREENING COLONOSCOPY: Primary | ICD-10-CM

## 2025-06-12 PROBLEM — Z12.11 ENCOUNTER FOR SCREENING COLONOSCOPY: Status: ACTIVE | Noted: 2025-06-11

## 2025-06-12 RX ORDER — SODIUM CHLORIDE 9 MG/ML
30 INJECTION, SOLUTION INTRAVENOUS CONTINUOUS PRN
OUTPATIENT
Start: 2025-06-12 | End: 2025-06-13

## 2025-06-24 NOTE — TELEPHONE ENCOUNTER
Rx Refill Note  Requested Prescriptions     Pending Prescriptions Disp Refills    empagliflozin (Jardiance) 25 MG tablet tablet 90 tablet 3     Sig: Take 1 tablet by mouth Daily.      Last office visit with prescribing clinician: 3/14/2025   Last telemedicine visit with prescribing clinician: Visit date not found   Next office visit with prescribing clinician: Visit date not found                         Would you like a call back once the refill request has been completed: [] Yes [] No    If the office needs to give you a call back, can they leave a voicemail: [] Yes [] No    Jayne Garcia MA  06/24/25, 08:58 EDT

## 2025-07-31 DIAGNOSIS — J45.909 ASTHMA, UNSPECIFIED ASTHMA SEVERITY, UNSPECIFIED WHETHER COMPLICATED, UNSPECIFIED WHETHER PERSISTENT: ICD-10-CM

## 2025-07-31 RX ORDER — MONTELUKAST SODIUM 10 MG/1
10 TABLET ORAL NIGHTLY
Qty: 90 TABLET | Refills: 3 | Status: SHIPPED | OUTPATIENT
Start: 2025-07-31

## 2025-08-18 DIAGNOSIS — E78.2 MIXED HYPERLIPIDEMIA: ICD-10-CM

## 2025-08-18 DIAGNOSIS — F32.5 MAJOR DEPRESSIVE DISORDER IN FULL REMISSION, UNSPECIFIED WHETHER RECURRENT: ICD-10-CM

## 2025-08-18 RX ORDER — PAROXETINE 20 MG/1
20 TABLET, FILM COATED ORAL DAILY
Qty: 90 TABLET | Refills: 3 | Status: SHIPPED | OUTPATIENT
Start: 2025-08-18

## 2025-08-18 RX ORDER — CETIRIZINE HYDROCHLORIDE 10 MG/1
10 TABLET ORAL DAILY
Qty: 90 TABLET | Refills: 3 | Status: SHIPPED | OUTPATIENT
Start: 2025-08-18

## 2025-08-18 RX ORDER — ROSUVASTATIN CALCIUM 20 MG/1
20 TABLET, COATED ORAL DAILY
Qty: 90 TABLET | Refills: 3 | Status: SHIPPED | OUTPATIENT
Start: 2025-08-18

## (undated) DEVICE — PK CATH CARD 40

## (undated) DEVICE — CATH DIAG IMPULSE FR4 5F 100CM

## (undated) DEVICE — GLIDESHEATH BASIC HYDROPHILIC COATED INTRODUCER SHEATH: Brand: GLIDESHEATH

## (undated) DEVICE — KT MANIFLD CARDIAC

## (undated) DEVICE — BALN PRESS WEDGE 5F 110CM

## (undated) DEVICE — CATH DIAG IMPULSE PIG 5F 100CM

## (undated) DEVICE — CATH DIAG IMPULSE FL3.5 5F 100CM

## (undated) DEVICE — GW EMR FIX EXCHG J STD .035 3MM 260CM

## (undated) DEVICE — TR BAND RADIAL ARTERY COMPRESSION DEVICE: Brand: TR BAND